# Patient Record
Sex: FEMALE | Race: WHITE | NOT HISPANIC OR LATINO | Employment: PART TIME | URBAN - METROPOLITAN AREA
[De-identification: names, ages, dates, MRNs, and addresses within clinical notes are randomized per-mention and may not be internally consistent; named-entity substitution may affect disease eponyms.]

---

## 2017-01-18 ENCOUNTER — ALLSCRIPTS OFFICE VISIT (OUTPATIENT)
Dept: OTHER | Facility: OTHER | Age: 37
End: 2017-01-18

## 2017-01-18 DIAGNOSIS — E55.9 VITAMIN D DEFICIENCY: ICD-10-CM

## 2017-01-18 DIAGNOSIS — R73.01 IMPAIRED FASTING GLUCOSE: ICD-10-CM

## 2017-01-18 DIAGNOSIS — E03.9 HYPOTHYROIDISM: ICD-10-CM

## 2017-01-18 DIAGNOSIS — M25.50 PAIN IN JOINT: ICD-10-CM

## 2017-01-18 DIAGNOSIS — E78.5 HYPERLIPIDEMIA: ICD-10-CM

## 2017-01-24 ENCOUNTER — LAB CONVERSION - ENCOUNTER (OUTPATIENT)
Dept: OTHER | Facility: OTHER | Age: 37
End: 2017-01-24

## 2017-01-24 LAB
25(OH)D3 SERPL-MCNC: 39 NG/ML (ref 30–100)
A/G RATIO (HISTORICAL): 1.3 (CALC) (ref 1–2.5)
ALBUMIN SERPL BCP-MCNC: 3.9 G/DL (ref 3.6–5.1)
ALP SERPL-CCNC: 94 U/L (ref 33–115)
ALT SERPL W P-5'-P-CCNC: 13 U/L (ref 6–29)
ANTI-NUCLEAR ANTIBODY (ANA) (HISTORICAL): NEGATIVE
AST SERPL W P-5'-P-CCNC: 12 U/L (ref 10–30)
BILIRUB SERPL-MCNC: 0.4 MG/DL (ref 0.2–1.2)
BUN SERPL-MCNC: 10 MG/DL (ref 7–25)
BUN/CREA RATIO (HISTORICAL): ABNORMAL (CALC) (ref 6–22)
C-REACTIVE PROTEIN (HISTORICAL): 2.15 MG/DL
CALCIUM SERPL-MCNC: 8.7 MG/DL (ref 8.6–10.2)
CHLORIDE SERPL-SCNC: 102 MMOL/L (ref 98–110)
CHOLEST SERPL-MCNC: 147 MG/DL (ref 125–200)
CHOLEST/HDLC SERPL: 5.4 (CALC)
CO2 SERPL-SCNC: 27 MMOL/L (ref 20–31)
CREAT SERPL-MCNC: 0.72 MG/DL (ref 0.5–1.1)
EGFR AFRICAN AMERICAN (HISTORICAL): 124 ML/MIN/1.73M2
EGFR-AMERICAN CALC (HISTORICAL): 107 ML/MIN/1.73M2
ERYTHROCYTE SEDIMENTATION RATE (HISTORICAL): 22 MM/H
GAMMA GLOBULIN (HISTORICAL): 2.9 G/DL (CALC) (ref 1.9–3.7)
GLUCOSE (HISTORICAL): 128 MG/DL (ref 65–99)
HBA1C MFR BLD HPLC: 6.6 % OF TOTAL HGB
HDLC SERPL-MCNC: 27 MG/DL
LDL CHOLESTEROL (HISTORICAL): 76 MG/DL (CALC)
NON-HDL-CHOL (CHOL-HDL) (HISTORICAL): 120 MG/DL (CALC)
POTASSIUM SERPL-SCNC: 4.4 MMOL/L (ref 3.5–5.3)
RHEUMATOID FACTOR (HISTORICAL): 111 IU/ML
SODIUM SERPL-SCNC: 139 MMOL/L (ref 135–146)
T4 FREE SERPL-MCNC: 1.2 NG/DL (ref 0.8–1.8)
TOTAL PROTEIN (HISTORICAL): 6.8 G/DL (ref 6.1–8.1)
TRIGL SERPL-MCNC: 221 MG/DL
TSH SERPL DL<=0.05 MIU/L-ACNC: 4.08 MIU/L

## 2017-02-02 ENCOUNTER — GENERIC CONVERSION - ENCOUNTER (OUTPATIENT)
Dept: OTHER | Facility: OTHER | Age: 37
End: 2017-02-02

## 2017-03-17 ENCOUNTER — GENERIC CONVERSION - ENCOUNTER (OUTPATIENT)
Dept: OTHER | Facility: OTHER | Age: 37
End: 2017-03-17

## 2017-07-10 ENCOUNTER — GENERIC CONVERSION - ENCOUNTER (OUTPATIENT)
Dept: OTHER | Facility: OTHER | Age: 37
End: 2017-07-10

## 2017-07-31 ENCOUNTER — GENERIC CONVERSION - ENCOUNTER (OUTPATIENT)
Dept: OTHER | Facility: OTHER | Age: 37
End: 2017-07-31

## 2017-08-02 DIAGNOSIS — E11.9 TYPE 2 DIABETES MELLITUS WITHOUT COMPLICATIONS (HCC): ICD-10-CM

## 2017-08-02 DIAGNOSIS — E78.5 HYPERLIPIDEMIA: ICD-10-CM

## 2017-09-06 ENCOUNTER — GENERIC CONVERSION - ENCOUNTER (OUTPATIENT)
Dept: OTHER | Facility: OTHER | Age: 37
End: 2017-09-06

## 2017-09-15 ENCOUNTER — GENERIC CONVERSION - ENCOUNTER (OUTPATIENT)
Dept: OTHER | Facility: OTHER | Age: 37
End: 2017-09-15

## 2017-09-15 LAB
LEFT EYE DIABETIC RETINOPATHY: NORMAL
RIGHT EYE DIABETIC RETINOPATHY: NORMAL

## 2017-09-19 ENCOUNTER — GENERIC CONVERSION - ENCOUNTER (OUTPATIENT)
Dept: OTHER | Facility: OTHER | Age: 37
End: 2017-09-19

## 2017-10-20 ENCOUNTER — GENERIC CONVERSION - ENCOUNTER (OUTPATIENT)
Dept: OTHER | Facility: OTHER | Age: 37
End: 2017-10-20

## 2018-01-01 DIAGNOSIS — E11.9 TYPE 2 DIABETES MELLITUS WITHOUT COMPLICATIONS (HCC): ICD-10-CM

## 2018-01-01 DIAGNOSIS — E03.9 HYPOTHYROIDISM: ICD-10-CM

## 2018-01-01 DIAGNOSIS — E66.9 OBESITY: ICD-10-CM

## 2018-01-01 DIAGNOSIS — E78.5 HYPERLIPIDEMIA: ICD-10-CM

## 2018-01-04 ENCOUNTER — LAB CONVERSION - ENCOUNTER (OUTPATIENT)
Dept: OTHER | Facility: OTHER | Age: 38
End: 2018-01-04

## 2018-01-04 LAB
A/G RATIO (HISTORICAL): 1.4 (CALC) (ref 1–2.5)
ALBUMIN SERPL BCP-MCNC: 3.9 G/DL (ref 3.6–5.1)
ALP SERPL-CCNC: 112 U/L (ref 33–115)
ALT SERPL W P-5'-P-CCNC: 19 U/L (ref 6–29)
AST SERPL W P-5'-P-CCNC: 15 U/L (ref 10–30)
BILIRUB SERPL-MCNC: 0.5 MG/DL (ref 0.2–1.2)
BUN SERPL-MCNC: 11 MG/DL (ref 7–25)
BUN/CREA RATIO (HISTORICAL): ABNORMAL (CALC) (ref 6–22)
CALCIUM SERPL-MCNC: 9.1 MG/DL (ref 8.6–10.2)
CHLORIDE SERPL-SCNC: 99 MMOL/L (ref 98–110)
CHOLEST SERPL-MCNC: 132 MG/DL
CHOLEST/HDLC SERPL: 6 (CALC)
CO2 SERPL-SCNC: 32 MMOL/L (ref 20–31)
CREAT SERPL-MCNC: 0.74 MG/DL (ref 0.5–1.1)
EGFR AFRICAN AMERICAN (HISTORICAL): 119 ML/MIN/1.73M2
EGFR-AMERICAN CALC (HISTORICAL): 103 ML/MIN/1.73M2
GAMMA GLOBULIN (HISTORICAL): 2.8 G/DL (CALC) (ref 1.9–3.7)
GLUCOSE (HISTORICAL): 158 MG/DL (ref 65–99)
HBA1C MFR BLD HPLC: 6.7 % OF TOTAL HGB
HDLC SERPL-MCNC: 22 MG/DL
LDL CHOLESTEROL (HISTORICAL): 77 MG/DL (CALC)
NON-HDL-CHOL (CHOL-HDL) (HISTORICAL): 110 MG/DL (CALC)
POTASSIUM SERPL-SCNC: 4.1 MMOL/L (ref 3.5–5.3)
SODIUM SERPL-SCNC: 139 MMOL/L (ref 135–146)
TOTAL PROTEIN (HISTORICAL): 6.7 G/DL (ref 6.1–8.1)
TRIGL SERPL-MCNC: 237 MG/DL
TSH SERPL DL<=0.05 MIU/L-ACNC: 3.14 MIU/L

## 2018-01-13 NOTE — MISCELLANEOUS
Message   Recorded as Task   Date: 08/17/2016 08:19 AM, Created By: Chance Lang   Task Name: Follow Up   Assigned To: [facility],Team   Regarding Patient: Neena Dupree, Status: Active   CommentEun Sherman - 17 Aug 2016 8:19 AM     TASK CREATED    Routine post EGD call placed to patient  Patient answered and stated she is doing well  Patient had no questions or concerns  Path pending  Chance Lang - 23 Aug 2016 12:55 PM     TASK EDITED  See results/task note  Active Problems    1  Abdominal pain, left upper quadrant (789 02) (R10 12)   2  Abnormal fasting glucose (790 29) (R73 01)   3  Allergic rhinitis (477 9) (J30 9)   4  Bile salt-induced diarrhea (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)   5  De Quervain's tenosynovitis (727 04) (M65 4)   6  Dizziness (780 4) (R42)   7  Gastritis (535 50) (K29 70)   8  Hepatosplenomegaly (571 8) (R16 2)   9  Herpes zoster (053 9) (B02 9)   10  Hyperlipidemia (272 4) (E78 5)   11  Hypothyroidism (244 9) (E03 9)   12  Mid back pain (724 5) (M54 9)   13  Migraine headache (346 90) (G43 909)   14  Obesity (278 00) (E66 9)   15  Obstructive sleep apnea (327 23) (G47 33)   16  Polycystic ovarian syndrome (256 4) (E28 2)   17  Prediabetes (790 29) (R73 09)   18  Sore throat (462) (J02 9)   19  Splenomegaly (789 2) (R16 1)   20  Upper respiratory infection (465 9) (J06 9)   21  Vitamin D deficiency (268 9) (E55 9)    Current Meds   1  Cholestyramine 4 GM/DOSE Oral Powder; MIX 1 SCOOP IN LIQUID AND DRINK ONCE   DAILY; Therapy: 32AEK8425 to ([facility])  Requested for: 41Iry0789; Last   Rx:77Yum1048 Ordered   2  Levothyroxine Sodium 100 MCG Oral Tablet; Take 1 tablet daily; Therapy: 30BTP5406 to (Last Rx:07Lvi9997)  Requested for: 22Unb8446 Ordered   3  MetFORMIN HCl  MG Oral Tablet Extended Release 24 Hour; Therapy: (Recorded:51Yzj9442) to Recorded   4  Minastrin 24 Fe 1-20 MG-MCG(24) Oral Tablet Chewable; Therapy: (Recorded:56Tng1184) to Recorded   5  Omeprazole 20 MG Oral Capsule Delayed Release; TAKE 1 CAPSULE DAILY; Therapy: 96PFQ7192 to (Evaluate:85Pkd0000)  Requested for: 45MTI9412; Last   Rx:36Wyb7949 Ordered   6  Prenatal Vitamins TABS; Take 1 tablet daily Recorded    Allergies    1  Albuterol Sulfate SYRP    2  Adhesive Tape   3   Seasonal    Signatures   Electronically signed by : Melodie Cottrell, ; Aug 23 2016 12:55PM EST                       (Author)

## 2018-01-14 NOTE — RESULT NOTES
Message   blood sugar is very high, rec HgA1C, and urine for microalbumin  Start low sugar diet and rec  f-up in office to discuss labs after [facility] and urine for microalbumin done  Verified Results  (1) COMPREHENSIVE METABOLIC PANEL 05XGS4241 55:23[facility]     Test Name Result Flag Reference   GLUCOSE 195 mg/dL H 65-99   Fasting reference interval   UREA NITROGEN (BUN) 9 mg/dL  7-25   CREATININE 0 83 mg/dL  0 50-1 10   eGFR NON-AFR   AMERICAN 90 mL/min/1 73m2  > OR = 60   eGFR AFRICAN AMERICAN 104 mL/min/1 73m2  > OR = 60   BUN/CREATININE RATIO   5-08   NOT APPLICABLE (calc)   SODIUM 139 mmol/L  135-146   POTASSIUM 4 1 mmol/L  3 5-5 3   CHLORIDE 99 mmol/L     CARBON DIOXIDE 29 mmol/L  19-30   CALCIUM 8 8 mg/dL  8 6-10 2   PROTEIN, TOTAL 7 0 g/dL  6 1-8 1   ALBUMIN 4 2 g/dL  3 6-5 1   GLOBULIN 2 8 g/dL (calc)  1 9-3 7   ALBUMIN/GLOBULIN RATIO 1 5 (calc)  1 0-2 5   BILIRUBIN, TOTAL 0 5 mg/dL  0 2-1 2   ALKALINE PHOSPHATASE 98 U/L     AST 14 U/L  10-30   ALT 16 U/L  6-29     (1) CBC/PLT/DIFF 12BEJ0553 10:48[facility]     Test Name Result Flag Reference   WHITE BLOOD CELL COUNT 12 4 Thousand/uL H 3 8-10 8   RED BLOOD CELL COUNT 4 61 Million/uL  3 80-5 10   HEMOGLOBIN 12 4 g/dL  11 7-15 5   HEMATOCRIT 39 3 %  35 0-45 0   MCV 85 1 fL  80 0-100 0   MCH 26 9 pg L 27 0-33 0   MCHC 31 6 g/dL L 32 0-36 0   RDW 16 3 % H 11 0-15 0   PLATELET COUNT 846 Thousand/uL  140-400   MPV 9 1 fL  7 5-11 5   ABSOLUTE NEUTROPHILS 9560 cells/uL H 6756-3025   ABSOLUTE LYMPHOCYTES 2294 cells/uL  850-3900   ABSOLUTE MONOCYTES 384 cells/uL  200-950   ABSOLUTE EOSINOPHILS 136 cells/uL     ABSOLUTE BASOPHILS 25 cells/uL  0-200   NEUTROPHILS 77 1 %     LYMPHOCYTES 18 5 %     MONOCYTES 3 1 %     EOSINOPHILS 1 1 %     BASOPHILS 0 2 %       (Q) AMYLASE 20WAO7416 10:48[facility]     Test Name Result Flag Reference   AMYLASE 20 U/L L      (1) LIPASE 23UQI8607 10:48AM Sirisha Knife   REPORT COMMENT:  FASTING:YES     Test Name Result Flag Reference   LIPASE 27 U/L  7-60

## 2018-01-15 NOTE — MISCELLANEOUS
Message   Recorded as Task   Date: 08/23/2016 08:32 AM, Created By: Padma Franks   Task Name: Go to Result   Assigned To: [facility],Team   Regarding Patient: Noreen Angela, Status: Active   CommentEmile Rhoades - 23 Aug 2016 8:32 AM     TASK CREATED  Fairly normal results, some gastritis  take meds prn   Hubert Eva - 23 Aug 2016 1:02 PM     TASK EDITED  Called patient with results and informed her everything was fairly normal it just showed some gastritis which could be managed with medication  Asked if she is taking the omeprazole for it and she said yes  Asked patient if it was helping and patient states not really  Told her she can follow-up with GI to manage her symptoms and offered to give her a GI specialists number for her to call  Patient said no that was okay  Asked patient if she had any other questions or concerns and patient states no  Active Problems    1  Abdominal pain, left upper quadrant (789 02) (R10 12)   2  Abnormal fasting glucose (790 29) (R73 01)   3  Allergic rhinitis (477 9) (J30 9)   4  Bile salt-induced diarrhea (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)   5  De Quervain's tenosynovitis (727 04) (M65 4)   6  Dizziness (780 4) (R42)   7  Gastritis (535 50) (K29 70)   8  Hepatosplenomegaly (571 8) (R16 2)   9  Herpes zoster (053 9) (B02 9)   10  Hyperlipidemia (272 4) (E78 5)   11  Hypothyroidism (244 9) (E03 9)   12  Mid back pain (724 5) (M54 9)   13  Migraine headache (346 90) (G43 909)   14  Obesity (278 00) (E66 9)   15  Obstructive sleep apnea (327 23) (G47 33)   16  Polycystic ovarian syndrome (256 4) (E28 2)   17  Prediabetes (790 29) (R73 09)   18  Sore throat (462) (J02 9)   19  Splenomegaly (789 2) (R16 1)   20  Upper respiratory infection (465 9) (J06 9)   21  Vitamin D deficiency (268 9) (E55 9)    Current Meds   1  Cholestyramine 4 GM/DOSE Oral Powder; MIX 1 SCOOP IN LIQUID AND DRINK ONCE   DAILY;    Therapy: 60YFU5123 to (Jeny Reese)  Requested for: 24UHE8240; Last   Rx:51Spn4841 Ordered   2  Levothyroxine Sodium 100 MCG Oral Tablet; Take 1 tablet daily; Therapy: 80TDJ7195 to (Last Rx:22Ipx2538)  Requested for: 98Jjk1956 Ordered   3  MetFORMIN HCl  MG Oral Tablet Extended Release 24 Hour; Therapy: (Recorded:47Kcp3402) to Recorded   4  Minastrin 24 Fe 1-20 MG-MCG(24) Oral Tablet Chewable; Therapy: (Recorded:38Vhq1283) to Recorded   5  Omeprazole 20 MG Oral Capsule Delayed Release; TAKE 1 CAPSULE DAILY; Therapy: 66CMK0920 to (Evaluate:47Yqe9492)  Requested for: 42FBC9544; Last   Rx:37Dcw9677 Ordered   6  Prenatal Vitamins TABS; Take 1 tablet daily Recorded    Allergies    1  Albuterol Sulfate SYRP    2  Adhesive Tape   3   Seasonal    Signatures   Electronically signed by : Frank Becerril, ; Aug 23 2016  1:02PM EST                       (Author)

## 2018-01-15 NOTE — RESULT NOTES
Message  Our records indicate that you are overdue for a diabetic eye exam  Please contact your eye doctor to schedule an appointment with their office  If you do not have an eye doctor, please contact our office and we will recommend a physician to you  We can be reached at [phone]   Thank you      Signatures   Electronically signed by : Angel Hunt, ; Jul 10 2017  1:31PM EST                       (Author)

## 2018-01-16 NOTE — MISCELLANEOUS
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)   5  De Quervain's tenosynovitis (727 04) (M65 4)   6  Dizziness (780 4) (R42)   7  Gastritis (535 50) (K29 70)   8  Hepatosplenomegaly (571 8) (R16 2)   9  Herpes zoster (053 9) (B02 9)   10  Hyperlipidemia (272 4) (E78 5)   11  Hypothyroidism (244 9) (E03 9)   12  Mid back pain (724 5) (M54 9)   13  Migraine headache (346 90) (G43 909)   14  Obesity (278 00) (E66 9)   15  Obstructive sleep apnea (327 23) (G47 33)   16  Polycystic ovarian syndrome (256 4) (E28 2)   17  Prediabetes (790 29) (R73 09)   18  Sore throat (462) (J02 9)   19  Splenomegaly (789 2) (R16 1)   20  Upper respiratory infection (465 9) (J06 9)   21  Vitamin D deficiency (268 9) (E55 9)    Current Meds   1  Cholestyramine 4 GM/DOSE Oral Powder; MIX 1 SCOOP IN LIQUID AND DRINK ONCE   DAILY; Therapy: 25JWC6437 to (Leonardo Herman)  Requested for: 72Evo3695; Last   Rx:51Kna6844 Ordered   2  Levothyroxine Sodium 100 MCG Oral Tablet; Take 1 tablet daily; Therapy: 03XNJ8271 to (Last Rx:95Ypp3456)  Requested for: 54Gvi0100 Ordered   3  MetFORMIN HCl  MG Oral Tablet Extended Release 24 Hour; Therapy: (Recorded:18Vcm9954) to Recorded   4  Minastrin 24 Fe 1-20 MG-MCG(24) Oral Tablet Chewable; Therapy: (Recorded:28Jsu5461) to Recorded   5  Omeprazole 20 MG Oral Capsule Delayed Release; TAKE 1 CAPSULE DAILY; Therapy: 64JRQ1406 to (Evaluate:21Krq6028)  Requested for: 50EQS7297; Last   Rx:54Loz7240 Ordered   6  Prenatal Vitamins TABS; Take 1 tablet daily Recorded    Allergies    1  Albuterol Sulfate SYRP    2  Adhesive Tape   3   Seasonal    Signatures   Electronically signed by : Beatriz Denton, ; Sep  1 2016  4:29PM EST (Author)

## 2018-01-16 NOTE — PROGRESS NOTES
Assessment    1  Encounter for preventive health examination (V70 0) (Z00 00)   2  Controlled type 2 diabetes mellitus without complication, without long-term current use of   insulin (250 00) (E11 9)   3  Hypothyroidism (244 9) (E03 9)   4  Arthralgia (719 40) (M25 50)    Plan  Abnormal fasting glucose, Arthralgia, Hyperlipidemia    · (1) LIPID PANEL, FASTING; Status:Active - Retrospective By Protocol Authorization; Requested VKX:06GQD3290 02:22PM;   Abnormal fasting glucose, Arthralgia, Hyperlipidemia, Hypothyroidism    · (1) HEMOGLOBIN A1C; Status:Active - Retrospective By Protocol Authorization; Requested YRQ:26AKA4758 02:22PM;   Arthralgia    · (1) RAKESH SCREEN W/REFLEX TO TITER/PATTERN; Status:Active - Retrospective By  Protocol Authorization; Requested YTV:99MBN0595 02:22PM;    · (1) C-REACTIVE PROTEIN; Status:Active - Retrospective By Protocol Authorization; Requested DOQ:21RGQ9863 02:22PM;    · (1) RHEUMATOID FACTOR SCREEN; Status:Active - Retrospective By Protocol  Authorization; Requested NME:62TXD0665 02:22PM;    · (1) SED RATE; Status:Active - Retrospective By Protocol Authorization; Requested  MONISHA:80YHN2109 02:22PM;   Arthralgia, Vitamin D deficiency    · (1) VITAMIN D 25-HYDROXY; Status:Active - Retrospective By Protocol Authorization; Requested SLS:61RPF0842 02:22PM;   Gastritis    · Omeprazole 20 MG Oral Capsule Delayed Release  Hyperlipidemia    · (1) COMPREHENSIVE METABOLIC PANEL; Status:Active - Retrospective By Protocol  Authorization; Requested ZSY:09YQC0380 02:22PM;   Hypothyroidism    · Levothyroxine Sodium 100 MCG Oral Tablet   · (1) T4, FREE; Status:Active - Retrospective By Protocol Authorization; Requested  XBM:50KHP4933 02:22PM;    · (1) TSH; Status:Active - Retrospective By Protocol Authorization; Requested  NZV:40YNT7081 02:22PM;     Chief Complaint  Pt here for yearly physical exam, no complaints  Active Problems    1   Abdominal pain, left upper quadrant (789 02) (R10 12) 2  Abnormal fasting glucose (790 29) (R73 01)   3  Acute sinusitis (461 9) (J01 90)   4  Acute upper respiratory infection (465 9) (J06 9)   5  Allergic rhinitis (477 9) (J30 9)   6  Bile salt-induced diarrhea (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)   7  De Quervain's tenosynovitis (727 04) (M65 4)   8  Dizziness (780 4) (R42)   9  Gastritis (535 50) (K29 70)   10  Hepatosplenomegaly (571 8) (R16 2)   11  Herpes zoster (053 9) (B02 9)   12  Hyperlipidemia (272 4) (E78 5)   13  Hypothyroidism (244 9) (E03 9)   14  Mid back pain (724 5) (M54 9)   15  Migraine headache (346 90) (G43 909)   16  Obesity (278 00) (E66 9)   17  Obstructive sleep apnea (327 23) (G47 33)   18  Polycystic ovarian syndrome (256 4) (E28 2)   19  Prediabetes (790 29) (R73 09)   20  Splenomegaly (789 2) (R16 1)   21   Vitamin D deficiency (268 9) (E55 9)    Past Medical History    · History of Abnormal blood chemistry (790 6) (R79 9)   · History of Antepartum diabetes mellitus (648 03,250 00) (O24 919)   · History of Asthma (493 90) (J45 909)   · History of Blood pressure elevated (401 9) (I10)   · History of Cough (786 2) (R05)   · History of Diabetes During Pregnancy   · History of Ear pain, left (388 70) (H92 02)   · History of Generalized anxiety disorder (300 02) (F41 1)   · History of Gestational Diabetes Mellitus   · History of Gestational diabetes mellitus, antepartum (314 53) (O24 419)   · History of Gestational diabetes mellitus, currently pregnant (648 83) (O24 419)   · History of diabetes mellitus (V12 29) (Z86 39)   · History of dysfunctional uterine bleeding (V13 29) (Z87 42)   · History of edema (V13 89) (U86 662)   · History of hyperlipidemia (V12 29) (Z86 39)   · History of hypothyroidism (V12 29) (Z86 39)   · History of polycystic ovarian syndrome (V13 29) (Z87 42)   · History of pregnancy (V13 29)   · History of upper respiratory infection (V12 09) (Z87 09)   · History of Nonspecific abnormal finding (796 9) (R67 89)   · History of Postsurgical dumping syndrome (564 2) (K91 1)   · History of Sore throat (462) (J02 9)   · History of Suspected fetal anomaly not found (V89 03) (Z03 73)    Surgical History    · History of  Section   · History of Gallbladder Surgery   · History of Oral Surgery Tooth Extraction   · History of Tonsillectomy    Family History  Mother    · Family history of Heart Disease (V17 49)   · Family history of Hypertension (V17 49)   · Family history of Valvular Heart Disease  Maternal Grandmother    · Family history of Breast Cancer (V16 3)   · Family history of Diabetes Mellitus (V18 0)   · Family history of Hypertension (V17 49)  Maternal Grandfather    · Family history of Diabetes Mellitus (V18 0)  Family History    · Family history of Arthritis (V17 7)   · Family history of Varicose Veins Of Lower Extremities    Social History    · Being A Social Drinker   · Daily Coffee Consumption (1  Cups/Day)   · Denied: History of Drug Use   · Exercising Regularly   · Never A Smoker   · Sexually Active    Current Meds   1  Cholestyramine 4 GM/DOSE Oral Powder; MIX 1 SCOOP IN LIQUID AND DRINK ONCE   DAILY; Therapy: 83HAT3813 to (Alma Hollis)  Requested for: 12Pwr3124; Last   Rx:96Cbx5201 Ordered   2  Levothyroxine Sodium 100 MCG Oral Tablet; Take 1 tablet daily; Therapy: 88RKE2482 to (Last Rx:68Tie3185)  Requested for: 33Pge4032 Ordered   3  MetFORMIN HCl  MG Oral Tablet Extended Release 24 Hour; Therapy: (Recorded:67Jzq7257) to Recorded   4  Minastrin 24 Fe 1-20 MG-MCG(24) Oral Tablet Chewable; Therapy: (Recorded:11Gjc2897) to Recorded   5  Omeprazole 20 MG Oral Capsule Delayed Release; TAKE 1 CAPSULE DAILY; Therapy: 15ZQD4952 to (Evaluate:17Qzf3751)  Requested for: 94ROK6030; Last   Rx:99Meq0469 Ordered    Allergies    1  Albuterol Sulfate SYRP    2  Adhesive Tape   3   Seasonal    Vitals   Recorded: 58ECG9384 01:55PM Recorded: 86KAW2076 41:71NS   Systolic 526 112   Diastolic 80 78   Height  5 ft    Weight  247 lb 2 08 oz BMI Calculated  48 26   BSA Calculated  2 03     Signatures   Electronically signed by : Sarah Rangel DO; Jan 19 6806  4:15PM EST                       (Author)

## 2018-01-22 VITALS
SYSTOLIC BLOOD PRESSURE: 132 MMHG | DIASTOLIC BLOOD PRESSURE: 80 MMHG | WEIGHT: 247.13 LBS | HEIGHT: 60 IN | BODY MASS INDEX: 48.52 KG/M2

## 2018-01-25 PROBLEM — M25.50 ARTHRALGIA: Status: ACTIVE | Noted: 2017-01-18

## 2018-01-25 PROBLEM — E11.9 CONTROLLED TYPE 2 DIABETES MELLITUS WITHOUT COMPLICATION, WITHOUT LONG-TERM CURRENT USE OF INSULIN (HCC): Status: ACTIVE | Noted: 2017-01-19

## 2018-01-26 ENCOUNTER — OFFICE VISIT (OUTPATIENT)
Dept: FAMILY MEDICINE CLINIC | Facility: CLINIC | Age: 38
End: 2018-01-26
Payer: COMMERCIAL

## 2018-01-26 VITALS
SYSTOLIC BLOOD PRESSURE: 136 MMHG | BODY MASS INDEX: 46.14 KG/M2 | DIASTOLIC BLOOD PRESSURE: 80 MMHG | WEIGHT: 244.4 LBS | HEIGHT: 61 IN

## 2018-01-26 DIAGNOSIS — E11.9 CONTROLLED TYPE 2 DIABETES MELLITUS WITHOUT COMPLICATION, WITHOUT LONG-TERM CURRENT USE OF INSULIN (HCC): Primary | ICD-10-CM

## 2018-01-26 DIAGNOSIS — E03.9 ACQUIRED HYPOTHYROIDISM: ICD-10-CM

## 2018-01-26 PROCEDURE — 99214 OFFICE O/P EST MOD 30 MIN: CPT | Performed by: FAMILY MEDICINE

## 2018-01-26 RX ORDER — METFORMIN HYDROCHLORIDE 500 MG/1
TABLET, EXTENDED RELEASE ORAL
COMMUNITY
End: 2018-02-20 | Stop reason: SDUPTHER

## 2018-01-26 RX ORDER — NORETHINDRONE ACETATE AND ETHINYL ESTRADIOL AND FERROUS FUMARATE 1MG-20(24)
KIT ORAL
COMMUNITY
End: 2018-06-13 | Stop reason: ALTCHOICE

## 2018-01-26 RX ORDER — CHOLESTYRAMINE 4 G/9G
POWDER, FOR SUSPENSION ORAL DAILY
COMMUNITY
Start: 2016-07-25 | End: 2018-06-13 | Stop reason: ALTCHOICE

## 2018-01-26 RX ORDER — NORETHINDRONE ACETATE AND ETHINYL ESTRADIOL 1MG-20(21)
KIT ORAL
COMMUNITY
Start: 2017-11-12 | End: 2020-08-13

## 2018-01-26 NOTE — PROGRESS NOTES
Assessment/Plan:    Controlled type 2 diabetes mellitus without complication, without long-term current use of insulin (HCC)  A1c is less than 7  Recheck in 6 months  Continue risk factor modification  Hypothyroidism  TSH is at normal levels today  Improved since last visit  Recheck in 6 months  Obesity  Continue risk factor modification    Hyperlipidemia  LD L is at goal under 100  Diagnoses and all orders for this visit:    Controlled type 2 diabetes mellitus without complication, without long-term current use of insulin (HCC)  -     Comprehensive metabolic panel; Future  -     Hemoglobin A1c; Future  -     TSH, 3rd generation; Future  -     Lipid Panel with Direct LDL reflex; Future  -     Comprehensive metabolic panel  -     Hemoglobin A1c  -     TSH, 3rd generation  -     Lipid Panel with Direct LDL reflex    Acquired hypothyroidism  -     T4, free; Future  -     T4, free    Other orders  -     cholestyramine (QUESTRAN) 4 GM/DOSE powder; Take by mouth daily  -     metFORMIN (GLUCOPHAGE-XR) 500 mg 24 hr tablet; Take by mouth  -     Norethin Ace-Eth Estrad-FE (MINASTRIN 24 FE) 1-20 MG-MCG(24) CHEW; Chew  -     BLISOVI FE 1/20 1-20 MG-MCG per tablet;           Subjective:        Patient ID: Robynn Hashimoto is a 44 y o  female  Patient here for 6 month check on thyroid and diabetic issues  Overall doing well  No new patient here for 6 month check regarding diabetes and hypothyroidism  States she is doing well  Had labs prior to today's visit  Diabetes   She presents for her follow-up diabetic visit  She has type 2 diabetes mellitus  Pertinent negatives for hypoglycemia include no dizziness  Associated symptoms include polyuria  Pertinent negatives for diabetes include no chest pain  Risk factors for coronary artery disease include diabetes mellitus  Current diabetic treatment includes oral agent (monotherapy)   She is compliant with treatment most of the time (Has not had it since November )  Home blood sugar record trend: does not check  (Does not check blood sugar) She does not see a podiatrist Eye exam is current (9/15/17)  Review of Systems   Constitutional: Negative for activity change and appetite change  HENT: Negative for congestion  Respiratory: Negative for chest tightness and shortness of breath  Cardiovascular: Negative for chest pain and palpitations  Gastrointestinal: Negative for abdominal pain, diarrhea and nausea  Endocrine: Positive for polyuria  Genitourinary: Negative for difficulty urinating  Musculoskeletal: Positive for arthralgias (occasional right hip pain, resolves on its own)  Negative for gait problem  Skin: Negative for rash  Neurological: Negative for dizziness  Hematological: Negative for adenopathy  Objective:  /80   Ht 5' 0 5" (1 537 m)   Wt 111 kg (244 lb 6 4 oz)   BMI 46 95 kg/m²        Physical Exam   Constitutional: She appears well-nourished  HENT:   Head: Normocephalic and atraumatic  Right Ear: External ear normal    Left Ear: External ear normal    Mouth/Throat: Oropharynx is clear and moist    Eyes: EOM are normal  Pupils are equal, round, and reactive to light  No scleral icterus  Neck: Normal range of motion  No thyromegaly present  Cardiovascular: Normal rate, regular rhythm and intact distal pulses  No murmur heard  Pulmonary/Chest: Effort normal and breath sounds normal    Abdominal: Soft  Bowel sounds are normal    Musculoskeletal: She exhibits no edema  Lymphadenopathy:     She has no cervical adenopathy  Neurological: She displays normal reflexes  No cranial nerve deficit  Coordination normal    Skin: Skin is warm  Psychiatric: She has a normal mood and affect   Her behavior is normal  Thought content normal

## 2018-02-20 DIAGNOSIS — E11.9 TYPE 2 DIABETES MELLITUS WITHOUT COMPLICATION, WITHOUT LONG-TERM CURRENT USE OF INSULIN (HCC): Primary | ICD-10-CM

## 2018-02-20 DIAGNOSIS — E03.9 ACQUIRED HYPOTHYROIDISM: ICD-10-CM

## 2018-02-20 RX ORDER — LEVOTHYROXINE SODIUM 137 UG/1
137 TABLET ORAL DAILY
Qty: 90 TABLET | Refills: 0 | Status: SHIPPED | OUTPATIENT
Start: 2018-02-20 | End: 2018-08-03 | Stop reason: SDUPTHER

## 2018-02-20 RX ORDER — METFORMIN HYDROCHLORIDE 500 MG/1
500 TABLET, EXTENDED RELEASE ORAL 2 TIMES DAILY
Qty: 180 TABLET | Refills: 1 | Status: SHIPPED | OUTPATIENT
Start: 2018-02-20 | End: 2019-03-28 | Stop reason: SDUPTHER

## 2018-02-20 NOTE — TELEPHONE ENCOUNTER
[facility] - metformin has not been ordered by Deshaun Agudelo previously; need to confirm dose/sig/quantity; levothyroxine is listed as 2 different doses and need to confirm correct dose

## 2018-02-20 NOTE — TELEPHONE ENCOUNTER
Pt called in stating that she had requested refills of her Metformin   [facility]othyroxine [facility]  And they said nothing has been ordered  # [phone]

## 2018-06-04 ENCOUNTER — OFFICE VISIT (OUTPATIENT)
Dept: FAMILY MEDICINE CLINIC | Facility: CLINIC | Age: 38
End: 2018-06-04
Payer: COMMERCIAL

## 2018-06-04 VITALS
BODY MASS INDEX: 48.06 KG/M2 | TEMPERATURE: 97.6 F | OXYGEN SATURATION: 99 % | DIASTOLIC BLOOD PRESSURE: 98 MMHG | SYSTOLIC BLOOD PRESSURE: 154 MMHG | HEIGHT: 60 IN | WEIGHT: 244.8 LBS

## 2018-06-04 DIAGNOSIS — R06.2 WHEEZING: ICD-10-CM

## 2018-06-04 DIAGNOSIS — R05.9 COUGH: ICD-10-CM

## 2018-06-04 DIAGNOSIS — J06.9 UPPER RESPIRATORY TRACT INFECTION, UNSPECIFIED TYPE: Primary | ICD-10-CM

## 2018-06-04 DIAGNOSIS — R03.0 ELEVATED BP WITHOUT DIAGNOSIS OF HYPERTENSION: ICD-10-CM

## 2018-06-04 PROBLEM — I48.92 ATRIAL FLUTTER WITH RAPID VENTRICULAR RESPONSE (HCC): Status: ACTIVE | Noted: 2018-04-14

## 2018-06-04 PROBLEM — D72.823 LEUKEMOID REACTION: Status: ACTIVE | Noted: 2018-04-14

## 2018-06-04 PROCEDURE — 94760 N-INVAS EAR/PLS OXIMETRY 1: CPT | Performed by: NURSE PRACTITIONER

## 2018-06-04 PROCEDURE — 99213 OFFICE O/P EST LOW 20 MIN: CPT | Performed by: NURSE PRACTITIONER

## 2018-06-04 RX ORDER — PREDNISONE 10 MG/1
TABLET ORAL
Qty: 21 TABLET | Refills: 0 | Status: SHIPPED | OUTPATIENT
Start: 2018-06-04 | End: 2018-06-13 | Stop reason: ALTCHOICE

## 2018-06-04 RX ORDER — AZITHROMYCIN 250 MG/1
TABLET, FILM COATED ORAL
Qty: 6 TABLET | Refills: 0 | Status: SHIPPED | OUTPATIENT
Start: 2018-06-04 | End: 2018-06-08

## 2018-06-04 RX ORDER — METOPROLOL TARTRATE 50 MG/1
25 TABLET, FILM COATED ORAL
COMMUNITY
Start: 2018-05-16 | End: 2019-01-08

## 2018-06-04 RX ORDER — PROMETHAZINE HYDROCHLORIDE AND CODEINE PHOSPHATE 6.25; 1 MG/5ML; MG/5ML
5 SYRUP ORAL
Qty: 40 ML | Refills: 0 | Status: SHIPPED | OUTPATIENT
Start: 2018-06-04 | End: 2018-06-08

## 2018-06-04 NOTE — PROGRESS NOTES
Assessment/Plan:    URI/Wheezing/Cough:   patient will be started on azithromycin antibiotic and tapered dose of prednisone  She will also be given promethazine with codeine cough syrup for at night since she is not sleeping and cough is keeping her up  Patient is to call if no improvement or any worsening of symptoms  Elevated BP:    patient never had formal diagnosis of hypertension but is on Lopressor for previous a flutter with rapid ventricular response  Patient's pressure is elevated today for her  Patient states she does have blood pressure machine and cough at home and she will monitor blood pressure at home  She is to call us with numbers and if blood pressure is consistently greater than 140/90 she is to let us know right away  Patient understands that medication changes may need to be made    Patient verbalizes understand and agrees with treatment plan  Diagnoses and all orders for this visit:    Upper respiratory tract infection, unspecified type  -     azithromycin (ZITHROMAX) 250 mg tablet; Take 2 tablets today then 1 tablet daily x 4 days    Wheezing  -     predniSONE 10 mg tablet; Day 1: 6 tabs  Day 2: 5 tabs Day 3: 4 tabs  Day 4: 3 tabs  Day 5: 2 tabs  Day 6: 1 tab    Cough  -     predniSONE 10 mg tablet; Day 1: 6 tabs  Day 2: 5 tabs Day 3: 4 tabs  Day 4: 3 tabs  Day 5: 2 tabs  Day 6: 1 tab  -     promethazine-codeine (PHENERGAN WITH CODEINE) 6 25-10 mg/5 mL syrup; Take 5 mL by mouth daily at bedtime as needed for cough (at night) for up to 4 days    Elevated BP without diagnosis of hypertension    Other orders  -     metoprolol tartrate (LOPRESSOR) 50 mg tablet; Take 25 mg by mouth  -     ASPIRIN 81 PO; Take by mouth        Subjective:      Patient ID: Suzanne Prince is a 43 y o  female  Chief Complaint   Patient presents with    Cough    Wheezing    Shortness of Breath       Patient presents to the office today for coughing and wheezing  She has had symptoms for 9 days    Her daughter and son had strep a few weeks ago in she had about sore throat did the care now [facility] and the start her on 7 days of amoxicillin  On the 2nd day of amoxicillin is when her symptoms was started in bed with the cough  Her sore throat has resolved  She finished antibiotics on Saturday  Her symptoms have been worsening  Cough   This is a new problem  The problem has been gradually worsening  The problem occurs constantly  The cough is productive of sputum  Associated symptoms include nasal congestion, rhinorrhea, shortness of breath and wheezing  Pertinent negatives include no chest pain, chills, ear pain, fever, headaches, myalgias, postnasal drip, rash or sore throat  There is no history of asthma  The following portions of the patient's history were reviewed and updated as appropriate: allergies, current medications, past family history, past social history and problem list     Review of Systems   Constitutional: Negative for chills and fever  HENT: Positive for congestion and rhinorrhea  Negative for ear discharge, ear pain, facial swelling, postnasal drip, sinus pain, sinus pressure, sneezing and sore throat  Eyes: Negative for visual disturbance  Respiratory: Positive for cough, shortness of breath and wheezing  Negative for chest tightness  Cardiovascular: Negative for chest pain  Gastrointestinal: Negative for abdominal pain, diarrhea, nausea and vomiting  Genitourinary: Negative for difficulty urinating and dysuria  Musculoskeletal: Negative for myalgias  Skin: Negative for rash  Neurological: Negative for headaches  Psychiatric/Behavioral: Negative for agitation  Objective:  /98 (BP Location: Left arm, Patient Position: Sitting, Cuff Size: Standard)   Temp 97 6 °F (36 4 °C) (Tympanic)   Ht 5' (1 524 m)   Wt 111 kg (244 lb 12 8 oz)   SpO2 99%   BMI 47 81 kg/m²      Physical Exam   Constitutional: She is oriented to person, place, and time   She appears well-developed  No distress  Morbidly obese   HENT:   Head: Normocephalic and atraumatic  Right Ear: External ear normal    Left Ear: External ear normal    Nose: Nose normal    Eyes: Conjunctivae and lids are normal  Right eye exhibits no discharge  Left eye exhibits no discharge  Neck: Normal range of motion  Neck supple  No tracheal deviation present  Cardiovascular: Normal rate and regular rhythm  No murmur heard  Pulmonary/Chest: Effort normal  No respiratory distress  She has wheezes  coarse   Abdominal: Soft  Bowel sounds are normal  She exhibits no distension  There is no tenderness  There is no guarding  Musculoskeletal: Normal range of motion  She exhibits no edema, tenderness or deformity  Lymphadenopathy:     She has no cervical adenopathy  Neurological: She is alert and oriented to person, place, and time  Skin: Skin is warm and dry  No rash noted  She is not diaphoretic  No erythema  Psychiatric: She has a normal mood and affect  Her speech is normal and behavior is normal  Judgment and thought content normal  Cognition and memory are normal    Nursing note and vitals reviewed

## 2018-06-13 ENCOUNTER — OFFICE VISIT (OUTPATIENT)
Dept: FAMILY MEDICINE CLINIC | Facility: CLINIC | Age: 38
End: 2018-06-13
Payer: COMMERCIAL

## 2018-06-13 VITALS
WEIGHT: 243.6 LBS | HEIGHT: 60 IN | DIASTOLIC BLOOD PRESSURE: 82 MMHG | TEMPERATURE: 98.5 F | BODY MASS INDEX: 47.83 KG/M2 | OXYGEN SATURATION: 97 % | SYSTOLIC BLOOD PRESSURE: 118 MMHG

## 2018-06-13 DIAGNOSIS — E11.9 TYPE 2 DIABETES MELLITUS WITHOUT COMPLICATION, UNSPECIFIED WHETHER LONG TERM INSULIN USE (HCC): Primary | ICD-10-CM

## 2018-06-13 DIAGNOSIS — R05.9 COUGH: ICD-10-CM

## 2018-06-13 DIAGNOSIS — R03.0 ELEVATED BP WITHOUT DIAGNOSIS OF HYPERTENSION: Primary | ICD-10-CM

## 2018-06-13 DIAGNOSIS — E11.9 CONTROLLED TYPE 2 DIABETES MELLITUS WITHOUT COMPLICATION, WITHOUT LONG-TERM CURRENT USE OF INSULIN (HCC): ICD-10-CM

## 2018-06-13 DIAGNOSIS — J06.9 UPPER RESPIRATORY TRACT INFECTION, UNSPECIFIED TYPE: ICD-10-CM

## 2018-06-13 PROCEDURE — 99214 OFFICE O/P EST MOD 30 MIN: CPT | Performed by: FAMILY MEDICINE

## 2018-06-13 PROCEDURE — 3008F BODY MASS INDEX DOCD: CPT | Performed by: FAMILY MEDICINE

## 2018-06-13 RX ORDER — LANCETS
EACH MISCELLANEOUS
Qty: 100 EACH | Refills: 0 | Status: SHIPPED | OUTPATIENT
Start: 2018-06-13

## 2018-06-13 RX ORDER — BENZONATATE 100 MG/1
200 CAPSULE ORAL 3 TIMES DAILY PRN
Qty: 30 CAPSULE | Refills: 0 | Status: SHIPPED | OUTPATIENT
Start: 2018-06-13 | End: 2018-07-23 | Stop reason: ALTCHOICE

## 2018-06-13 RX ORDER — FLUTICASONE PROPIONATE 50 MCG
2 SPRAY, SUSPENSION (ML) NASAL DAILY
Qty: 16 G | Refills: 1 | Status: SHIPPED | OUTPATIENT
Start: 2018-06-13 | End: 2021-03-09

## 2018-06-13 NOTE — TELEPHONE ENCOUNTER
Patient called and stated she gave you the wrong name to her test strips  Its accu check ankita  States she rarely tests her sugars but when she is not feeling well then she will test it   States we can put once a day on the script then

## 2018-06-13 NOTE — PROGRESS NOTES
Assessment/Plan:  Chief Complaint   Patient presents with    Cough     terrible cough and wheezing has been ongoing for awhile now about 4 weeks - can't sleep from it - does have a little bit of SOB     Patient Instructions   Start tessalon prn cough as directed and use Flonase prn pnd and cough as the cough may be due to PND  Refilled glucose test strips and lancets and also stay well hydrated and call if worse  Does get BS checked every 6 months  She will monitor it while sick  Her previous elevated Bp is now stable  Monitor BP as directed  No problem-specific Assessment & Plan notes found for this encounter  Diagnoses and all orders for this visit:    Elevated BP without diagnosis of hypertension    Upper respiratory tract infection, unspecified type  -     fluticasone (FLONASE) 50 mcg/act nasal spray; 2 sprays into each nostril daily    Cough  -     benzonatate (TESSALON PERLES) 100 mg capsule; Take 2 capsules (200 mg total) by mouth 3 (three) times a day as needed for cough    Controlled type 2 diabetes mellitus without complication, without long-term current use of insulin ([facility])  -     glucose blood (ONE TOUCH ULTRA TEST) test strip; Use as instructed  -     Lancets (ONETOUCH ULTRASOFT) lancets; Use as instructed          Subjective:      Patient ID: Gail Rashid is a 43 y o  female  Cough (terrible cough and wheezing has been ongoing for awhile now about 4 weeks - can't sleep from it - does have a little bit of SOB)        The following portions of the patient's history were reviewed and updated as appropriate: allergies, current medications, past family history, past medical history, past social history, past surgical history and problem list     Review of Systems   Constitutional: Negative  HENT: Negative  Eyes: Negative  Respiratory: Positive for wheezing  Cardiovascular: Negative  Gastrointestinal: Negative  Endocrine: Negative  Genitourinary: Negative  Musculoskeletal: Negative  Skin: Negative  Allergic/Immunologic: Negative  Neurological: Negative  Hematological: Negative  Psychiatric/Behavioral: Negative  Objective:      /82 (BP Location: Left arm, Patient Position: Sitting, Cuff Size: Large)   Temp 98 5 °F (36 9 °C) (Tympanic)   Ht 5' (1 524 m)   Wt 110 kg (243 lb 9 6 oz)   SpO2 97%   BMI 47 57 kg/m²          Physical Exam   Constitutional: She is oriented to person, place, and time  She appears well-developed and well-nourished  HENT:   Head: Normocephalic and atraumatic  Right Ear: External ear normal    Left Ear: External ear normal    Nose: Nose normal    Mouth/Throat: Oropharynx is clear and moist    Eyes: Conjunctivae and EOM are normal  Pupils are equal, round, and reactive to light  Neck: Normal range of motion  Neck supple  Cardiovascular: Normal rate, regular rhythm, normal heart sounds and intact distal pulses  Pulmonary/Chest: Effort normal and breath sounds normal    Cough     Musculoskeletal: Normal range of motion  Neurological: She is alert and oriented to person, place, and time  She has normal reflexes  Skin: Skin is warm and dry  Psychiatric: She has a normal mood and affect   Her behavior is normal

## 2018-06-20 DIAGNOSIS — E11.8 TYPE 2 DIABETES MELLITUS WITH COMPLICATION, UNSPECIFIED WHETHER LONG TERM INSULIN USE: Primary | ICD-10-CM

## 2018-07-17 DIAGNOSIS — E11.9 CONTROLLED TYPE 2 DIABETES MELLITUS WITHOUT COMPLICATION, WITHOUT LONG-TERM CURRENT USE OF INSULIN (HCC): ICD-10-CM

## 2018-07-23 ENCOUNTER — OFFICE VISIT (OUTPATIENT)
Dept: FAMILY MEDICINE CLINIC | Facility: CLINIC | Age: 38
End: 2018-07-23
Payer: COMMERCIAL

## 2018-07-23 VITALS
WEIGHT: 243.8 LBS | DIASTOLIC BLOOD PRESSURE: 78 MMHG | BODY MASS INDEX: 44.87 KG/M2 | HEIGHT: 62 IN | SYSTOLIC BLOOD PRESSURE: 128 MMHG

## 2018-07-23 DIAGNOSIS — E03.9 ACQUIRED HYPOTHYROIDISM: ICD-10-CM

## 2018-07-23 DIAGNOSIS — E66.01 MORBID OBESITY (HCC): ICD-10-CM

## 2018-07-23 DIAGNOSIS — E06.3 HYPOTHYROIDISM DUE TO HASHIMOTO'S THYROIDITIS: ICD-10-CM

## 2018-07-23 DIAGNOSIS — I51.7 LEFT VENTRICULAR HYPERTROPHY: ICD-10-CM

## 2018-07-23 DIAGNOSIS — E11.9 CONTROLLED TYPE 2 DIABETES MELLITUS WITHOUT COMPLICATION, WITHOUT LONG-TERM CURRENT USE OF INSULIN (HCC): Primary | ICD-10-CM

## 2018-07-23 DIAGNOSIS — G47.33 OBSTRUCTIVE SLEEP APNEA: ICD-10-CM

## 2018-07-23 DIAGNOSIS — E78.2 MIXED HYPERLIPIDEMIA: ICD-10-CM

## 2018-07-23 DIAGNOSIS — E03.8 HYPOTHYROIDISM DUE TO HASHIMOTO'S THYROIDITIS: ICD-10-CM

## 2018-07-23 PROCEDURE — 99214 OFFICE O/P EST MOD 30 MIN: CPT | Performed by: FAMILY MEDICINE

## 2018-07-23 RX ORDER — BIOTIN 10 MG
TABLET ORAL
COMMUNITY

## 2018-07-23 RX ORDER — BLOOD-GLUCOSE METER
KIT MISCELLANEOUS
Refills: 0 | COMMUNITY
Start: 2018-06-20

## 2018-07-23 RX ORDER — APIXABAN 5 MG/1
5 TABLET, FILM COATED ORAL 2 TIMES DAILY
Refills: 2 | COMMUNITY
Start: 2018-07-13 | End: 2019-01-08

## 2018-07-23 NOTE — PROGRESS NOTES
Assessment/Plan:    Patient did not get her blood work done since being in the hospital 2 x 1 in April and 1 in July  She will go this week to obtain her labs  That will reassess her diabetic status and thyroid issue  Patient had sleep evaluation  She is waiting for test results  Patient continues to be morbidly obese and needs to start working aggressively on risk factor modification  Echocardiogram hospitalization reviewed  Otherwise recheck in 6 months with labs are all normal   Patient has gyn evaluation within the next 30 days  Diagnoses and all orders for this visit:    Controlled type 2 diabetes mellitus without complication, without long-term current use of insulin ([address] )    Acquired hypothyroidism    Hypothyroidism due to Hashimoto's thyroiditis    Obstructive sleep apnea    Mixed hyperlipidemia    Morbid obesity ([address] )    Left ventricular hypertrophy    Other orders  -     ELIQUIS 5 MG; Take 5 mg by mouth 2 (two) times a day  -     Blood Glucose Monitoring Suppl (ONE TOUCH ULTRA MINI) w/Device KIT; USE AS DIRECTED DX: E11 8  -     Multiple Vitamins-Minerals (MULTIVITAMIN ADULT) CHEW; Chew        There are no Patient Instructions on file for this visit  Subjective:        Patient ID: Sonia Anderson is a 43 y o  female  Chief Complaint   Patient presents with    Follow-up     DM / atrial flutter       Patient here for 6 month check of labs  Unfortunately she did not get a good she has been on hospital once in April once in July for atrial flutter  She did undergo cardioversion  She just had sleep study done which apparently is positive  She is waiting for treatment regarding that  Currently feels well  The following portions of the patient's history were reviewed and updated as appropriate: past medical history, past surgical history and problem list       Review of Systems   Constitutional: Negative for appetite change, fatigue, fever and unexpected weight change     HENT: Negative for congestion, ear pain, postnasal drip, rhinorrhea, sinus pain, sinus pressure and sore throat  Eyes: Negative for redness and visual disturbance  Respiratory: Negative for chest tightness and shortness of breath  Cardiovascular: Negative for chest pain, palpitations and leg swelling  Gastrointestinal: Negative for abdominal distention, abdominal pain, diarrhea and nausea  Endocrine: Negative for cold intolerance and heat intolerance  Genitourinary: Negative for dysuria and hematuria  Musculoskeletal: Negative for arthralgias, gait problem and myalgias  Skin: Negative for pallor and rash  Neurological: Negative for dizziness and headaches  Psychiatric/Behavioral: Negative for behavioral problems  The patient is not nervous/anxious  Objective:  /78 (BP Location: Left arm, Patient Position: Sitting, Cuff Size: Standard)   Ht 5' 1 5" (1 562 m)   Wt 111 kg (243 lb 12 8 oz)   BMI 45 32 kg/m²        Physical Exam   Constitutional: She is oriented to person, place, and time  She appears well-nourished  No distress  Morbidly obese   HENT:   Head: Normocephalic and atraumatic  Mouth/Throat: Oropharynx is clear and moist    Eyes: Conjunctivae and EOM are normal  Pupils are equal, round, and reactive to light  No scleral icterus  Neck: Normal range of motion  Neck supple  No thyromegaly present  Cardiovascular: Normal rate, regular rhythm and intact distal pulses  No murmur heard  Pulmonary/Chest: Effort normal and breath sounds normal  She has no wheezes  Abdominal: Soft  She exhibits no distension  There is no tenderness  Musculoskeletal: Normal range of motion  She exhibits no edema  Lymphadenopathy:     She has no cervical adenopathy  Neurological: She is alert and oriented to person, place, and time  She displays normal reflexes  Coordination normal    Skin: Skin is warm  No pallor  Psychiatric: She has a normal mood and affect   Her behavior is normal  Thought content normal    Nursing note and vitals reviewed

## 2018-08-03 DIAGNOSIS — E03.9 ACQUIRED HYPOTHYROIDISM: ICD-10-CM

## 2018-08-06 RX ORDER — LEVOTHYROXINE SODIUM 137 UG/1
TABLET ORAL
Qty: 90 TABLET | Refills: 3 | Status: SHIPPED | OUTPATIENT
Start: 2018-08-06 | End: 2019-07-19

## 2018-08-08 LAB
ALBUMIN SERPL-MCNC: 3.8 G/DL (ref 3.6–5.1)
ALBUMIN/GLOB SERPL: 1.4 (CALC) (ref 1–2.5)
ALP SERPL-CCNC: 84 U/L (ref 33–115)
ALT SERPL-CCNC: 12 U/L (ref 6–29)
AST SERPL-CCNC: 14 U/L (ref 10–30)
BILIRUB SERPL-MCNC: 0.4 MG/DL (ref 0.2–1.2)
BUN SERPL-MCNC: 9 MG/DL (ref 7–25)
BUN/CREAT SERPL: ABNORMAL (CALC) (ref 6–22)
CALCIUM SERPL-MCNC: 8.6 MG/DL (ref 8.6–10.2)
CHLORIDE SERPL-SCNC: 102 MMOL/L (ref 98–110)
CHOLEST SERPL-MCNC: 125 MG/DL
CHOLEST/HDLC SERPL: 5.2 (CALC)
CO2 SERPL-SCNC: 26 MMOL/L (ref 20–32)
CREAT SERPL-MCNC: 0.67 MG/DL (ref 0.5–1.1)
GLOBULIN SER CALC-MCNC: 2.8 G/DL (CALC) (ref 1.9–3.7)
GLUCOSE SERPL-MCNC: 161 MG/DL (ref 65–99)
HBA1C MFR BLD: 7 % OF TOTAL HGB
HDLC SERPL-MCNC: 24 MG/DL
LDLC SERPL CALC-MCNC: 69 MG/DL (CALC)
NONHDLC SERPL-MCNC: 101 MG/DL (CALC)
POTASSIUM SERPL-SCNC: 4.3 MMOL/L (ref 3.5–5.3)
PROT SERPL-MCNC: 6.6 G/DL (ref 6.1–8.1)
SL AMB EGFR AFRICAN AMERICAN: 128 ML/MIN/1.73M2
SL AMB EGFR NON AFRICAN AMERICAN: 111 ML/MIN/1.73M2
SODIUM SERPL-SCNC: 139 MMOL/L (ref 135–146)
T4 FREE SERPL-MCNC: 1.4 NG/DL (ref 0.8–1.8)
TRIGL SERPL-MCNC: 231 MG/DL
TSH SERPL-ACNC: 2.05 MIU/L

## 2018-12-14 ENCOUNTER — TRANSCRIBE ORDERS (OUTPATIENT)
Dept: ADMINISTRATIVE | Facility: HOSPITAL | Age: 38
End: 2018-12-14

## 2018-12-14 DIAGNOSIS — N64.4 PAIN OF LEFT BREAST: Primary | ICD-10-CM

## 2018-12-17 ENCOUNTER — TRANSCRIBE ORDERS (OUTPATIENT)
Dept: ADMINISTRATIVE | Facility: HOSPITAL | Age: 38
End: 2018-12-17

## 2018-12-17 DIAGNOSIS — N64.4 BREAST PAIN, LEFT: Primary | ICD-10-CM

## 2018-12-20 ENCOUNTER — HOSPITAL ENCOUNTER (OUTPATIENT)
Dept: ULTRASOUND IMAGING | Facility: HOSPITAL | Age: 38
Discharge: HOME | End: 2018-12-20
Payer: COMMERCIAL

## 2018-12-20 ENCOUNTER — HOSPITAL ENCOUNTER (OUTPATIENT)
Dept: MAMMOGRAPHY | Facility: CLINIC | Age: 38
Discharge: HOME | End: 2018-12-20
Payer: COMMERCIAL

## 2018-12-20 VITALS — BODY MASS INDEX: 48.1 KG/M2 | WEIGHT: 245 LBS | HEIGHT: 60 IN

## 2018-12-20 DIAGNOSIS — N64.4 PAIN OF LEFT BREAST: ICD-10-CM

## 2018-12-20 DIAGNOSIS — N64.4 BREAST PAIN, LEFT: ICD-10-CM

## 2018-12-20 PROCEDURE — 76642 ULTRASOUND BREAST LIMITED: CPT

## 2018-12-20 PROCEDURE — 77066 DX MAMMO INCL CAD BI: CPT

## 2019-01-08 ENCOUNTER — OFFICE VISIT (OUTPATIENT)
Dept: FAMILY MEDICINE CLINIC | Facility: CLINIC | Age: 39
End: 2019-01-08
Payer: COMMERCIAL

## 2019-01-08 VITALS
BODY MASS INDEX: 42.41 KG/M2 | SYSTOLIC BLOOD PRESSURE: 130 MMHG | DIASTOLIC BLOOD PRESSURE: 82 MMHG | WEIGHT: 224.6 LBS | HEIGHT: 61 IN

## 2019-01-08 DIAGNOSIS — E88.81 METABOLIC SYNDROME X: ICD-10-CM

## 2019-01-08 DIAGNOSIS — E11.9 CONTROLLED TYPE 2 DIABETES MELLITUS WITHOUT COMPLICATION, WITHOUT LONG-TERM CURRENT USE OF INSULIN (HCC): ICD-10-CM

## 2019-01-08 DIAGNOSIS — E06.3 HYPOTHYROIDISM DUE TO HASHIMOTO'S THYROIDITIS: ICD-10-CM

## 2019-01-08 DIAGNOSIS — L98.9 SKIN LESION OF FACE: Primary | ICD-10-CM

## 2019-01-08 DIAGNOSIS — E03.8 HYPOTHYROIDISM DUE TO HASHIMOTO'S THYROIDITIS: ICD-10-CM

## 2019-01-08 DIAGNOSIS — M54.2 NECK PAIN: ICD-10-CM

## 2019-01-08 DIAGNOSIS — E11.9 TYPE 2 DIABETES MELLITUS WITHOUT COMPLICATION, WITHOUT LONG-TERM CURRENT USE OF INSULIN (HCC): ICD-10-CM

## 2019-01-08 DIAGNOSIS — Z23 ENCOUNTER FOR IMMUNIZATION: ICD-10-CM

## 2019-01-08 PROBLEM — E88.810 METABOLIC SYNDROME X: Status: ACTIVE | Noted: 2019-01-08

## 2019-01-08 PROBLEM — I48.3 TYPICAL ATRIAL FLUTTER (HCC): Status: ACTIVE | Noted: 2018-04-14

## 2019-01-08 LAB — SL AMB POCT HEMOGLOBIN AIC: 7.7 (ref ?–6.5)

## 2019-01-08 PROCEDURE — 1036F TOBACCO NON-USER: CPT | Performed by: FAMILY MEDICINE

## 2019-01-08 PROCEDURE — 83036 HEMOGLOBIN GLYCOSYLATED A1C: CPT | Performed by: FAMILY MEDICINE

## 2019-01-08 PROCEDURE — 99214 OFFICE O/P EST MOD 30 MIN: CPT | Performed by: FAMILY MEDICINE

## 2019-01-08 PROCEDURE — 3008F BODY MASS INDEX DOCD: CPT | Performed by: FAMILY MEDICINE

## 2019-01-08 PROCEDURE — 3045F PR MOST RECENT HEMOGLOBIN A1C LEVEL 7.0-9.0%: CPT | Performed by: FAMILY MEDICINE

## 2019-01-10 PROBLEM — I48.3 TYPICAL ATRIAL FLUTTER (HCC): Status: RESOLVED | Noted: 2018-04-14 | Resolved: 2019-01-10

## 2019-01-10 NOTE — PROGRESS NOTES
Assessment/Plan:    Refer skin lesion of the face to plastics for best cosmetic outcome  Range-of-motion exercises demonstrated for neck stiffness  Patient overdue for labs  Full labs ordered  A1c done in the office today was 7 7  Patient is extreme risk factor modification regarding her diabetes, metabolic syndrome, morbid obesity  Patient will take her metformin twice daily and be recheck in 3 months  Recommend yearly diabetic foot and eye examination  Patient is to maintain more compliant schedule regarding her health  Next visit offer Pneumovax 23  Diagnoses and all orders for this visit:    Skin lesion of face    Neck pain    Hypothyroidism due to Hashimoto's thyroiditis  -     TSH, 3rd generation; Future  -     T4, free; Future  -     Lipid panel; Future  -     Comprehensive metabolic panel; Future    Controlled type 2 diabetes mellitus without complication, without long-term current use of insulin ([facility])  -     POCT hemoglobin A1c  -     Lipid panel; Future  -     HEMOGLOBIN A1C W/ EAG ESTIMATION; Future  -     Comprehensive metabolic panel; Future  -     Microalbumin / creatinine urine ratio    Type 2 diabetes mellitus without complication, without long-term current use of insulin (HCC)    Metabolic syndrome X    Encounter for immunization  -     PNEUMOCOCCAL CONJUGATE VACCINE 13-VALENT GREATER THAN 6 MONTHS  -     PNEUMOCOCCAL POLYSACCHARIDE VACCINE 23-VALENT =>3YO SQ IM        1  Skin lesion of face     2  Neck pain     3  Hypothyroidism due to Hashimoto's thyroiditis  TSH, 3rd generation    T4, free    Lipid panel    Comprehensive metabolic panel   4  Controlled type 2 diabetes mellitus without complication, without long-term current use of insulin (HCC)  POCT hemoglobin A1c    Lipid panel    HEMOGLOBIN A1C W/ EAG ESTIMATION    Comprehensive metabolic panel    Microalbumin / creatinine urine ratio   5   Type 2 diabetes mellitus without complication, without long-term current use of insulin ([facility] )     6  Metabolic syndrome X     7  Encounter for immunization  PNEUMOCOCCAL CONJUGATE VACCINE 13-VALENT GREATER THAN 6 MONTHS    PNEUMOCOCCAL POLYSACCHARIDE VACCINE 23-VALENT =>1YO SQ IM       Subjective:        Patient ID: Mykel Chung is a 43 y o  female  Chief Complaint   Patient presents with    Procedure     remove bump on face; lump on L side of mouth, has been there for a few years, no pain    Leg Pain     L arm pain, pt states yesterday morning she could not raise her arm without it being in pian  Pt states that she is having a hard time looking to the L        Patient with skin lesion on her left lateral lip area  It has been there for greater than 6 months  It does get irritated from time to time  Will cut this morning with a stiff neck  No injury  Has not had labs for quite some time  The following portions of the patient's history were reviewed and updated as appropriate: past medical history, past surgical history and problem list       Review of Systems   Constitutional: Negative for appetite change, fatigue, fever and unexpected weight change  HENT: Negative for congestion, ear pain, postnasal drip, rhinorrhea, sinus pain, sinus pressure and sore throat  Eyes: Negative for redness and visual disturbance  Respiratory: Negative for chest tightness and shortness of breath  Cardiovascular: Negative for chest pain, palpitations and leg swelling  Gastrointestinal: Negative for abdominal distention, abdominal pain, diarrhea and nausea  Endocrine: Negative for cold intolerance and heat intolerance  Genitourinary: Negative for dysuria and hematuria  Musculoskeletal: Positive for neck pain and neck stiffness  Negative for arthralgias, gait problem and myalgias  Skin: Negative for pallor and rash  Skin lesion left lateral lip area  Neurological: Negative for dizziness and headaches  Psychiatric/Behavioral: Negative for behavioral problems   The patient is not nervous/anxious  Objective:  /82 (BP Location: Left arm, Patient Position: Sitting, Cuff Size: Large)   Ht 5' 0 5" (1 537 m)   Wt 102 kg (224 lb 9 6 oz)   LMP 12/18/2018   BMI 43 14 kg/m²        Physical Exam   Constitutional: She is oriented to person, place, and time  She appears well-nourished  No distress  Morbidly obese   HENT:   Head: Normocephalic and atraumatic  Right Ear: Tympanic membrane normal    Left Ear: Tympanic membrane normal    Nose: Nose normal    Mouth/Throat: Oropharynx is clear and moist    Eyes: Pupils are equal, round, and reactive to light  Conjunctivae and EOM are normal  No scleral icterus  Neck: No thyromegaly present  Cardiovascular: Normal rate, regular rhythm and intact distal pulses  No murmur heard  Pulses:       Carotid pulses are 0 on the right side, and 0 on the left side  Pulmonary/Chest: Effort normal and breath sounds normal  She has no wheezes  Abdominal: Soft  Bowel sounds are normal  She exhibits no distension and no mass  There is no tenderness  Musculoskeletal: Normal range of motion  She exhibits no edema  Decreased cervical range of motion especially to the left  Slight reduction in left upper extremity abduction  Lymphadenopathy:     She has no cervical adenopathy  Neurological: She is alert and oriented to person, place, and time  She has normal reflexes  No cranial nerve deficit  Coordination normal    Skin: Skin is warm  No pallor  Small cyst-like lesion left of lip border  Psychiatric: She has a normal mood and affect  Her behavior is normal  Thought content normal    Nursing note and vitals reviewed

## 2019-02-02 NOTE — RESULT NOTES
Verified Results  (1) LIPID PANEL, FASTING [id number] 07:97JJ [employer]     Test Name Result Flag Reference   CHOLESTEROL, TOTAL 147 mg/dL  125-200   HDL CHOLESTEROL 27 mg/dL L > OR = 46   TRIGLICERIDES 571 mg/dL H <150   LDL-CHOLESTEROL 76 mg/dL (calc)  <130   Desirable range <100 mg/dL for patients with CHD or  diabetes and <70 mg/dL for diabetic patients with  known heart disease  CHOL/HDLC RATIO 5 4 (calc) H < OR = 5 0   NON HDL CHOLESTEROL 120 mg/dL (calc)     Target for non-HDL cholesterol is 30 mg/dL higher than   LDL cholesterol target  (1) COMPREHENSIVE METABOLIC PANEL [id number] 50:44SL Lafrances Peace     Test Name Result Flag Reference   GLUCOSE 128 mg/dL H 65-99   Fasting reference interval   UREA NITROGEN (BUN) 10 mg/dL  7-25   CREATININE 0 72 mg/dL  0 50-1 10   eGFR NON-AFR  AMERICAN 107 mL/min/1 73m2  > OR = 60   eGFR AFRICAN AMERICAN 124 mL/min/1 73m2  > OR = 60   BUN/CREATININE RATIO   8-37   NOT APPLICABLE (calc)   SODIUM 139 mmol/L  135-146   POTASSIUM 4 4 mmol/L  3 5-5 3   CHLORIDE 102 mmol/L     CARBON DIOXIDE 27 mmol/L  20-31   CALCIUM 8 7 mg/dL  8 6-10 2   PROTEIN, TOTAL 6 8 g/dL  6 1-8 1   ALBUMIN 3 9 g/dL  3 6-5 1   GLOBULIN 2 9 g/dL (calc)  1 9-3 7   ALBUMIN/GLOBULIN RATIO 1 3 (calc)  1 0-2 5   BILIRUBIN, TOTAL 0 4 mg/dL  0 2-1 2   ALKALINE PHOSPHATASE 94 U/L     AST 12 U/L  10-30   ALT 13 U/L  6-29   GLUCOSE 128 mg/dL H 65-99   Fasting reference interval   UREA NITROGEN (BUN) 10 mg/dL  7-25   CREATININE 0 72 mg/dL  0 50-1 10   eGFR NON-AFR   AMERICAN 107 mL/min/1 73m2  > OR = 60   eGFR AFRICAN AMERICAN 124 mL/min/1 73m2  > OR = 60   BUN/CREATININE RATIO   3-32   NOT APPLICABLE (calc)   SODIUM 139 mmol/L  135-146   POTASSIUM 4 4 mmol/L  3 5-5 3   CHLORIDE 102 mmol/L     CARBON DIOXIDE 27 mmol/L  20-31   CALCIUM 8 7 mg/dL  8 6-10 2   PROTEIN, TOTAL 6 8 g/dL  6 1-8 1   ALBUMIN 3 9 g/dL  3 6-5 1   GLOBULIN 2 9 g/dL (calc)  1 9-3 7   ALBUMIN/GLOBULIN RATIO 1 3 (calc) 1 0-2 5   BILIRUBIN, TOTAL 0 4 mg/dL  0 2-1 2   ALKALINE PHOSPHATASE 94 U/L     AST 12 U/L  10-30   ALT 13 U/L  6-29           (1) C-REACTIVE PROTEIN [id number] 63:79JZ Deneise Negrete     Test Name Result Flag Reference   C-REACTIVE PROTEIN 2 15 mg/dL H <0 80   Please be advised that patients taking Carboxypenicillins  may exhibit falsely decreased C-Reactive Protein levels  due to an analytical interference in this assay  (1) T4, FREE [id number] 03:24BV Deneise Negrete     Test Name Result Flag Reference   T4, FREE 1 2 ng/dL  0 8-1 8                 (Q) SED RATE BY MODIFIED Barby Oconto [id number] 01:50GT Deneise Negrete     Test Name Result Flag Reference   SED RATE BY MODIFIED$WESTERGREN 22 mm/h H < OR = 20     (Q) RAKESH SCREEN, IFA, WITH REFLEX TO TITER AND PATTERN [id number] 97:36UW Deneise Negrete     Test Name Result Flag Reference   RAKESH SCREEN, IFA NEGATIVE  NEGATIVE   Although the specimen was negative for anti-  nuclear antibodies (RAKESH), the presence of  cytoplasmic fluorescence was noted on the  HEp-2 slide  Other reactivities (e g , anti-  mitochondrial antibodies or anti-smooth muscle  antibodies) may be responsible for this  fluorescence  RAKESH IFA is a first line screen for detecting the  presence of up to approximately 150 autoantibodies in  various autoimmune diseases  A negative RAKESH IFA result  suggests RAKESH-associated autoimmune diseases are not  present at this time  Visit Physician FAQs for interpretation of all  antibodies in the [place], prevalence, and association  with diseases at http://education  [employer]/  [id number]     (1) RF QUANTITATION [id number] 91:46YW Deneise Negrete     Test Name Result Flag Reference   RHEUMATOID FACTOR 111 IU/mL H <14     (Q) TSH, 3RD GENERATION [id number] 72:63JP Deneise Negrete     Test Name Result Flag Reference   TSH 4 08 mIU/L     Reference Range                         > or = 20 Years  0 40-4 50 Pregnancy Ranges            First trimester    0 26-2 66            Second trimester   0 55-2 73            Third trimester    0 43-2 91     *(Q) VITAMIN D, 25-HYDROXY, LC/MS/MS [id number] 73:13BS [employer] [facility]     Test Name Result Flag Reference   VITAMIN D, 25-OH, TOTAL 39 ng/mL     Vitamin D Status         25-OH Vitamin D:     Deficiency:                    <20 ng/mL  Insufficiency:             20 - 29 ng/mL  Optimal:                 > or = 30 ng/mL     For 25-OH Vitamin D testing on patients on   D2-supplementation and patients for whom quantitation   of D2 and D3 fractions is required, the QuestAssureD(TM)  25-OH VIT D, (D2,D3), LC/MS/MS is recommended: order   code 94086 (patients >2yrs)  For more information on this test, go to:  http://Furious/faq/REC109  (This link is being provided for   informational/educational purposes only )     (Q) HEMOGLOBIN A1c [id number] 60:43MK Quyen Carcamo   REPORT COMMENT:  FASTING:YES     Test Name Result Flag Reference   HEMOGLOBIN A1c 6 6 % of total Hgb H <5 7   According to ADA guidelines, hemoglobin A1c <7 0%  represents optimal control in non-pregnant diabetic  patients  Different metrics may apply to specific  patient populations  Standards of Medical Care in  [phone]  Diabetes Care  2013;36:s11-s66     For the purpose of screening for the presence of  diabetes  <5 7%       Consistent with the absence of diabetes  5 7-6 4%    Consistent with increased risk for diabetes              (prediabetes)  >or=6 5%    Consistent with diabetes     This assay result is consistent with diabetes  mellitus  Currently, no consensus exists for use of hemoglobin  A1c for diagnosis of diabetes for children  40

## 2019-03-15 LAB
LEFT EYE DIABETIC RETINOPATHY: NORMAL
RIGHT EYE DIABETIC RETINOPATHY: NORMAL

## 2019-03-28 DIAGNOSIS — E11.9 TYPE 2 DIABETES MELLITUS WITHOUT COMPLICATION, WITHOUT LONG-TERM CURRENT USE OF INSULIN (HCC): ICD-10-CM

## 2019-03-28 RX ORDER — METFORMIN HYDROCHLORIDE 500 MG/1
TABLET, EXTENDED RELEASE ORAL
Qty: 180 TABLET | Refills: 1 | Status: SHIPPED | OUTPATIENT
Start: 2019-03-28 | End: 2019-11-30 | Stop reason: SDUPTHER

## 2019-05-07 ENCOUNTER — OFFICE VISIT (OUTPATIENT)
Dept: FAMILY MEDICINE CLINIC | Facility: CLINIC | Age: 39
End: 2019-05-07
Payer: COMMERCIAL

## 2019-05-07 VITALS
BODY MASS INDEX: 43.69 KG/M2 | SYSTOLIC BLOOD PRESSURE: 140 MMHG | HEIGHT: 61 IN | DIASTOLIC BLOOD PRESSURE: 98 MMHG | WEIGHT: 231.4 LBS

## 2019-05-07 DIAGNOSIS — E66.01 MORBID OBESITY (HCC): ICD-10-CM

## 2019-05-07 DIAGNOSIS — E03.8 HYPOTHYROIDISM DUE TO HASHIMOTO'S THYROIDITIS: ICD-10-CM

## 2019-05-07 DIAGNOSIS — I10 ESSENTIAL HYPERTENSION: ICD-10-CM

## 2019-05-07 DIAGNOSIS — E06.3 HYPOTHYROIDISM DUE TO HASHIMOTO'S THYROIDITIS: ICD-10-CM

## 2019-05-07 DIAGNOSIS — E78.2 MIXED HYPERLIPIDEMIA: ICD-10-CM

## 2019-05-07 DIAGNOSIS — E11.9 TYPE 2 DIABETES MELLITUS WITHOUT COMPLICATION, WITHOUT LONG-TERM CURRENT USE OF INSULIN (HCC): Primary | ICD-10-CM

## 2019-05-07 LAB — SL AMB POCT HEMOGLOBIN AIC: 6.5 (ref ?–6.5)

## 2019-05-07 PROCEDURE — 4010F ACE/ARB THERAPY RXD/TAKEN: CPT | Performed by: FAMILY MEDICINE

## 2019-05-07 PROCEDURE — 83036 HEMOGLOBIN GLYCOSYLATED A1C: CPT | Performed by: FAMILY MEDICINE

## 2019-05-07 PROCEDURE — 99213 OFFICE O/P EST LOW 20 MIN: CPT | Performed by: FAMILY MEDICINE

## 2019-05-07 PROCEDURE — 3044F HG A1C LEVEL LT 7.0%: CPT | Performed by: FAMILY MEDICINE

## 2019-05-07 PROCEDURE — 3008F BODY MASS INDEX DOCD: CPT | Performed by: FAMILY MEDICINE

## 2019-05-07 RX ORDER — TELMISARTAN 40 MG/1
40 TABLET ORAL DAILY
Qty: 30 TABLET | Refills: 5 | Status: SHIPPED | OUTPATIENT
Start: 2019-05-07 | End: 2019-08-01 | Stop reason: SDUPTHER

## 2019-07-16 LAB
ALBUMIN SERPL-MCNC: 3.9 G/DL (ref 3.6–5.1)
ALBUMIN/GLOB SERPL: 1.5 (CALC) (ref 1–2.5)
ALP SERPL-CCNC: 83 U/L (ref 33–115)
ALT SERPL-CCNC: 16 U/L (ref 6–29)
AST SERPL-CCNC: 11 U/L (ref 10–30)
BILIRUB SERPL-MCNC: 0.4 MG/DL (ref 0.2–1.2)
BUN SERPL-MCNC: 11 MG/DL (ref 7–25)
BUN/CREAT SERPL: ABNORMAL (CALC) (ref 6–22)
CALCIUM SERPL-MCNC: 8.9 MG/DL (ref 8.6–10.2)
CHLORIDE SERPL-SCNC: 103 MMOL/L (ref 98–110)
CHOLEST SERPL-MCNC: 143 MG/DL
CHOLEST/HDLC SERPL: 6.2 (CALC)
CO2 SERPL-SCNC: 27 MMOL/L (ref 20–32)
CREAT SERPL-MCNC: 0.71 MG/DL (ref 0.5–1.1)
GLOBULIN SER CALC-MCNC: 2.6 G/DL (CALC) (ref 1.9–3.7)
GLUCOSE SERPL-MCNC: 160 MG/DL (ref 65–99)
HDLC SERPL-MCNC: 23 MG/DL
LDLC SERPL CALC-MCNC: 88 MG/DL (CALC)
NONHDLC SERPL-MCNC: 120 MG/DL (CALC)
POTASSIUM SERPL-SCNC: 4.2 MMOL/L (ref 3.5–5.3)
PROT SERPL-MCNC: 6.5 G/DL (ref 6.1–8.1)
SL AMB EGFR AFRICAN AMERICAN: 123 ML/MIN/1.73M2
SL AMB EGFR NON AFRICAN AMERICAN: 107 ML/MIN/1.73M2
SODIUM SERPL-SCNC: 137 MMOL/L (ref 135–146)
T4 FREE SERPL-MCNC: 1.1 NG/DL (ref 0.8–1.8)
TRIGL SERPL-MCNC: 233 MG/DL
TSH SERPL-ACNC: 6.01 MIU/L

## 2019-07-19 ENCOUNTER — OFFICE VISIT (OUTPATIENT)
Dept: FAMILY MEDICINE CLINIC | Facility: CLINIC | Age: 39
End: 2019-07-19
Payer: COMMERCIAL

## 2019-07-19 VITALS
SYSTOLIC BLOOD PRESSURE: 132 MMHG | WEIGHT: 229.38 LBS | HEIGHT: 61 IN | BODY MASS INDEX: 43.3 KG/M2 | TEMPERATURE: 97.4 F | RESPIRATION RATE: 16 BRPM | OXYGEN SATURATION: 98 % | HEART RATE: 87 BPM | DIASTOLIC BLOOD PRESSURE: 86 MMHG

## 2019-07-19 DIAGNOSIS — E03.8 HYPOTHYROIDISM DUE TO HASHIMOTO'S THYROIDITIS: ICD-10-CM

## 2019-07-19 DIAGNOSIS — E66.01 MORBID OBESITY (HCC): ICD-10-CM

## 2019-07-19 DIAGNOSIS — E06.3 HYPOTHYROIDISM DUE TO HASHIMOTO'S THYROIDITIS: ICD-10-CM

## 2019-07-19 DIAGNOSIS — E78.2 MIXED HYPERLIPIDEMIA: ICD-10-CM

## 2019-07-19 DIAGNOSIS — E11.9 TYPE 2 DIABETES MELLITUS WITHOUT COMPLICATION, WITHOUT LONG-TERM CURRENT USE OF INSULIN (HCC): Primary | ICD-10-CM

## 2019-07-19 PROCEDURE — 3008F BODY MASS INDEX DOCD: CPT | Performed by: FAMILY MEDICINE

## 2019-07-19 PROCEDURE — 99214 OFFICE O/P EST MOD 30 MIN: CPT | Performed by: FAMILY MEDICINE

## 2019-07-19 RX ORDER — LEVOTHYROXINE SODIUM 0.15 MG/1
150 TABLET ORAL
Qty: 30 TABLET | Refills: 5 | Status: SHIPPED | OUTPATIENT
Start: 2019-07-19 | End: 2020-03-24

## 2019-08-01 DIAGNOSIS — I10 ESSENTIAL HYPERTENSION: ICD-10-CM

## 2019-08-01 RX ORDER — TELMISARTAN 40 MG/1
40 TABLET ORAL DAILY
Qty: 30 TABLET | Refills: 5 | Status: SHIPPED | OUTPATIENT
Start: 2019-08-01 | End: 2019-08-02 | Stop reason: SDUPTHER

## 2019-08-02 DIAGNOSIS — I10 ESSENTIAL HYPERTENSION: ICD-10-CM

## 2019-08-02 PROCEDURE — 4010F ACE/ARB THERAPY RXD/TAKEN: CPT | Performed by: FAMILY MEDICINE

## 2019-08-02 RX ORDER — TELMISARTAN 40 MG/1
40 TABLET ORAL DAILY
Qty: 90 TABLET | Refills: 3 | Status: SHIPPED | OUTPATIENT
Start: 2019-08-02 | End: 2019-12-04 | Stop reason: SDUPTHER

## 2019-08-02 NOTE — TELEPHONE ENCOUNTER
[facility] sent fax stating that the pt could get a 90 day supply with 3 refills   Please advise and authorize, thanks

## 2019-08-03 NOTE — PROGRESS NOTES
Assessment/Plan:    A1c is 6 5  LDL cholesterol is less than 100  When patient is close to the postmenopausal add statin even though LDL is less than 100  Declines to add today  Blood pressure her stable  TSH is out of range go for raise levothyroxine to 150 daily and repeat a TSH in 4-6 weeks  Otherwise recheck in 6 months with repeat labs  Refer to local dietitian with regards to obesity and diabetes  Recommend yearly mammography, gyn eval   Recommend yearly diabetic eye and foot examination  Diagnoses and all orders for this visit:    Type 2 diabetes mellitus without complication, without long-term current use of insulin (HCC)  -     Hemoglobin A1C; Future    Hypothyroidism due to Hashimoto's thyroiditis  -     levothyroxine 150 mcg tablet; Take 1 tablet (150 mcg total) by mouth daily in the early morning  -     TSH, 3rd generation; Future  -     TSH, 3rd generation; Future    Morbid obesity ([address] )    Mixed hyperlipidemia        1  Type 2 diabetes mellitus without complication, without long-term current use of insulin (HCC)  Hemoglobin A1C   2  Hypothyroidism due to Hashimoto's thyroiditis  levothyroxine 150 mcg tablet    TSH, 3rd generation    TSH, 3rd generation   3  Morbid obesity ([address] )     4  Mixed hyperlipidemia         Subjective:        Patient ID: Kaitlin Ramos is a 35 y o  female  Chief Complaint   Patient presents with    Diabetes    Hypothyroidism    Hypertension       Patient here for recheck of diabetes and labs  Overall      The following portions of the patient's history were reviewed and updated as appropriate: past medical history, past surgical history and problem list       Review of Systems   Constitutional: Negative for appetite change, fatigue, fever and unexpected weight change  HENT: Negative for congestion, ear pain, postnasal drip, rhinorrhea, sinus pressure, sinus pain and sore throat  Eyes: Negative for redness and visual disturbance     Respiratory: Negative for chest tightness and shortness of breath  Cardiovascular: Negative for chest pain, palpitations and leg swelling  Gastrointestinal: Negative for abdominal distention, abdominal pain, diarrhea and nausea  Endocrine: Negative for cold intolerance and heat intolerance  Genitourinary: Negative for dysuria and hematuria  Musculoskeletal: Negative for arthralgias, gait problem and myalgias  Skin: Negative for pallor and rash  Neurological: Negative for dizziness and headaches  Psychiatric/Behavioral: Negative for behavioral problems  The patient is not nervous/anxious  Objective:  /86 (BP Location: Left arm, Patient Position: Sitting, Cuff Size: Large)   Pulse 87   Temp (!) 97 4 °F (36 3 °C) (Temporal)   Resp 16   Ht 5' 1" (1 549 m)   Wt 104 kg (229 lb 6 oz)   SpO2 98%   BMI 43 34 kg/m²        Physical Exam   Constitutional: She is oriented to person, place, and time  She appears well-nourished  No distress  Morbidly   HENT:   Head: Normocephalic and atraumatic  Right Ear: Tympanic membrane normal    Left Ear: Tympanic membrane normal    Nose: Nose normal    Mouth/Throat: Oropharynx is clear and moist    Eyes: Pupils are equal, round, and reactive to light  Conjunctivae and EOM are normal  No scleral icterus  Neck: Normal range of motion  Neck supple  No thyromegaly present  Cardiovascular: Normal rate, regular rhythm and intact distal pulses  No murmur heard  Pulses:       Carotid pulses are 0 on the right side, and 0 on the left side  Pulmonary/Chest: Effort normal and breath sounds normal  She has no wheezes  Abdominal: Soft  She exhibits no distension  Musculoskeletal: She exhibits no edema  Lymphadenopathy:     She has no cervical adenopathy  Neurological: She is alert and oriented to person, place, and time  She has normal reflexes  No cranial nerve deficit  Coordination normal    Skin: Skin is warm  No pallor  Psychiatric: She has a normal mood and affect  Her behavior is normal  Thought content normal    Nursing note and vitals reviewed

## 2019-08-08 ENCOUNTER — TRANSCRIBE ORDERS (OUTPATIENT)
Dept: ADMINISTRATIVE | Facility: HOSPITAL | Age: 39
End: 2019-08-08

## 2019-08-08 DIAGNOSIS — R92.8 ABNORMAL MAMMOGRAM: Primary | ICD-10-CM

## 2019-08-13 ENCOUNTER — HOSPITAL ENCOUNTER (OUTPATIENT)
Dept: MAMMOGRAPHY | Facility: CLINIC | Age: 39
Discharge: HOME | End: 2019-08-13
Payer: COMMERCIAL

## 2019-08-13 VITALS — WEIGHT: 229 LBS | BODY MASS INDEX: 43.23 KG/M2 | HEIGHT: 61 IN

## 2019-08-13 DIAGNOSIS — R92.8 ABNORMAL MAMMOGRAM: ICD-10-CM

## 2019-08-13 PROCEDURE — 77066 DX MAMMO INCL CAD BI: CPT

## 2019-09-06 LAB — TSH SERPL-ACNC: 0.76 MIU/L

## 2019-11-30 DIAGNOSIS — E11.9 TYPE 2 DIABETES MELLITUS WITHOUT COMPLICATION, WITHOUT LONG-TERM CURRENT USE OF INSULIN (HCC): ICD-10-CM

## 2019-12-02 RX ORDER — METFORMIN HYDROCHLORIDE 500 MG/1
TABLET, EXTENDED RELEASE ORAL
Qty: 180 TABLET | Refills: 4 | Status: SHIPPED | OUTPATIENT
Start: 2019-12-02 | End: 2020-10-12 | Stop reason: SDUPTHER

## 2019-12-04 ENCOUNTER — OFFICE VISIT (OUTPATIENT)
Dept: FAMILY MEDICINE CLINIC | Facility: CLINIC | Age: 39
End: 2019-12-04
Payer: COMMERCIAL

## 2019-12-04 VITALS
HEART RATE: 76 BPM | SYSTOLIC BLOOD PRESSURE: 118 MMHG | DIASTOLIC BLOOD PRESSURE: 70 MMHG | TEMPERATURE: 97 F | HEIGHT: 61 IN | WEIGHT: 228 LBS | BODY MASS INDEX: 43.05 KG/M2 | RESPIRATION RATE: 16 BRPM | OXYGEN SATURATION: 98 %

## 2019-12-04 DIAGNOSIS — I10 ESSENTIAL HYPERTENSION: ICD-10-CM

## 2019-12-04 DIAGNOSIS — E11.9 TYPE 2 DIABETES MELLITUS WITHOUT COMPLICATION, WITHOUT LONG-TERM CURRENT USE OF INSULIN (HCC): Primary | ICD-10-CM

## 2019-12-04 DIAGNOSIS — E66.01 MORBID OBESITY (HCC): ICD-10-CM

## 2019-12-04 DIAGNOSIS — E03.9 ACQUIRED HYPOTHYROIDISM: ICD-10-CM

## 2019-12-04 LAB
CREAT UR-MCNC: 105 MG/DL
MICROALBUMIN UR-MCNC: 9.7 MG/L (ref 0–20)
MICROALBUMIN/CREAT 24H UR: 9 MG/G CREATININE (ref 0–30)

## 2019-12-04 PROCEDURE — 3008F BODY MASS INDEX DOCD: CPT | Performed by: FAMILY MEDICINE

## 2019-12-04 PROCEDURE — 3061F NEG MICROALBUMINURIA REV: CPT | Performed by: FAMILY MEDICINE

## 2019-12-04 PROCEDURE — 99214 OFFICE O/P EST MOD 30 MIN: CPT | Performed by: FAMILY MEDICINE

## 2019-12-04 PROCEDURE — 3078F DIAST BP <80 MM HG: CPT | Performed by: FAMILY MEDICINE

## 2019-12-04 PROCEDURE — 3074F SYST BP LT 130 MM HG: CPT | Performed by: FAMILY MEDICINE

## 2019-12-04 PROCEDURE — 82043 UR ALBUMIN QUANTITATIVE: CPT | Performed by: FAMILY MEDICINE

## 2019-12-04 PROCEDURE — 82570 ASSAY OF URINE CREATININE: CPT | Performed by: FAMILY MEDICINE

## 2019-12-04 PROCEDURE — 4010F ACE/ARB THERAPY RXD/TAKEN: CPT | Performed by: FAMILY MEDICINE

## 2019-12-04 RX ORDER — TELMISARTAN 40 MG/1
40 TABLET ORAL DAILY
Qty: 90 TABLET | Refills: 3 | Status: SHIPPED | OUTPATIENT
Start: 2019-12-04 | End: 2020-10-26 | Stop reason: SDUPTHER

## 2019-12-05 LAB
HBA1C MFR BLD: 6.9 % OF TOTAL HGB
TSH SERPL-ACNC: 1.2 MIU/L

## 2019-12-11 NOTE — PROGRESS NOTES
Assessment/Plan:    A1c is 6 9  Blood pressure stable  TSH in range  Continue ARB and metformin  Continue same dose of levothyroxine  Discussed starting statin within the next visit a 2 based on ADA guidelines  Recommend yearly diabetic eye and foot examination  Recommend routine mammography and gyn follow-up  Diagnoses and all orders for this visit:    Type 2 diabetes mellitus without complication, without long-term current use of insulin (HCC)  -     Comprehensive metabolic panel; Future  -     Hemoglobin A1C; Future  -     Lipid panel; Future  -     Microalbumin / creatinine urine ratio    Essential hypertension  -     telmisartan (MICARDIS) 40 mg tablet; Take 1 tablet (40 mg total) by mouth daily    Acquired hypothyroidism  -     TSH, 3rd generation; Future    Morbid obesity ([address] )        1  Type 2 diabetes mellitus without complication, without long-term current use of insulin (HCC)  Comprehensive metabolic panel    Hemoglobin A1C    Lipid panel    Microalbumin / creatinine urine ratio   2  Essential hypertension  telmisartan (MICARDIS) 40 mg tablet   3  Acquired hypothyroidism  TSH, 3rd generation   4  Morbid obesity (HCC)         Subjective:        Patient ID: Lelo Keen is a 35 y o  female  Chief Complaint   Patient presents with    Follow-up     4 month follow up        Patient here for diabetic check and blood pressure check  Overall feels well      The following portions of the patient's history were reviewed and updated as appropriate: past medical history, past surgical history and problem list       Review of Systems   Constitutional: Negative for appetite change, fatigue, fever and unexpected weight change  HENT: Negative for congestion, ear pain, postnasal drip, rhinorrhea, sinus pressure, sinus pain and sore throat  Eyes: Negative for redness and visual disturbance  Respiratory: Negative for chest tightness and shortness of breath      Cardiovascular: Negative for chest pain, palpitations and leg swelling  Gastrointestinal: Negative for abdominal distention, abdominal pain, diarrhea and nausea  Endocrine: Negative for cold intolerance and heat intolerance  Genitourinary: Negative for dysuria and hematuria  Musculoskeletal: Negative for arthralgias, gait problem and myalgias  Skin: Negative for pallor and rash  Neurological: Negative for dizziness and headaches  Psychiatric/Behavioral: Negative for behavioral problems  The patient is not nervous/anxious  Objective:  /70   Pulse 76   Temp (!) 97 °F (36 1 °C) (Temporal)   Resp 16   Ht 5' 1" (1 549 m)   Wt 103 kg (228 lb)   SpO2 98%   BMI 43 08 kg/m²        Physical Exam   Constitutional: She is oriented to person, place, and time  She appears well-nourished  No distress  Morbidly obese   HENT:   Head: Normocephalic and atraumatic  Right Ear: Tympanic membrane normal    Left Ear: Tympanic membrane normal    Mouth/Throat: Oropharynx is clear and moist    Eyes: Pupils are equal, round, and reactive to light  Conjunctivae and EOM are normal  No scleral icterus  Neck: Normal range of motion  Neck supple  No thyromegaly present  Cardiovascular: Normal rate, regular rhythm, normal heart sounds and intact distal pulses  No murmur heard  Pulses:       Carotid pulses are 0 on the right side, and 0 on the left side  Pulmonary/Chest: Effort normal and breath sounds normal  No respiratory distress  She has no wheezes  Abdominal: Soft  She exhibits no distension  Musculoskeletal: She exhibits no edema  Lymphadenopathy:     She has no cervical adenopathy  Neurological: She is alert and oriented to person, place, and time  She has normal reflexes  No cranial nerve deficit  Skin: Skin is warm  No pallor  Psychiatric: She has a normal mood and affect  Her behavior is normal  Judgment and thought content normal    Nursing note and vitals reviewed

## 2020-02-27 ENCOUNTER — OFFICE VISIT (OUTPATIENT)
Dept: FAMILY MEDICINE CLINIC | Facility: CLINIC | Age: 40
End: 2020-02-27
Payer: COMMERCIAL

## 2020-02-27 VITALS
WEIGHT: 240 LBS | SYSTOLIC BLOOD PRESSURE: 136 MMHG | BODY MASS INDEX: 45.31 KG/M2 | TEMPERATURE: 97.5 F | HEIGHT: 61 IN | HEART RATE: 66 BPM | RESPIRATION RATE: 16 BRPM | OXYGEN SATURATION: 97 % | DIASTOLIC BLOOD PRESSURE: 84 MMHG

## 2020-02-27 DIAGNOSIS — H69.83 DYSFUNCTION OF BOTH EUSTACHIAN TUBES: Primary | ICD-10-CM

## 2020-02-27 PROCEDURE — 1036F TOBACCO NON-USER: CPT | Performed by: NURSE PRACTITIONER

## 2020-02-27 PROCEDURE — 3075F SYST BP GE 130 - 139MM HG: CPT | Performed by: NURSE PRACTITIONER

## 2020-02-27 PROCEDURE — 3008F BODY MASS INDEX DOCD: CPT | Performed by: NURSE PRACTITIONER

## 2020-02-27 PROCEDURE — 2022F DILAT RTA XM EVC RTNOPTHY: CPT | Performed by: NURSE PRACTITIONER

## 2020-02-27 PROCEDURE — 99213 OFFICE O/P EST LOW 20 MIN: CPT | Performed by: NURSE PRACTITIONER

## 2020-02-27 PROCEDURE — 3079F DIAST BP 80-89 MM HG: CPT | Performed by: NURSE PRACTITIONER

## 2020-02-27 NOTE — PATIENT INSTRUCTIONS
Start Flonase, this is an intranasal corticosteroid  This is 2 sprays each nostril once daily  Please be aware that this can cause nasal mucosa irritation  Stop using if you experience any nose bleeds  This can be used for allergy symptoms as well as ear discomfort/pressure  Start over the counter antihistamine like Claritin/zyrtec/allegra  Please call the office if you are experiencing any worsening of symptoms or no symptom improvement  Eustachian Tube Dysfunction   WHAT YOU NEED TO KNOW:   What is eustachian tube dysfunction? Eustachian tube dysfunction (ETD) is a condition that prevents your eustachian tubes from opening properly  It can also cause them to become blocked  Eustachian tubes connect your middle ear to the back of your nose and throat  These tubes open and allow air to flow in and out when you sneeze, swallow, or yawn  What causes or increases my risk of ETD? ETD may be caused by swelling or buildup of mucus in your eustachian tubes  Allergies, a cold, or sinus infection can increase your risk for ETD  Smoking also increases your risk for ETD  What are the signs and symptoms of ETD? · Fullness or pressure in your ears    · Muffled hearing    · Pain in one or both ears    · Ringing in your ears    · Popping or clicking feeling in your ears    · Trouble keeping your balance  How is ETD diagnosed? Your healthcare provider will ask about your symptoms  He will examine your ears, your nose, and the back of your throat  He may also do a hearing test    How is ETD treated? Your ETD may get better on its own without any treatment  You may need any of the following:  · Exercises  such as swallowing, yawning, or chewing gum may help to open your eustachian tubes  Your healthcare provider may also recommend that you take a deep breath and then blow with your mouth shut and your nostrils pinched closed       · Air pressure devices  push air into your nose and eustachian tubes to help relieve air pressure in your ear  · Treatment for allergies  such as decongestants, antihistamines, and nasal steroids may improve ETD  They may help decrease swelling of the eustachian tubes  · Ear tubes  may help to keep your middle ear open  During this procedure, your healthcare provider will cut a small hole in your eardrum  · A myringotomy  is procedure to make a small cut in your eardrum and suction out fluid from your middle ear  · Tuboplasty  is a procedure to widen your eustachian tubes  When should I contact my healthcare provider? · Your symptoms do not improve or get worse  · You have a fever  · You have any hearing loss  · You have questions or concerns about your condition or care  CARE AGREEMENT:   You have the right to help plan your care  Learn about your health condition and how it may be treated  Discuss treatment options with your caregivers to decide what care you want to receive  You always have the right to refuse treatment  The above information is an  only  It is not intended as medical advice for individual conditions or treatments  Talk to your doctor, nurse or pharmacist before following any medical regimen to see if it is safe and effective for you  © 2017 [employer]  Information is for End User's use only and may not be sold, redistributed or otherwise used for commercial purposes  All illustrations and images included in CareNotes® are the copyrighted property of [employer]  or [employer]

## 2020-02-27 NOTE — PROGRESS NOTES
Assessment/Plan:    Dysfunction of both eustachian tubes  Start Flonase, this is an intranasal corticosteroid  This is 2 sprays each nostril once daily  Please be aware that this can cause nasal mucosa irritation  Stop using if you experience any nose bleeds  This can be used for allergy symptoms as well as ear discomfort/pressure  Start over the counter antihistamine like Claritin/zyrtec/allegra  Please call the office if you are experiencing any worsening of symptoms or no symptom improvement  Patient verbalizes understand and agrees with treatment plan  Diagnoses and all orders for this visit:    Dysfunction of both eustachian tubes                Subjective:        Patient ID: Carie Roberson is a 35 y o  female  Chief Complaint   Patient presents with    Ear Fullness     Pt has c/o ear fullness and swooshing sound  times a few weeks and states she has felt dizzy at times        Here for evaluation of ear pain and fullness for about 3-4 weeks  Has had a lot of stress at home lately  Flu went through her household recently  Has fullness and swooshing in both of her ears  Denies chest pain/ pressure/ vision changes/ shortness of breath  Denies any cold symptoms  The following portions of the patient's history were reviewed and updated as appropriate: allergies, current medications, past family history, past social history and problem list     Review of Systems   Constitutional: Negative for chills and fever  HENT: Positive for ear pain (fullness and if she bends over she gets dizzy and hears a swooshing noise)  Eyes: Negative for discharge and visual disturbance  Respiratory: Negative for shortness of breath  Cardiovascular: Negative for chest pain  Gastrointestinal: Negative for constipation and diarrhea  Genitourinary: Negative for difficulty urinating  Musculoskeletal: Negative for joint swelling  Skin: Negative for rash  Neurological: Negative for headaches  Hematological: Negative for adenopathy  Psychiatric/Behavioral: The patient is not nervous/anxious  Objective:  /84   Pulse 66   Temp 97 5 °F (36 4 °C) (Temporal)   Resp 16   Ht 5' 1" (1 549 m)   Wt 109 kg (240 lb)   SpO2 97%   BMI 45 35 kg/m²      Physical Exam   Constitutional: She is oriented to person, place, and time  She appears well-developed  No distress  Morbidly obese   HENT:   Head: Normocephalic and atraumatic  Right Ear: External ear normal  Tympanic membrane is retracted  Tympanic membrane is not perforated and not erythematous  A middle ear effusion is present  Left Ear: External ear normal  Tympanic membrane is erythematous and retracted  Tympanic membrane is not perforated  A middle ear effusion is present  Eyes: Conjunctivae and lids are normal  Right eye exhibits no discharge  Left eye exhibits no discharge  Neck: Normal range of motion  Neck supple  Cardiovascular: Normal rate and regular rhythm  No murmur heard  Pulmonary/Chest: Effort normal and breath sounds normal  No respiratory distress  She has no wheezes  Musculoskeletal: Normal range of motion  She exhibits no deformity  Neurological: She is alert and oriented to person, place, and time  Coordination normal    Skin: Skin is warm and dry  She is not diaphoretic  Psychiatric: She has a normal mood and affect  Her speech is normal and behavior is normal  Judgment and thought content normal  Cognition and memory are normal    Nursing note and vitals reviewed  BMI Counseling: Body mass index is 45 35 kg/m²  The BMI is above normal  Nutrition recommendations include reducing intake of cholesterol  Exercise recommendations include exercising 3-5 times per week and strength training exercises  No pharmacotherapy was ordered             Current Outpatient Medications:     BLISOVI FE 1/20 1-20 MG-MCG per tablet, , Disp: , Rfl:     Blood Glucose Monitoring Suppl (ONE TOUCH ULTRA MINI) w/Device KIT, USE AS DIRECTED DX: E11 8, Disp: , Rfl: 0    fluticasone (FLONASE) 50 mcg/act nasal spray, 2 sprays into each nostril daily, Disp: 16 g, Rfl: 1    glucose blood test strip, Use as instructed, Disp: 100 each, Rfl: 0    Lancets (ONETOUCH ULTRASOFT) lancets, Use as instructed, Disp: 100 each, Rfl: 0    levothyroxine 150 mcg tablet, Take 1 tablet (150 mcg total) by mouth daily in the early morning, Disp: 30 tablet, Rfl: 5    metFORMIN (GLUCOPHAGE-XR) 500 mg 24 hr tablet, TAKE 1 TABLET TWICE A DAY, Disp: 180 tablet, Rfl: 4    Multiple Vitamins-Minerals (MULTIVITAMIN ADULT) CHEW, Chew, Disp: , Rfl:     telmisartan (MICARDIS) 40 mg tablet, Take 1 tablet (40 mg total) by mouth daily, Disp: 90 tablet, Rfl: 3    Vitamin D, Cholecalciferol, 1000 UNITS CAPS, Take by mouth , Disp: , Rfl:   Allergies   Allergen Reactions    Albuterol      SYNCOPE    Cat Hair Extract     Dog Epithelium     Other      ADHESIVE/WELTS    Pollen Extract

## 2020-03-24 DIAGNOSIS — E06.3 HYPOTHYROIDISM DUE TO HASHIMOTO'S THYROIDITIS: ICD-10-CM

## 2020-03-24 DIAGNOSIS — E03.8 HYPOTHYROIDISM DUE TO HASHIMOTO'S THYROIDITIS: ICD-10-CM

## 2020-03-24 RX ORDER — LEVOTHYROXINE SODIUM 0.15 MG/1
150 TABLET ORAL
Qty: 90 TABLET | Refills: 2 | Status: SHIPPED | OUTPATIENT
Start: 2020-03-24 | End: 2020-12-16

## 2020-06-04 ENCOUNTER — TELEPHONE (OUTPATIENT)
Dept: FAMILY MEDICINE CLINIC | Facility: CLINIC | Age: 40
End: 2020-06-04

## 2020-08-06 LAB
ALBUMIN SERPL-MCNC: 4 G/DL (ref 3.6–5.1)
ALBUMIN/GLOB SERPL: 1.4 (CALC) (ref 1–2.5)
ALP SERPL-CCNC: 81 U/L (ref 31–125)
ALT SERPL-CCNC: 13 U/L (ref 6–29)
AST SERPL-CCNC: 11 U/L (ref 10–30)
BILIRUB SERPL-MCNC: 0.4 MG/DL (ref 0.2–1.2)
BUN SERPL-MCNC: 12 MG/DL (ref 7–25)
BUN/CREAT SERPL: ABNORMAL (CALC) (ref 6–22)
CALCIUM SERPL-MCNC: 9.5 MG/DL (ref 8.6–10.2)
CHLORIDE SERPL-SCNC: 101 MMOL/L (ref 98–110)
CHOLEST SERPL-MCNC: 164 MG/DL
CHOLEST/HDLC SERPL: 4.7 (CALC)
CO2 SERPL-SCNC: 28 MMOL/L (ref 20–32)
CREAT SERPL-MCNC: 0.75 MG/DL (ref 0.5–1.1)
GLOBULIN SER CALC-MCNC: 2.8 G/DL (CALC) (ref 1.9–3.7)
GLUCOSE SERPL-MCNC: 202 MG/DL (ref 65–99)
HBA1C MFR BLD: 7.9 % OF TOTAL HGB
HDLC SERPL-MCNC: 35 MG/DL
LDLC SERPL CALC-MCNC: 99 MG/DL (CALC)
NONHDLC SERPL-MCNC: 129 MG/DL (CALC)
POTASSIUM SERPL-SCNC: 4.4 MMOL/L (ref 3.5–5.3)
PROT SERPL-MCNC: 6.8 G/DL (ref 6.1–8.1)
SL AMB EGFR AFRICAN AMERICAN: 115 ML/MIN/1.73M2
SL AMB EGFR NON AFRICAN AMERICAN: 99 ML/MIN/1.73M2
SODIUM SERPL-SCNC: 136 MMOL/L (ref 135–146)
TRIGL SERPL-MCNC: 210 MG/DL
TSH SERPL-ACNC: 1.41 MIU/L

## 2020-08-06 PROCEDURE — 3051F HG A1C>EQUAL 7.0%<8.0%: CPT | Performed by: NURSE PRACTITIONER

## 2020-08-11 ENCOUNTER — TELEPHONE (OUTPATIENT)
Dept: FAMILY MEDICINE CLINIC | Facility: CLINIC | Age: 40
End: 2020-08-11

## 2020-08-11 NOTE — TELEPHONE ENCOUNTER
lmom consent ok requesting pt please return the offices' call in regards to [facility] would like pt to be seen in 4-6 weeks with himself or Caleb Dailey

## 2020-08-11 NOTE — TELEPHONE ENCOUNTER
----- Message from Satish Capellan DO sent at 3/7/3987  2:28 PM EDT -----  Needs appointment with me or Marcos Clark in the next 4-6 weeks to go over type 2 diabetes

## 2020-08-13 ENCOUNTER — OFFICE VISIT (OUTPATIENT)
Dept: FAMILY MEDICINE CLINIC | Facility: CLINIC | Age: 40
End: 2020-08-13
Payer: COMMERCIAL

## 2020-08-13 VITALS
DIASTOLIC BLOOD PRESSURE: 88 MMHG | TEMPERATURE: 98 F | BODY MASS INDEX: 45.46 KG/M2 | OXYGEN SATURATION: 96 % | RESPIRATION RATE: 17 BRPM | SYSTOLIC BLOOD PRESSURE: 140 MMHG | WEIGHT: 240.8 LBS | HEART RATE: 81 BPM | HEIGHT: 61 IN

## 2020-08-13 DIAGNOSIS — F41.9 ANXIETY: ICD-10-CM

## 2020-08-13 DIAGNOSIS — E66.01 MORBID OBESITY (HCC): ICD-10-CM

## 2020-08-13 DIAGNOSIS — E11.9 TYPE 2 DIABETES MELLITUS WITHOUT COMPLICATION, WITHOUT LONG-TERM CURRENT USE OF INSULIN (HCC): Primary | ICD-10-CM

## 2020-08-13 DIAGNOSIS — H65.23 BILATERAL CHRONIC SEROUS OTITIS MEDIA: ICD-10-CM

## 2020-08-13 PROCEDURE — 2022F DILAT RTA XM EVC RTNOPTHY: CPT | Performed by: NURSE PRACTITIONER

## 2020-08-13 PROCEDURE — 3725F SCREEN DEPRESSION PERFORMED: CPT | Performed by: NURSE PRACTITIONER

## 2020-08-13 PROCEDURE — 99214 OFFICE O/P EST MOD 30 MIN: CPT | Performed by: NURSE PRACTITIONER

## 2020-08-13 PROCEDURE — 3079F DIAST BP 80-89 MM HG: CPT | Performed by: NURSE PRACTITIONER

## 2020-08-13 PROCEDURE — 3077F SYST BP >= 140 MM HG: CPT | Performed by: NURSE PRACTITIONER

## 2020-08-13 PROCEDURE — 1036F TOBACCO NON-USER: CPT | Performed by: NURSE PRACTITIONER

## 2020-08-13 PROCEDURE — 3008F BODY MASS INDEX DOCD: CPT | Performed by: NURSE PRACTITIONER

## 2020-08-13 RX ORDER — EMPAGLIFLOZIN 10 MG/1
10 TABLET, FILM COATED ORAL EVERY MORNING
Qty: 90 TABLET | Refills: 0 | Status: SHIPPED | OUTPATIENT
Start: 2020-08-13 | End: 2020-10-12

## 2020-08-13 RX ORDER — NORETHINDRONE ACETATE AND ETHINYL ESTRADIOL 1MG-20(21)
KIT ORAL
COMMUNITY

## 2020-08-13 RX ORDER — METFORMIN HYDROCHLORIDE 500 MG/1
500 TABLET, EXTENDED RELEASE ORAL 2 TIMES DAILY
Qty: 180 TABLET | Refills: 4 | Status: CANCELLED | OUTPATIENT
Start: 2020-08-13

## 2020-08-13 RX ORDER — HYDROXYZINE HYDROCHLORIDE 25 MG/1
25 TABLET, FILM COATED ORAL 2 TIMES DAILY PRN
Qty: 30 TABLET | Refills: 0 | Status: SHIPPED | OUTPATIENT
Start: 2020-08-13 | End: 2020-08-20

## 2020-08-13 NOTE — ASSESSMENT & PLAN NOTE
Start atarax as needed for anxiety, this is one pill twice a day as needed  This medication may cause drowsiness  Do not drive on this medication  Do not drink alcohol while taking this medication  Do not mix with other medications that may cause drowsiness  Discussed function of PRN meds versus daily medications  She will start with PRN  She will call if anything changes or if she wishes to start something daily  Please call the office if you are experiencing any worsening of symptoms or no symptom improvement  Handout provided

## 2020-08-13 NOTE — ASSESSMENT & PLAN NOTE
Lab Results   Component Value Date    HGBA1C 7 9 (H) 08/05/2020   Foot Exam done today  Patient has not toelrated metformin for years and due to side effects has had very poor compliance  Will change to other mono therapy option per AACE guidelines- will start SGLT Jardiance 10 mg daily  Monitor sugars QID, will call with sugars in 1 month  Will titrate dose appropriately  F/u 3 months for in office visit and A1C  Due for Eye exam  Please call the office if you are experiencing any worsening of symptoms or no symptom improvement  Handout on medication given to patient

## 2020-08-13 NOTE — PROGRESS NOTES
Assessment/Plan:    Type 2 diabetes mellitus without complication, without long-term current use of insulin (HCC)    Lab Results   Component Value Date    HGBA1C 7 9 (H) 08/05/2020   Foot Exam done today  Patient has not toelrated metformin for years and due to side effects has had very poor compliance  Will change to other mono therapy option per AACE guidelines- will start SGLT Jardiance 10 mg daily  Monitor sugars QID, will call with sugars in 1 month  Will titrate dose appropriately  F/u 3 months for in office visit and A1C  Due for Eye exam  Please call the office if you are experiencing any worsening of symptoms or no symptom improvement  Handout on medication given to patient  Morbid obesity ([facility] [address] )  Discussed weight loss/ exercise    Anxiety  Start atarax as needed for anxiety, this is one pill twice a day as needed  This medication may cause drowsiness  Do not drive on this medication  Do not drink alcohol while taking this medication  Do not mix with other medications that may cause drowsiness  Discussed function of PRN meds versus daily medications  She will start with PRN  She will call if anything changes or if she wishes to start something daily  Please call the office if you are experiencing any worsening of symptoms or no symptom improvement  Handout provided  Bilateral chronic serous otitis media  Advised to F/U with ENT- provider info given      Patient verbalizes understand and agrees with treatment plan  Diagnoses and all orders for this visit:    Type 2 diabetes mellitus without complication, without long-term current use of insulin (HCC)  -     Empagliflozin (Jardiance) 10 MG TABS; Take 1 tablet (10 mg total) by mouth every morning  -     Glucometer test strips  -     Glucometer  -     Lancets    Anxiety  -     hydrOXYzine HCL (ATARAX) 25 mg tablet;  Take 1 tablet (25 mg total) by mouth 2 (two) times a day as needed for anxiety    Morbid obesity (HCC)    Bilateral chronic serous otitis media    Other orders  -     Cancel: metFORMIN (GLUCOPHAGE-XR) 500 mg 24 hr tablet; Take 1 tablet (500 mg total) by mouth 2 (two) times a day  -     norethindrone-ethinyl estradiol (Junel FE 1/20) 1-20 MG-MCG per tablet; Junel FE 1/20 (28) 1 mg-20 mcg (21)/75 mg (7) tablet   TAKE 1 TABLET BY MOUTH EVERY DAY        Subjective:      Patient ID: Ji Patel is a 38 y o  female  Chief Complaint   Patient presents with    Follow-up     Pt is here to review labs       Here for blood work follow ups  Has been off of the metformin for 2 months  Over past 6 years has been on metformin but with stomach pains and diarrhea  And this is even on the ER tablets  She states she has a very hard time tolerating them  She initially had gestational diabetes which then transformed to type 2 diabetes  She was on insulin in the past with pregnancies  She reports terrible diet and exercise  She has been increasing her vegetable intake  She is having a lot of life stressors and her health has came last amongst this  She is currently dealing with behavioral issues with her son  She has high anxiety at home  She would like to have something to take as needed at this point for anxiety  The following portions of the patient's history were reviewed and updated as appropriate: allergies, current medications, past family history, past social history and problem list     Review of Systems   Constitutional: Negative for chills and fever  Eyes: Negative for discharge  Respiratory: Negative for shortness of breath  Cardiovascular: Negative for chest pain  Gastrointestinal: Negative for constipation and diarrhea  Genitourinary: Negative for difficulty urinating  Musculoskeletal: Negative for joint swelling  Skin: Negative for rash  Neurological: Negative for headaches  Hematological: Negative for adenopathy  Psychiatric/Behavioral: The patient is nervous/anxious          Objective:  /88 (BP Location: Left arm, Patient Position: Sitting, Cuff Size: Large)   Pulse 81   Temp 98 °F (36 7 °C) (Temporal)   Resp 17   Ht 5' 1" (1 549 m)   Wt 109 kg (240 lb 12 8 oz)   SpO2 96%   BMI 45 50 kg/m²      Physical Exam  Vitals signs and nursing note reviewed  Constitutional:       General: She is not in acute distress  Appearance: She is well-developed  She is obese  She is not ill-appearing, toxic-appearing or diaphoretic  HENT:      Head: Normocephalic and atraumatic  Right Ear: External ear normal  A middle ear effusion is present  Tympanic membrane is not scarred, perforated, erythematous or retracted  Left Ear: External ear normal  A middle ear effusion is present  Tympanic membrane is not scarred, perforated, erythematous or retracted  Eyes:      General: Lids are normal          Right eye: No discharge  Left eye: No discharge  Conjunctiva/sclera: Conjunctivae normal    Neck:      Musculoskeletal: Neck supple  Cardiovascular:      Rate and Rhythm: Normal rate and regular rhythm  Pulses: no weak pulses          Dorsalis pedis pulses are 2+ on the right side and 2+ on the left side  Heart sounds: No murmur  Pulmonary:      Effort: Pulmonary effort is normal  No respiratory distress  Breath sounds: Normal breath sounds  No wheezing  Musculoskeletal:         General: No deformity  Feet:      Right foot:      Skin integrity: No ulcer, skin breakdown, erythema, warmth, callus or dry skin  Left foot:      Skin integrity: No ulcer, skin breakdown, erythema, warmth, callus or dry skin  Skin:     General: Skin is warm and dry  Neurological:      Mental Status: She is alert and oriented to person, place, and time  Psychiatric:         Attention and Perception: Attention and perception normal          Mood and Affect: Mood is anxious  Affect is tearful  Speech: Speech normal          Behavior: Behavior normal          Thought Content:  Thought content normal  Thought content is not delusional          Cognition and Memory: Cognition normal          Judgment: Judgment normal                 Current Outpatient Medications:     Blood Glucose Monitoring Suppl (ONE TOUCH ULTRA MINI) w/Device KIT, USE AS DIRECTED DX: E11 8, Disp: , Rfl: 0    glucose blood test strip, Use as instructed, Disp: 100 each, Rfl: 0    Lancets (ONETOUCH ULTRASOFT) lancets, Use as instructed, Disp: 100 each, Rfl: 0    levothyroxine 150 mcg tablet, TAKE 1 TABLET (150 MCG TOTAL) BY MOUTH DAILY IN THE EARLY MORNING, Disp: 90 tablet, Rfl: 2    metFORMIN (GLUCOPHAGE-XR) 500 mg 24 hr tablet, TAKE 1 TABLET TWICE A DAY, Disp: 180 tablet, Rfl: 4    Multiple Vitamins-Minerals (MULTIVITAMIN ADULT) CHEW, Chew, Disp: , Rfl:     telmisartan (MICARDIS) 40 mg tablet, Take 1 tablet (40 mg total) by mouth daily, Disp: 90 tablet, Rfl: 3    Vitamin D, Cholecalciferol, 1000 UNITS CAPS, Take by mouth , Disp: , Rfl:     Empagliflozin (Jardiance) 10 MG TABS, Take 1 tablet (10 mg total) by mouth every morning, Disp: 90 tablet, Rfl: 0    fluticasone (FLONASE) 50 mcg/act nasal spray, 2 sprays into each nostril daily (Patient not taking: Reported on 8/13/2020), Disp: 16 g, Rfl: 1    hydrOXYzine HCL (ATARAX) 25 mg tablet, Take 1 tablet (25 mg total) by mouth 2 (two) times a day as needed for anxiety, Disp: 30 tablet, Rfl: 0    norethindrone-ethinyl estradiol (Junel FE 1/20) 1-20 MG-MCG per tablet, Junel FE 1/20 (28) 1 mg-20 mcg (21)/75 mg (7) tablet  TAKE 1 TABLET BY MOUTH EVERY DAY, Disp: , Rfl:   Allergies   Allergen Reactions    Albuterol      SYNCOPE    Cat Hair Extract     Dog Epithelium     Other      ADHESIVE/WELTS    Pollen Extract        Patient's shoes and socks removed  Right Foot/Ankle   Right Foot Inspection  Skin Exam: skin normal and skin intact no dry skin, no warmth, no callus, no erythema, no maceration, no abnormal color, no pre-ulcer, no ulcer and no callus                          Toe Exam: ROM and strength within normal limitsno tenderness, erythema and  no right toe deformity  Sensory     Proprioception: intact   Monofilament testing: intact  Vascular  Capillary refills: < 3 seconds  The right DP pulse is 2+  Right Toe  - Comprehensive Exam  Ecchymosis: none  Arch: normal  Hammertoes: absent  Claw Toes: absent  Swelling: none         Left Foot/Ankle  Left Foot Inspection  Skin Exam: skin normal and skin intactno dry skin, no warmth, no erythema, no maceration, normal color, no pre-ulcer, no ulcer and no callus                         Toe Exam: ROM and strength within normal limitsno swelling, no tenderness, no erythema and no left toe deformity                   Sensory     Proprioception: intact  Monofilament: intact  Vascular  Capillary refills: < 3 seconds  The left DP pulse is 2+  Left Toe  - Comprehensive Exam  Ecchymosis: none  Arch: normal  Hammertoes: absent  Claw toes: absent  Swelling: none   Tenderness: none       Assign Risk Category:  No deformity present; No loss of protective sensation;  No weak pulses       Risk: 0

## 2020-08-13 NOTE — PATIENT INSTRUCTIONS
Start Jardiance: This is one pill daily  Monitor sugars four times a day- fasting in morning, before lunch, before dinner, before bed  Call/message with numbers in 1 month  We will adjust medication as needed at that time  Start atarax as needed for anxiety, this is one pill twice a day as needed  This medication may cause drowsiness  Do not drive on this medication  Do not drink alcohol while taking this medication  Do not mix with other medications that may cause drowsiness  See ENT for ear evaluation  Please call the office if you are experiencing any worsening of symptoms or no symptom improvement  Empagliflozin (By mouth)   Empagliflozin ([id number]-nw-scy-FLOE-zin)  Treats type 2 diabetes  Also lowers risk of death in patients with type 2 diabetes and heart or blood vessel problems  Brand Name(s): Jardiance   There may be other brand names for this medicine  When This Medicine Should Not Be Used: This medicine is not right for everyone  Do not use it if you had an allergic reaction to empagliflozin  How to Use This Medicine:   Tablet  · Take your medicine as directed  Your dose may need to be changed several times to find what works best for you  This medicine is usually taken in the morning  · Read and follow the patient instructions that come with this medicine  Talk to your doctor or pharmacist if you have any questions  · Missed dose: Take a dose as soon as you remember  If it is almost time for your next dose, wait until then and take a regular dose  Do not take extra medicine to make up for a missed dose  · Store the medicine in a closed container at room temperature, away from heat, moisture, and direct light  Drugs and Foods to Avoid:   Ask your doctor or pharmacist before using any other medicine, including over-the-counter medicines, vitamins, and herbal products  · Some medicines can affect how empagliflozin works   Tell your doctor if you are using any of the following:  ¨ Diuretic (water pill)  ¨ Insulin or another diabetes medicine  Warnings While Using This Medicine:   · Tell your doctor if you are pregnant or breastfeeding, or if you have kidney disease, liver problems, congestive heart failure, high cholesterol, or a history of pancreas problems or genital yeast or urinary tract infections  Tell your doctor if you are on a low-salt diet or if you drink alcohol  · This medicine may cause the following problems:  ¨ Low blood pressure  ¨ Ketoacidosis (high ketones and acid in the blood)  ¨ Low blood sugar  ¨ Kidney problems  ¨ Increased risk of genital yeast or urinary tract infections  · Tell any doctor or dentist who treats you that you are using this medicine  This medicine may affect certain medical test results  This medicine may affect the results of urine glucose tests  · Your doctor will do lab tests at regular visits to check on the effects of this medicine  Keep all appointments  · Keep all medicine out of the reach of children  Never share your medicine with anyone  Possible Side Effects While Using This Medicine:   Call your doctor right away if you notice any of these side effects:  · Allergic reaction: Itching or hives, swelling in your face or hands, swelling or tingling in your mouth or throat, chest tightness, trouble breathing  · Change in how much or how often you urinate, bloody or cloudy urine, painful or difficult urination, lower back or side pain  · Increased hunger, confusion, shaking, trembling, sweating  · Lightheadedness, dizziness, fainting  · Trouble breathing, tiredness, stomach pain, nausea, vomiting  If you notice these less serious side effects, talk with your doctor:   · Redness, itching, pain, or swelling of the penis, bad-smelling discharge from the penis  · White or yellow vaginal discharge, vaginal itching or odor  If you notice other side effects that you think are caused by this medicine, tell your doctor     Call your doctor for medical advice about side effects  You may report side effects to FDA at 2-121-FDA-1827  © 2017 [address] [employer]  Information is for End User's use only and may not be sold, redistributed or otherwise used for commercial purposes  The above information is an  only  It is not intended as medical advice for individual conditions or treatments  Talk to your doctor, nurse or pharmacist before following any medical regimen to see if it is safe and effective for you  Hydroxyzine (By mouth)   Hydroxyzine Pamoate (dgp-LEWW-y-zeen AKILA-oh-ate)  Treats anxiety, nausea, vomiting, allergies, skin rash, hives, and itching  May also be used with anesthesia for medical procedures  Brand Name(s): Vistaril   There may be other brand names for this medicine  When This Medicine Should Not Be Used: This medicine is not right for everyone  Do not use it if you had an allergic reaction to hydroxyzine, cetirizine, or levocetirizine  Do not use it during the early part of pregnancy or if you have prolonged QT interval   How to Use This Medicine:   Capsule, Liquid, Tablet  · Your doctor will tell you how much medicine to use  Do not use more than directed  · Oral liquid: Measure the oral liquid medicine with a marked measuring spoon, oral syringe, or medicine cup  Shake well just before use  · Tablet: Swallow whole  Do not break, crush, or chew it  · Missed dose: Take a dose as soon as you remember  If it is almost time for your next dose, wait until then and take a regular dose  Do not take extra medicine to make up for a missed dose  · Store the medicine in a closed container at room temperature, away from heat, moisture, and direct light  Drugs and Foods to Avoid:   Ask your doctor or pharmacist before using any other medicine, including over-the-counter medicines, vitamins, and herbal products  · Some medicines can affect how hydroxyzine works   Tell your doctor if you are using any of the following:  ¨ Droperidol, methadone, ondansetron, pentamidine  ¨ Antibiotic (including azithromycin, clarithromycin, erythromycin, gatifloxacin, moxifloxacin)  ¨ Medicine to treat depression (including citalopram, fluoxetine)  ¨ Medicine to treat heart rhythm problems (including amiodarone, procainamide, quinidine, sotalol)  ¨ Medicine to treat mental health problems (including chlorpromazine, clozapine, iloperidone, quetiapine, ziprasidone)  · Tell your doctor if you use anything else that makes you sleepy  Some examples are allergy medicine, narcotic pain medicine, and alcohol  Warnings While Using This Medicine:   · Tell your doctor if you are pregnant or breastfeeding, or if you have heart disease, heart failure, a slow heartbeat, skin problems, or had a recent heart attack  · This medicine may cause the following problems:  ¨ Changes in your heart rhythm  ¨ Serious skin reaction called acute generalized exanthematous pustulosis (AGEP)  · This medicine may make you drowsy  Do not drive or do anything that could be dangerous until you know how this medicine affects you  · Call your doctor if your symptoms do not improve or if they get worse  · Keep all medicine out of the reach of children  Never share your medicine with anyone  Possible Side Effects While Using This Medicine:   Call your doctor right away if you notice any of these side effects:  · Allergic reaction: Itching or hives, swelling in your face or hands, swelling or tingling in your mouth or throat, chest tightness, trouble breathing  · Fainting, dizziness, lightheadedness  · Fast, pounding, or uneven heartbeat  · Fever, skin rash  · Severe tiredness  If you notice these less serious side effects, talk with your doctor:   · Drowsiness, sleepiness  · Dry mouth  If you notice other side effects that you think are caused by this medicine, tell your doctor  Call your doctor for medical advice about side effects   You may report side effects to FDA at 9-526-FDA-1088  © 2017 [address] [employer] Information is for End User's use only and may not be sold, redistributed or otherwise used for commercial purposes  The above information is an  only  It is not intended as medical advice for individual conditions or treatments  Talk to your doctor, nurse or pharmacist before following any medical regimen to see if it is safe and effective for you  Eustachian Tube Dysfunction   WHAT YOU NEED TO KNOW:   What is eustachian tube dysfunction? Eustachian tube dysfunction (ETD) is a condition that prevents your eustachian tubes from opening properly  It can also cause them to become blocked  Eustachian tubes connect your middle ear to the back of your nose and throat  These tubes open and allow air to flow in and out when you sneeze, swallow, or yawn  What causes or increases my risk of ETD? ETD may be caused by swelling or buildup of mucus in your eustachian tubes  Allergies, a cold, or sinus infection can increase your risk for ETD  Smoking also increases your risk for ETD  What are the signs and symptoms of ETD? · Fullness or pressure in your ears    · Muffled hearing    · Pain in one or both ears    · Ringing in your ears    · Popping or clicking feeling in your ears    · Trouble keeping your balance  How is ETD diagnosed? Your healthcare provider will ask about your symptoms  He will examine your ears, your nose, and the back of your throat  He may also do a hearing test    How is ETD treated? Your ETD may get better on its own without any treatment  You may need any of the following:  · Exercises  such as swallowing, yawning, or chewing gum may help to open your eustachian tubes  Your healthcare provider may also recommend that you take a deep breath and then blow with your mouth shut and your nostrils pinched closed  · Air pressure devices  push air into your nose and eustachian tubes to help relieve air pressure in your ear  · Treatment for allergies  such as decongestants, antihistamines, and nasal steroids may improve ETD  They may help decrease swelling of the eustachian tubes  · Ear tubes  may help to keep your middle ear open  During this procedure, your healthcare provider will cut a small hole in your eardrum  · A myringotomy  is procedure to make a small cut in your eardrum and suction out fluid from your middle ear  · Tuboplasty  is a procedure to widen your eustachian tubes  When should I contact my healthcare provider? · Your symptoms do not improve or get worse  · You have a fever  · You have any hearing loss  · You have questions or concerns about your condition or care  CARE AGREEMENT:   You have the right to help plan your care  Learn about your health condition and how it may be treated  Discuss treatment options with your caregivers to decide what care you want to receive  You always have the right to refuse treatment  The above information is an  only  It is not intended as medical advice for individual conditions or treatments  Talk to your doctor, nurse or pharmacist before following any medical regimen to see if it is safe and effective for you  © 2017 [address] [facility] Information is for End User's use only and may not be sold, redistributed or otherwise used for commercial purposes  All illustrations and images included in CareNotes® are the copyrighted property of [employer]  or [employer]

## 2020-08-14 ENCOUNTER — TELEPHONE (OUTPATIENT)
Dept: FAMILY MEDICINE CLINIC | Facility: CLINIC | Age: 40
End: 2020-08-14

## 2020-08-14 NOTE — TELEPHONE ENCOUNTER
----- Message from Toni Hoff sent at 8/13/2020  4:49 PM EDT -----  Regarding: FW: Visit Follow-Up Question  Contact: [phone]    ----- Message -----  From: Bard Rosas  Sent: 8/13/2020   4:48 PM EDT  To: [facility]  Subject: Visit Follow-Up Question                         Vahid De La Vega, I got my jardiance, and atarax, but the pharmacy didn't have scripts or info for new diabetes test equipment  Can you resend to [facility] please?      Kimmie

## 2020-08-20 DIAGNOSIS — F41.9 ANXIETY: ICD-10-CM

## 2020-08-20 RX ORDER — HYDROXYZINE HYDROCHLORIDE 25 MG/1
25 TABLET, FILM COATED ORAL 2 TIMES DAILY PRN
Qty: 30 TABLET | Refills: 0 | Status: SHIPPED | OUTPATIENT
Start: 2020-08-20 | End: 2021-03-09 | Stop reason: ALTCHOICE

## 2020-08-30 DIAGNOSIS — E11.9 CONTROLLED TYPE 2 DIABETES MELLITUS WITHOUT COMPLICATION, WITHOUT LONG-TERM CURRENT USE OF INSULIN (HCC): ICD-10-CM

## 2020-08-31 DIAGNOSIS — E11.9 CONTROLLED TYPE 2 DIABETES MELLITUS WITHOUT COMPLICATION, WITHOUT LONG-TERM CURRENT USE OF INSULIN (HCC): ICD-10-CM

## 2020-08-31 RX ORDER — BLOOD SUGAR DIAGNOSTIC
STRIP MISCELLANEOUS
Qty: 100 EACH | Refills: 1 | Status: SHIPPED | OUTPATIENT
Start: 2020-08-31

## 2020-08-31 RX ORDER — BLOOD SUGAR DIAGNOSTIC
STRIP MISCELLANEOUS
Qty: 100 EACH | Refills: 0 | Status: SHIPPED | OUTPATIENT
Start: 2020-08-31 | End: 2020-08-31

## 2020-09-03 ENCOUNTER — TELEPHONE (OUTPATIENT)
Dept: FAMILY MEDICINE CLINIC | Facility: CLINIC | Age: 40
End: 2020-09-03

## 2020-09-03 DIAGNOSIS — F41.9 ANXIETY: Primary | ICD-10-CM

## 2020-09-03 RX ORDER — VENLAFAXINE HYDROCHLORIDE 37.5 MG/1
37.5 CAPSULE, EXTENDED RELEASE ORAL
Qty: 30 CAPSULE | Refills: 1 | Status: SHIPPED | OUTPATIENT
Start: 2020-09-03 | End: 2020-09-28

## 2020-09-03 NOTE — TELEPHONE ENCOUNTER
Taylor Simeon,      Sometimes people see fast results with Jardiance in 1-2 weeks but others it takes longer  So I think it's okay to continue with the current dose and see how it progresses over the next 2-3 weeks  I'm glad you have the coupon and it's affordable now  It will be something we can play by ear  If we have to change next year we can  Sometimes we have samples as well       In regards to the anxiety  We can absolutely start something daily  Would you prefer something just for anxiety or something that covered both anxiety and depression?  Also please tell me what medications you've been on before if any      Thanks,    Candelaria Romano

## 2020-09-03 NOTE — TELEPHONE ENCOUNTER
----- Message from Jesi Colvin sent at 9/3/2020 10:57 AM EDT -----  Regarding: RE: Visit Follow-Up Question  Contact: [phone]  Yony Beckwith! Years ago, I took paxil for SAD  and otherwise I've only had the atarax  Sometimes it seems almost impossible to get started, if that makes sense, and from what I read thats depression? If we can do something low dose for both, I think it might help       Thank you

## 2020-09-26 DIAGNOSIS — F41.9 ANXIETY: ICD-10-CM

## 2020-09-28 RX ORDER — VENLAFAXINE HYDROCHLORIDE 37.5 MG/1
37.5 CAPSULE, EXTENDED RELEASE ORAL
Qty: 30 CAPSULE | Refills: 1 | Status: SHIPPED | OUTPATIENT
Start: 2020-09-28 | End: 2020-10-23

## 2020-10-02 DIAGNOSIS — B37.3 VULVOVAGINAL CANDIDIASIS: Primary | ICD-10-CM

## 2020-10-02 RX ORDER — FLUCONAZOLE 150 MG/1
150 TABLET ORAL ONCE
Qty: 1 TABLET | Refills: 1 | Status: SHIPPED | OUTPATIENT
Start: 2020-10-02 | End: 2020-10-02

## 2020-10-12 DIAGNOSIS — E11.9 TYPE 2 DIABETES MELLITUS WITHOUT COMPLICATION, WITHOUT LONG-TERM CURRENT USE OF INSULIN (HCC): ICD-10-CM

## 2020-10-12 RX ORDER — METFORMIN HYDROCHLORIDE 500 MG/1
500 TABLET, EXTENDED RELEASE ORAL 2 TIMES DAILY
Qty: 180 TABLET | Refills: 0 | Status: SHIPPED | OUTPATIENT
Start: 2020-10-12 | End: 2021-01-19

## 2020-10-23 DIAGNOSIS — F41.9 ANXIETY: ICD-10-CM

## 2020-10-23 RX ORDER — VENLAFAXINE HYDROCHLORIDE 37.5 MG/1
37.5 CAPSULE, EXTENDED RELEASE ORAL
Qty: 90 CAPSULE | Refills: 1 | Status: SHIPPED | OUTPATIENT
Start: 2020-10-23 | End: 2021-02-08 | Stop reason: SDUPTHER

## 2020-10-26 DIAGNOSIS — I10 ESSENTIAL HYPERTENSION: ICD-10-CM

## 2020-10-26 PROCEDURE — 4010F ACE/ARB THERAPY RXD/TAKEN: CPT | Performed by: FAMILY MEDICINE

## 2020-10-26 RX ORDER — TELMISARTAN 40 MG/1
40 TABLET ORAL DAILY
Qty: 90 TABLET | Refills: 0 | Status: SHIPPED | OUTPATIENT
Start: 2020-10-26 | End: 2021-01-24

## 2020-11-13 ENCOUNTER — OFFICE VISIT (OUTPATIENT)
Dept: FAMILY MEDICINE CLINIC | Facility: CLINIC | Age: 40
End: 2020-11-13
Payer: COMMERCIAL

## 2020-11-13 VITALS
HEIGHT: 61 IN | OXYGEN SATURATION: 98 % | TEMPERATURE: 96.9 F | WEIGHT: 236.2 LBS | SYSTOLIC BLOOD PRESSURE: 122 MMHG | BODY MASS INDEX: 44.6 KG/M2 | HEART RATE: 64 BPM | DIASTOLIC BLOOD PRESSURE: 78 MMHG

## 2020-11-13 DIAGNOSIS — I10 ESSENTIAL HYPERTENSION: ICD-10-CM

## 2020-11-13 DIAGNOSIS — E78.2 MIXED HYPERLIPIDEMIA: ICD-10-CM

## 2020-11-13 DIAGNOSIS — E11.9 TYPE 2 DIABETES MELLITUS WITHOUT COMPLICATION, WITHOUT LONG-TERM CURRENT USE OF INSULIN (HCC): Primary | ICD-10-CM

## 2020-11-13 DIAGNOSIS — E03.8 HYPOTHYROIDISM DUE TO HASHIMOTO'S THYROIDITIS: ICD-10-CM

## 2020-11-13 DIAGNOSIS — E66.01 MORBID OBESITY WITH BMI OF 40.0-44.9, ADULT (HCC): ICD-10-CM

## 2020-11-13 DIAGNOSIS — E06.3 HYPOTHYROIDISM DUE TO HASHIMOTO'S THYROIDITIS: ICD-10-CM

## 2020-11-13 PROCEDURE — 3008F BODY MASS INDEX DOCD: CPT | Performed by: FAMILY MEDICINE

## 2020-11-13 PROCEDURE — 3078F DIAST BP <80 MM HG: CPT | Performed by: FAMILY MEDICINE

## 2020-11-13 PROCEDURE — 3074F SYST BP LT 130 MM HG: CPT | Performed by: FAMILY MEDICINE

## 2020-11-13 PROCEDURE — 1036F TOBACCO NON-USER: CPT | Performed by: FAMILY MEDICINE

## 2020-11-13 PROCEDURE — 99214 OFFICE O/P EST MOD 30 MIN: CPT | Performed by: FAMILY MEDICINE

## 2020-12-15 DIAGNOSIS — E03.8 HYPOTHYROIDISM DUE TO HASHIMOTO'S THYROIDITIS: ICD-10-CM

## 2020-12-15 DIAGNOSIS — E06.3 HYPOTHYROIDISM DUE TO HASHIMOTO'S THYROIDITIS: ICD-10-CM

## 2020-12-16 RX ORDER — LEVOTHYROXINE SODIUM 0.15 MG/1
150 TABLET ORAL
Qty: 90 TABLET | Refills: 2 | Status: SHIPPED | OUTPATIENT
Start: 2020-12-16 | End: 2021-10-14

## 2021-01-06 ENCOUNTER — VBI (OUTPATIENT)
Dept: ADMINISTRATIVE | Facility: OTHER | Age: 41
End: 2021-01-06

## 2021-01-17 DIAGNOSIS — E11.9 TYPE 2 DIABETES MELLITUS WITHOUT COMPLICATION, WITHOUT LONG-TERM CURRENT USE OF INSULIN (HCC): ICD-10-CM

## 2021-01-19 RX ORDER — METFORMIN HYDROCHLORIDE 500 MG/1
TABLET, EXTENDED RELEASE ORAL
Qty: 180 TABLET | Refills: 0 | Status: SHIPPED | OUTPATIENT
Start: 2021-01-19 | End: 2021-04-29 | Stop reason: SDUPTHER

## 2021-01-24 DIAGNOSIS — I10 ESSENTIAL HYPERTENSION: ICD-10-CM

## 2021-01-24 RX ORDER — TELMISARTAN 40 MG/1
TABLET ORAL
Qty: 90 TABLET | Refills: 0 | Status: SHIPPED | OUTPATIENT
Start: 2021-01-24 | End: 2021-02-08 | Stop reason: SDUPTHER

## 2021-02-08 DIAGNOSIS — F41.9 ANXIETY: ICD-10-CM

## 2021-02-08 DIAGNOSIS — I10 ESSENTIAL HYPERTENSION: ICD-10-CM

## 2021-02-09 RX ORDER — VENLAFAXINE HYDROCHLORIDE 37.5 MG/1
37.5 CAPSULE, EXTENDED RELEASE ORAL
Qty: 90 CAPSULE | Refills: 0 | Status: SHIPPED | OUTPATIENT
Start: 2021-02-09 | End: 2021-06-17 | Stop reason: SDUPTHER

## 2021-02-09 RX ORDER — TELMISARTAN 40 MG/1
40 TABLET ORAL DAILY
Qty: 90 TABLET | Refills: 0 | Status: SHIPPED | OUTPATIENT
Start: 2021-02-09 | End: 2021-03-09 | Stop reason: ALTCHOICE

## 2021-02-09 RX ORDER — VENLAFAXINE HYDROCHLORIDE 37.5 MG/1
CAPSULE, EXTENDED RELEASE ORAL
Qty: 90 CAPSULE | Refills: 1 | OUTPATIENT
Start: 2021-02-09

## 2021-02-24 ENCOUNTER — TRANSCRIBE ORDERS (OUTPATIENT)
Dept: ADMINISTRATIVE | Facility: HOSPITAL | Age: 41
End: 2021-02-24

## 2021-02-24 DIAGNOSIS — R92.8 OTHER ABNORMAL AND INCONCLUSIVE FINDINGS ON DIAGNOSTIC IMAGING OF BREAST: Primary | ICD-10-CM

## 2021-03-18 ENCOUNTER — HOSPITAL ENCOUNTER (OUTPATIENT)
Dept: MAMMOGRAPHY | Facility: CLINIC | Age: 41
Discharge: HOME | End: 2021-03-18
Payer: COMMERCIAL

## 2021-03-18 VITALS — BODY MASS INDEX: 44.56 KG/M2 | WEIGHT: 236 LBS | HEIGHT: 61 IN

## 2021-03-18 DIAGNOSIS — R92.8 OTHER ABNORMAL AND INCONCLUSIVE FINDINGS ON DIAGNOSTIC IMAGING OF BREAST: ICD-10-CM

## 2021-03-18 PROCEDURE — 76642 ULTRASOUND BREAST LIMITED: CPT

## 2021-03-18 PROCEDURE — 77066 DX MAMMO INCL CAD BI: CPT

## 2021-03-18 PROCEDURE — G0279 TOMOSYNTHESIS, MAMMO: HCPCS

## 2021-03-23 ENCOUNTER — EVALUATION (OUTPATIENT)
Dept: PHYSICAL THERAPY | Facility: REHABILITATION | Age: 41
End: 2021-03-23
Payer: COMMERCIAL

## 2021-03-23 DIAGNOSIS — H81.10 BENIGN PAROXYSMAL POSITIONAL VERTIGO, UNSPECIFIED LATERALITY: Primary | ICD-10-CM

## 2021-03-23 PROCEDURE — 97112 NEUROMUSCULAR REEDUCATION: CPT | Performed by: PHYSICAL THERAPIST

## 2021-03-23 PROCEDURE — 97162 PT EVAL MOD COMPLEX 30 MIN: CPT | Performed by: PHYSICAL THERAPIST

## 2021-03-23 NOTE — PROGRESS NOTES
PT Evaluation     Addendum 21: Amy Guzman, spoke by phone, she gets dizzy with turning head to left or tilting to left, occurs either in sitting/standing/supine, and disappears as soon as coming back to midline  It will also subside if holding the position to the side  Becomes imbalanced when this occurs, but returns to normally otherwise  Affects things that involve quick movement  Also occurs when defecating  No symptoms with valsalva maneuver  To distinguish head movement from "head on body" movement, patient sat in a swivel chair at home, and while turning her body to the right, kept her head still  This reproduced dizziness  She also reported gray spots  Both abated with moving her head back to midline  [place] reports villegas spots has occurred with just sitting still sometimes  Denies diplopia, paresthesias, nausea/vomiting, slurred speech, trouble swallowing, or generalized weakness  Differential diagnoses would include BPPV, vestibular hypofunction, cervicogenic dizziness, and vertebrobasilar insufficiency  As we were not able to provoke nystagmus with positional testing in room light or video goggles, and symptoms are provoked with head-on-body movement irrespective of gravity, this suggests against positional vertigo  Further, symptoms were provoked in the absence of head movement, suggesting against vestibular etiology  Usually cervicogenic dizziness and vertebrobasilar insufficiency continue as long as the patient maintains a provocative neck position  Besides dizziness, we do not see other symptoms of VBI except for transient gray spots in vision  She follows up with her primary care physician 21  Will notify primary care physician of these findings         Name: Jamaal Erickson  Date: 21  : 1980  Referring Provider: Gerardo Azevedo MD  AUTHORIZATION:   Insurance: Payor: Rima Abarca / Plan: Rima Abarca PPO / Product Type: PPO /     SUBJECTIVE:  HPI: Aubrie Caceres Leroy Marinelli is a 38 y o  female referred to outpatient physical therapy for the following diagnosis   Encounter Diagnosis   Name Primary?  Benign paroxysmal positional vertigo, unspecified laterality           Patient reports dizziness for about 6 months, thought it may just be congestion, then had allergy medications  Seen by ENT (Dr Amy Levin), felt she would benefit from vestibular therapy  Feels worn out by dizziness, feels life is unachievable at times due to dizziness  Only occurs on the left side  Sometimes has tinnitus, sometimes a pulsing or squealing in the ear  Suggested by ENT to increase allergy medication, saw congestion on the left side  About 10 years ago, had severe dizziness ([id number]), was mostly work stress that gave her severe panic attacks  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[employer])  Has two young children  Symptoms provoked by yawning, turning quickly, laying down  Sometimes with bending over  Describes spinning and head feel like a balloon  Sometimes is helped by ear popping  Lasts not too long, sometimes a couple minutes  Sometimes has to rest from this  Had hearing test, good hearing  Denies any hearing loss  No change in vision  Normal migraines, much less since having son  From migraines, gets light sensitivity, nausea, stabbing pain  Takes effexor for anxiety  Patient goals: help make dizzy spells go away       Past Medical History:   Diagnosis Date    Abnormal blood chemistry     Antepartum diabetes mellitus     Cough     Disease of thyroid gland     HYPOTHYROID    Elevated blood pressure reading     Gastritis     Generalized anxiety disorder     History of asthma     History of diabetes mellitus     History of dysfunctional uterine bleeding     History of ear pain     left    History of edema     History of gestational diabetes     History of hyperlipidemia     History of hypothyroidism     History of polycystic ovarian syndrome     History of sore throat     History of upper respiratory infection     Migraines     Morbidly obese (HCC)     Nonspecific abnormal finding     Polycystic ovary     Postsurgical dumping syndrome     Seasonal allergies     Sleep apnea     Suspected fetal anomaly not found        Current Outpatient Medications:     Blood Glucose Monitoring Suppl (ONE TOUCH ULTRA MINI) w/Device KIT, USE AS DIRECTED DX: E11 8, Disp: , Rfl: 0    glucose blood (Accu-Chek Samaria Plus) test strip, Test 4 times per day, Disp: 100 each, Rfl: 1    Lancets (ONETOUCH ULTRASOFT) lancets, Use as instructed, Disp: 100 each, Rfl: 0    levothyroxine 150 mcg tablet, TAKE 1 TABLET (150 MCG TOTAL) BY MOUTH DAILY IN THE EARLY MORNING, Disp: 90 tablet, Rfl: 2    metFORMIN (GLUCOPHAGE-XR) 500 mg 24 hr tablet, TAKE 1 TABLET BY MOUTH TWICE A DAY, Disp: 180 tablet, Rfl: 0    Multiple Vitamins-Minerals (MULTIVITAMIN ADULT) CHEW, Chew, Disp: , Rfl:     norethindrone-ethinyl estradiol (Junel FE 1/20) 1-20 MG-MCG per tablet, Junel FE 1/20 (28) 1 mg-20 mcg (21)/75 mg (7) tablet  TAKE 1 TABLET BY MOUTH EVERY DAY, Disp: , Rfl:     venlafaxine (EFFEXOR-XR) 37 5 mg 24 hr capsule, Take 1 capsule (37 5 mg total) by mouth daily with breakfast, Disp: 90 capsule, Rfl: 0    Vitamin D, Cholecalciferol, 1000 UNITS CAPS, Take by mouth , Disp: , Rfl:       OBJECTIVE:  Oculomotor Function    Resting Nystagmus  Normal   Gaze Holding Nystagmus  Normal   Smooth pursuit  Normal   Cross-Cover Testing  Normal       Coordination Testing Normal / Abnormal Notes   Fingertip to Nose Normal    Rapidly Alternating Hand Movements Normal    Pronator Drift Normal       Hasn't noticed balance has been affected  Static Balance    Romberg Normal   Sharpened Romberg Normal     Balance & Mobility Measures 3/23/21   Assistive device used?  None   Walking speed Mildly slowed   4 Item Dynamic Gait Index 11/12 (gait speed)   Patient-Reported Outcome Measure: Dizziness Handicap Inventory [facility]) Not tested     Positional Vertigo Tests: Tested in room light and then with infrared video goggles  Right Left   Neck range of motion normal    Lloyd-Hallpike negative negative   Roll Test negative negative   Nystagmus: No     /87, heart rate 74 bpm    Dynamic Visual Acuity (uses ETDRS chart, head movements 2Hz):    Static Head 20/20   Dynamic Head 20/40   Difference (abnormal if 3 or greater) 3      Head thrust and Head shaking tests    Head thrust normal        ASSESSMENT:  Fariba Almazan is a 38year old female with episodic vertigo  Based on her history, given short duration of vertigo and provocation by changing head positions, positional vertigo seemed probable, but we could not provoke nystagmus nor consistent symptoms with positional vertigo testing  We went through repositioning maneuvers for the left posterior canal (left Epley maneuver, twice)  No difficulty with balance or gaze stabilization, suggesting against peripheral hypofunction  She does have history of dizziness from panic attacks, but that was very situational and presentation is much different from current presentation  Lack of hearing loss suggests against Meneire's  In any case, discussed importance of continuing with current activity level and not getting into pattern of avoidance of movement  Patient agreed to note any instance of vertigo and discuss with therapist in a week  Further referral needed:     SHORT-TERM GOALS: 1 month(s)  1  Patient returns to all prior activities  2  Patient continues to demonstrate normal positional tests  LONG-TERM GOALS: 2 month(s)  1  Patient denies dizzy spells       Precautions   Exercise Diary  3/23/2021     Home exercise program      Endurance      Static and dynamic balance      Repositioning maneuvers                    PLAN OF CARE:  Patient will benefit from physical therapy as needed, 1-2 times per week for up to 2 months  Neuromuscular re-education, therapeutic exercises, and therapeutic activities as outlined in naomi Salvador, PT  3/23/2021

## 2021-04-09 ENCOUNTER — HOSPITAL ENCOUNTER (OUTPATIENT)
Dept: ULTRASOUND IMAGING | Facility: HOSPITAL | Age: 41
Discharge: HOME | End: 2021-04-09
Attending: SPECIALIST
Payer: COMMERCIAL

## 2021-04-09 DIAGNOSIS — K11.8 MASS OF LEFT PAROTID GLAND: ICD-10-CM

## 2021-04-09 PROCEDURE — 76536 US EXAM OF HEAD AND NECK: CPT

## 2021-04-16 ENCOUNTER — OFFICE VISIT (OUTPATIENT)
Dept: FAMILY MEDICINE CLINIC | Facility: CLINIC | Age: 41
End: 2021-04-16
Payer: COMMERCIAL

## 2021-04-16 ENCOUNTER — HOSPITAL ENCOUNTER (EMERGENCY)
Facility: HOSPITAL | Age: 41
Discharge: HOME | End: 2021-04-16
Attending: EMERGENCY MEDICINE
Payer: COMMERCIAL

## 2021-04-16 ENCOUNTER — APPOINTMENT (EMERGENCY)
Dept: RADIOLOGY | Facility: HOSPITAL | Age: 41
End: 2021-04-16
Payer: COMMERCIAL

## 2021-04-16 ENCOUNTER — APPOINTMENT (EMERGENCY)
Dept: CT IMAGING | Facility: HOSPITAL | Age: 41
End: 2021-04-16
Payer: COMMERCIAL

## 2021-04-16 VITALS
BODY MASS INDEX: 45.73 KG/M2 | HEART RATE: 78 BPM | SYSTOLIC BLOOD PRESSURE: 138 MMHG | HEIGHT: 61 IN | DIASTOLIC BLOOD PRESSURE: 86 MMHG | OXYGEN SATURATION: 97 % | RESPIRATION RATE: 17 BRPM | TEMPERATURE: 96.3 F | WEIGHT: 242.2 LBS

## 2021-04-16 VITALS
DIASTOLIC BLOOD PRESSURE: 74 MMHG | TEMPERATURE: 98.8 F | BODY MASS INDEX: 45.82 KG/M2 | RESPIRATION RATE: 18 BRPM | SYSTOLIC BLOOD PRESSURE: 138 MMHG | HEART RATE: 77 BPM | OXYGEN SATURATION: 98 % | WEIGHT: 242.51 LBS

## 2021-04-16 DIAGNOSIS — K76.0 HEPATIC STEATOSIS: ICD-10-CM

## 2021-04-16 DIAGNOSIS — R10.31 ABDOMINAL PAIN, RLQ: Primary | ICD-10-CM

## 2021-04-16 DIAGNOSIS — D72.829 LEUKOCYTOSIS: ICD-10-CM

## 2021-04-16 DIAGNOSIS — R55 NEAR SYNCOPE: ICD-10-CM

## 2021-04-16 DIAGNOSIS — R68.83 CHILLS: ICD-10-CM

## 2021-04-16 DIAGNOSIS — R42 DIZZINESS: ICD-10-CM

## 2021-04-16 DIAGNOSIS — R11.2 NON-INTRACTABLE VOMITING WITH NAUSEA, UNSPECIFIED VOMITING TYPE: ICD-10-CM

## 2021-04-16 DIAGNOSIS — R16.2 HEPATOSPLENOMEGALY: ICD-10-CM

## 2021-04-16 DIAGNOSIS — R10.84 GENERALIZED ABDOMINAL PAIN: Primary | ICD-10-CM

## 2021-04-16 DIAGNOSIS — E11.9 TYPE 2 DIABETES MELLITUS WITHOUT COMPLICATION, WITHOUT LONG-TERM CURRENT USE OF INSULIN (HCC): ICD-10-CM

## 2021-04-16 LAB
ALBUMIN SERPL BCP-MCNC: 3.6 G/DL (ref 3.5–5)
ALP SERPL-CCNC: 97 U/L (ref 46–116)
ALT SERPL W P-5'-P-CCNC: 26 U/L (ref 12–78)
ANION GAP SERPL CALCULATED.3IONS-SCNC: 7 MMOL/L (ref 4–13)
AST SERPL W P-5'-P-CCNC: 19 U/L (ref 5–45)
ATRIAL RATE: 68 BPM
BACTERIA UR QL AUTO: ABNORMAL /HPF
BASOPHILS # BLD AUTO: 0.03 THOUSANDS/ΜL (ref 0–0.1)
BASOPHILS NFR BLD AUTO: 0 % (ref 0–1)
BILIRUB DIRECT SERPL-MCNC: 0.13 MG/DL (ref 0–0.2)
BILIRUB SERPL-MCNC: 0.33 MG/DL (ref 0.2–1)
BILIRUB UR QL STRIP: NEGATIVE
BUN SERPL-MCNC: 13 MG/DL (ref 5–25)
CALCIUM SERPL-MCNC: 9 MG/DL (ref 8.3–10.1)
CHLORIDE SERPL-SCNC: 101 MMOL/L (ref 100–108)
CLARITY UR: CLEAR
CLARITY, POC: CLEAR
CO2 SERPL-SCNC: 29 MMOL/L (ref 21–32)
COLOR UR: YELLOW
COLOR, POC: YELLOW
CREAT SERPL-MCNC: 0.81 MG/DL (ref 0.6–1.3)
EOSINOPHIL # BLD AUTO: 0.14 THOUSAND/ΜL (ref 0–0.61)
EOSINOPHIL NFR BLD AUTO: 1 % (ref 0–6)
ERYTHROCYTE [DISTWIDTH] IN BLOOD BY AUTOMATED COUNT: 13.9 % (ref 11.6–15.1)
EXT PREG TEST URINE: NEGATIVE
EXT. CONTROL ED NAV: NORMAL
GFR SERPL CREATININE-BSD FRML MDRD: 90 ML/MIN/1.73SQ M
GLUCOSE SERPL-MCNC: 144 MG/DL (ref 65–140)
GLUCOSE UR STRIP-MCNC: NEGATIVE MG/DL
HCT VFR BLD AUTO: 37.7 % (ref 34.8–46.1)
HGB BLD-MCNC: 12.5 G/DL (ref 11.5–15.4)
HGB UR QL STRIP.AUTO: ABNORMAL
IMM GRANULOCYTES # BLD AUTO: 0.18 THOUSAND/UL (ref 0–0.2)
IMM GRANULOCYTES NFR BLD AUTO: 1 % (ref 0–2)
KETONES UR STRIP-MCNC: NEGATIVE MG/DL
LEUKOCYTE ESTERASE UR QL STRIP: NEGATIVE
LIPASE SERPL-CCNC: 127 U/L (ref 73–393)
LYMPHOCYTES # BLD AUTO: 2.55 THOUSANDS/ΜL (ref 0.6–4.47)
LYMPHOCYTES NFR BLD AUTO: 19 % (ref 14–44)
MCH RBC QN AUTO: 29.3 PG (ref 26.8–34.3)
MCHC RBC AUTO-ENTMCNC: 33.2 G/DL (ref 31.4–37.4)
MCV RBC AUTO: 88 FL (ref 82–98)
MONOCYTES # BLD AUTO: 0.61 THOUSAND/ΜL (ref 0.17–1.22)
MONOCYTES NFR BLD AUTO: 5 % (ref 4–12)
MUCOUS THREADS UR QL AUTO: ABNORMAL
NEUTROPHILS # BLD AUTO: 10.16 THOUSANDS/ΜL (ref 1.85–7.62)
NEUTS SEG NFR BLD AUTO: 74 % (ref 43–75)
NITRITE UR QL STRIP: NEGATIVE
NON-SQ EPI CELLS URNS QL MICRO: ABNORMAL /HPF
NRBC BLD AUTO-RTO: 0 /100 WBCS
P AXIS: 35 DEGREES
PH UR STRIP.AUTO: 5.5 [PH] (ref 4.5–8)
PLATELET # BLD AUTO: 257 THOUSANDS/UL (ref 149–390)
PMV BLD AUTO: 10 FL (ref 8.9–12.7)
POTASSIUM SERPL-SCNC: 3.9 MMOL/L (ref 3.5–5.3)
PR INTERVAL: 170 MS
PROT SERPL-MCNC: 7.4 G/DL (ref 6.4–8.2)
PROT UR STRIP-MCNC: NEGATIVE MG/DL
QRS AXIS: -9 DEGREES
QRSD INTERVAL: 108 MS
QT INTERVAL: 386 MS
QTC INTERVAL: 410 MS
RBC # BLD AUTO: 4.27 MILLION/UL (ref 3.81–5.12)
RBC #/AREA URNS AUTO: ABNORMAL /HPF
SODIUM SERPL-SCNC: 137 MMOL/L (ref 136–145)
SP GR UR STRIP.AUTO: >=1.03 (ref 1–1.03)
T WAVE AXIS: 54 DEGREES
TROPONIN I SERPL-MCNC: <0.02 NG/ML
UROBILINOGEN UR QL STRIP.AUTO: 0.2 E.U./DL
VENTRICULAR RATE: 68 BPM
WBC # BLD AUTO: 13.67 THOUSAND/UL (ref 4.31–10.16)
WBC #/AREA URNS AUTO: ABNORMAL /HPF

## 2021-04-16 PROCEDURE — 99285 EMERGENCY DEPT VISIT HI MDM: CPT

## 2021-04-16 PROCEDURE — 71045 X-RAY EXAM CHEST 1 VIEW: CPT

## 2021-04-16 PROCEDURE — 99214 OFFICE O/P EST MOD 30 MIN: CPT | Performed by: FAMILY MEDICINE

## 2021-04-16 PROCEDURE — 1036F TOBACCO NON-USER: CPT | Performed by: FAMILY MEDICINE

## 2021-04-16 PROCEDURE — 81001 URINALYSIS AUTO W/SCOPE: CPT

## 2021-04-16 PROCEDURE — 96374 THER/PROPH/DIAG INJ IV PUSH: CPT

## 2021-04-16 PROCEDURE — 80076 HEPATIC FUNCTION PANEL: CPT | Performed by: PHYSICIAN ASSISTANT

## 2021-04-16 PROCEDURE — 93005 ELECTROCARDIOGRAM TRACING: CPT

## 2021-04-16 PROCEDURE — 85025 COMPLETE CBC W/AUTO DIFF WBC: CPT | Performed by: PHYSICIAN ASSISTANT

## 2021-04-16 PROCEDURE — 84484 ASSAY OF TROPONIN QUANT: CPT | Performed by: PHYSICIAN ASSISTANT

## 2021-04-16 PROCEDURE — 36415 COLL VENOUS BLD VENIPUNCTURE: CPT | Performed by: PHYSICIAN ASSISTANT

## 2021-04-16 PROCEDURE — 3008F BODY MASS INDEX DOCD: CPT | Performed by: FAMILY MEDICINE

## 2021-04-16 PROCEDURE — 99285 EMERGENCY DEPT VISIT HI MDM: CPT | Performed by: PHYSICIAN ASSISTANT

## 2021-04-16 PROCEDURE — 81025 URINE PREGNANCY TEST: CPT | Performed by: PHYSICIAN ASSISTANT

## 2021-04-16 PROCEDURE — 93010 ELECTROCARDIOGRAM REPORT: CPT | Performed by: INTERNAL MEDICINE

## 2021-04-16 PROCEDURE — 83690 ASSAY OF LIPASE: CPT | Performed by: PHYSICIAN ASSISTANT

## 2021-04-16 PROCEDURE — 80048 BASIC METABOLIC PNL TOTAL CA: CPT | Performed by: PHYSICIAN ASSISTANT

## 2021-04-16 PROCEDURE — 74177 CT ABD & PELVIS W/CONTRAST: CPT

## 2021-04-16 RX ORDER — TELMISARTAN 40 MG/1
TABLET ORAL DAILY
COMMUNITY
End: 2021-04-20 | Stop reason: ALTCHOICE

## 2021-04-16 RX ORDER — MORPHINE SULFATE 4 MG/ML
4 INJECTION, SOLUTION INTRAMUSCULAR; INTRAVENOUS ONCE
Status: COMPLETED | OUTPATIENT
Start: 2021-04-16 | End: 2021-04-16

## 2021-04-16 RX ADMIN — IOHEXOL 100 ML: 350 INJECTION, SOLUTION INTRAVENOUS at 19:56

## 2021-04-16 RX ADMIN — MORPHINE SULFATE 4 MG: 4 INJECTION INTRAVENOUS at 18:48

## 2021-04-16 NOTE — ED PROVIDER NOTES
History  Chief Complaint   Patient presents with    Abdominal Pain     Began this past Tuesday, upper abd  +vomiting with position changes     38year old female with a history of diabetes, gastritis, hypothyroid, PCOS presents to the emergency department for evaluation of abdominal pain  Patient reports that the pain began 4 days ago and was initially located in the upper abdomen  She reports it now radiates to her RLQ  She endorses one episode NBNB emesis yesterday, nausea, and decreased PO  She denies any fever, chest pain, shortness of breath, dysuria, hematuria  She reports she began spotting 3 days ago, which is atypical because she is on birth control  She was seen at her PCP today, who counseled her to be evaluated in the ED  She has not taken anything for the pain  She has a history of  section, cholecystectomy  She also endorses some dizziness, which she describes as room spinning, denies any visual changes  History provided by:  Patient and medical records   used: No    Abdominal Pain  Pain location:  Generalized  Pain quality: sharp    Pain radiates to:  RLQ  Pain severity:  Moderate  Onset quality:  Gradual  Duration:  4 days  Progression:  Worsening  Context: previous surgery    Context: not alcohol use, not sick contacts, not suspicious food intake and not trauma    Relieved by:  None tried  Worsened by: Movement and position changes  Ineffective treatments:  Position changes  Associated symptoms: anorexia, nausea and vomiting    Associated symptoms: no belching, no chest pain, no chills, no constipation, no diarrhea, no dysuria, no hematuria, no shortness of breath, no sore throat and no vaginal discharge    Risk factors: multiple surgeries and obesity    Risk factors: not pregnant        Prior to Admission Medications   Prescriptions Last Dose Informant Patient Reported? Taking?    Blood Glucose Monitoring Suppl (ONE TOUCH ULTRA MINI) w/Device KIT   Yes No   Sig: USE AS DIRECTED DX: E11 8   Lancets (ONETOUCH ULTRASOFT) lancets   No No   Sig: Use as instructed   Multiple Vitamins-Minerals (MULTIVITAMIN ADULT) CHEW   Yes Yes   Sig: Chew   Vitamin D, Cholecalciferol, 1000 UNITS CAPS   Yes Yes   Sig: Take by mouth     glucose blood (Accu-Chek Samaria Plus) test strip   No No   Sig: Test 4 times per day   levothyroxine 150 mcg tablet   No Yes   Sig: TAKE 1 TABLET (150 MCG TOTAL) BY MOUTH DAILY IN THE EARLY MORNING   metFORMIN (GLUCOPHAGE-XR) 500 mg 24 hr tablet   No Yes   Sig: TAKE 1 TABLET BY MOUTH TWICE A DAY   norethindrone-ethinyl estradiol (Junel FE 1/20) 1-20 MG-MCG per tablet   Yes Yes   Sig: Junel FE 1/20 (28) 1 mg-20 mcg (21)/75 mg (7) tablet   TAKE 1 TABLET BY MOUTH EVERY DAY   telmisartan (MICARDIS) 40 mg tablet   Yes Yes   Sig: Daily   venlafaxine (EFFEXOR-XR) 37 5 mg 24 hr capsule   No Yes   Sig: Take 1 capsule (37 5 mg total) by mouth daily with breakfast      Facility-Administered Medications: None       Past Medical History:   Diagnosis Date    Abnormal blood chemistry     Antepartum diabetes mellitus     Cough     Disease of thyroid gland     HYPOTHYROID    Elevated blood pressure reading     Gastritis     Generalized anxiety disorder     History of asthma     History of diabetes mellitus     History of dysfunctional uterine bleeding     History of ear pain     left    History of edema     History of gestational diabetes     History of hyperlipidemia     History of hypothyroidism     History of polycystic ovarian syndrome     History of sore throat     History of upper respiratory infection     Migraines     Morbidly obese (HCC)     Nonspecific abnormal finding     Polycystic ovary     Postsurgical dumping syndrome     Seasonal allergies     Sleep apnea     Suspected fetal anomaly not found        Past Surgical History:   Procedure Laterality Date    CARDIAC ELECTROPHYSIOLOGY STUDY AND ABLATION       SECTION      CHOLECYSTECTOMY      FOOT SURGERY      GALLBLADDER SURGERY      MOUTH SURGERY      AR ESOPHAGOGASTRODUODENOSCOPY TRANSORAL DIAGNOSTIC N/A 8/16/2016    Procedure: ESOPHAGOGASTRODUODENOSCOPY (EGD); Surgeon: Silvino Appiah MD;  Location: [facility]; Service: General    TONSILLECTOMY      TOOTH EXTRACTION         Family History   Problem Relation Age of Onset    Heart disease Mother     Hypertension Mother     Valvular heart disease Mother     Breast cancer Maternal Grandmother 76        triple neg    Diabetes Maternal Grandmother     Hypertension Maternal Grandmother     Diabetes Maternal Grandfather     Arthritis Family     Varicose Veins Family         of lower extremities    No Known Problems Daughter     No Known Problems Paternal Grandmother     No Known Problems Paternal Grandfather     No Known Problems Son     No Known Problems Maternal Aunt     No Known Problems Maternal Aunt     No Known Problems Paternal Aunt     No Known Problems Paternal Aunt      I have reviewed and agree with the history as documented  E-Cigarette/Vaping    E-Cigarette Use Never User      E-Cigarette/Vaping Substances     Social History     Tobacco Use    Smoking status: Never Smoker    Smokeless tobacco: Never Used   Substance Use Topics    Alcohol use: Yes     Comment: social    Drug use: No       Review of Systems   Constitutional: Negative for chills  HENT: Negative for congestion and sore throat  Eyes: Negative for photophobia and visual disturbance  Respiratory: Negative for shortness of breath  Cardiovascular: Negative for chest pain  Gastrointestinal: Positive for abdominal pain, anorexia, nausea and vomiting  Negative for constipation and diarrhea  Genitourinary: Negative for dysuria, hematuria and vaginal discharge  Skin: Negative for rash and wound  Neurological: Positive for dizziness  Negative for syncope and headaches     All other systems reviewed and are negative  Physical Exam  Physical Exam  Vitals signs and nursing note reviewed  Constitutional:       General: She is not in acute distress  Appearance: She is well-developed  She is morbidly obese  She is not ill-appearing or toxic-appearing  Comments: Appears uncomfortable  HENT:      Head: Normocephalic and atraumatic  Right Ear: Hearing and external ear normal       Left Ear: Hearing and external ear normal       Nose: Nose normal    Eyes:      Extraocular Movements: Extraocular movements intact  Conjunctiva/sclera: Conjunctivae normal       Pupils: Pupils are equal, round, and reactive to light  Neck:      Musculoskeletal: Full passive range of motion without pain and neck supple  Cardiovascular:      Rate and Rhythm: Normal rate and regular rhythm  Heart sounds: Normal heart sounds, S1 normal and S2 normal  No murmur  Pulmonary:      Effort: Pulmonary effort is normal  No tachypnea, accessory muscle usage, prolonged expiration or respiratory distress  Breath sounds: Normal breath sounds  No decreased breath sounds, wheezing, rhonchi or rales  Abdominal:      General: A surgical scar is present  Bowel sounds are normal       Palpations: Abdomen is soft  Tenderness: There is generalized abdominal tenderness and tenderness in the right lower quadrant  There is guarding  There is no right CVA tenderness, left CVA tenderness or rebound  Positive signs include McBurney's sign  Hernia: No hernia is present  There is no hernia in the ventral area  Musculoskeletal:      Comments: Normal range of motion; no edema, tenderness, or deformities  Skin:     General: Skin is warm and dry  Findings: No rash  Neurological:      General: No focal deficit present  Mental Status: She is alert and oriented to person, place, and time  GCS: GCS eye subscore is 4  GCS verbal subscore is 5  GCS motor subscore is 6        Cranial Nerves: Cranial nerves are intact  Sensory: Sensation is intact  Motor: Motor function is intact  Gait: Gait is intact     Psychiatric:         Mood and Affect: Mood normal          Speech: Speech normal          Vital Signs  ED Triage Vitals   Temperature Pulse Respirations Blood Pressure SpO2   04/16/21 1545 04/16/21 1545 04/16/21 1545 04/16/21 1545 04/16/21 1545   98 8 °F (37 1 °C) 74 16 158/99 97 %      Temp Source Heart Rate Source Patient Position - Orthostatic VS BP Location FiO2 (%)   04/16/21 1545 04/16/21 1842 04/16/21 1545 04/16/21 1545 --   Oral Monitor Sitting Right arm       Pain Score       04/16/21 1545       4           Vitals:    04/16/21 1545 04/16/21 1842 04/16/21 2100   BP: 158/99 160/86 138/74   Pulse: 74 74 77   Patient Position - Orthostatic VS: Sitting Lying Sitting         Visual Acuity      ED Medications  Medications   morphine (PF) 4 mg/mL injection 4 mg (4 mg Intravenous Given 4/16/21 1848)   iohexol (OMNIPAQUE) 350 MG/ML injection (MULTI-DOSE) 100 mL (100 mL Intravenous Given 4/16/21 1956)       Diagnostic Studies  Results Reviewed     Procedure Component Value Units Date/Time    Troponin I [[id number]]  (Normal) Collected: 04/16/21 1851    Lab Status: Final result Specimen: Blood from Arm, Right Updated: 04/16/21 1921     Troponin I <0 02 ng/mL     Urine Microscopic [[id number]]  (Abnormal) Collected: 04/16/21 1901    Lab Status: Final result Specimen: Urine, Clean Catch Updated: 04/16/21 1920     RBC, UA 0-1 /hpf      WBC, UA 0-1 /hpf      Epithelial Cells Occasional /hpf      Bacteria, UA Occasional /hpf      MUCUS THREADS Occasional    Basic metabolic panel [[id number]]  (Abnormal) Collected: 04/16/21 1851    Lab Status: Final result Specimen: Blood from Arm, Right Updated: 04/16/21 1919     Sodium 137 mmol/L      Potassium 3 9 mmol/L      Chloride 101 mmol/L      CO2 29 mmol/L      ANION GAP 7 mmol/L      BUN 13 mg/dL      Creatinine 0 81 mg/dL      Glucose 144 mg/dL      Calcium 9 0 mg/dL eGFR 90 ml/min/1 73sq m     Narrative:      [place] guidelines for Chronic Kidney Disease (CKD):     Stage 1 with normal or high GFR (GFR > 90 mL/min/1 73 square meters)    Stage 2 Mild CKD (GFR = 60-89 mL/min/1 73 square meters)    Stage 3A Moderate CKD (GFR = 45-59 mL/min/1 73 square meters)    Stage 3B Moderate CKD (GFR = 30-44 mL/min/1 73 square meters)    Stage 4 Severe CKD (GFR = 15-29 mL/min/1 73 square meters)    Stage 5 End Stage CKD (GFR <15 mL/min/1 73 square meters)  Note: GFR calculation is accurate only with a steady state creatinine    Hepatic function panel [[id number]]  (Normal) Collected: 04/16/21 1851    Lab Status: Final result Specimen: Blood from Arm, Right Updated: 04/16/21 1919     Total Bilirubin 0 33 mg/dL      Bilirubin, Direct 0 13 mg/dL      Alkaline Phosphatase 97 U/L      AST 19 U/L      ALT 26 U/L      Total Protein 7 4 g/dL      Albumin 3 6 g/dL     Lipase [[id number]]  (Normal) Collected: 04/16/21 1851    Lab Status: Final result Specimen: Blood from Arm, Right Updated: 04/16/21 1919     Lipase 127 u/L     CBC and differential [[id number]]  (Abnormal) Collected: 04/16/21 1851    Lab Status: Final result Specimen: Blood from Arm, Right Updated: 04/16/21 1904     WBC 13 67 Thousand/uL      RBC 4 27 Million/uL      Hemoglobin 12 5 g/dL      Hematocrit 37 7 %      MCV 88 fL      MCH 29 3 pg      MCHC 33 2 g/dL      RDW 13 9 %      MPV 10 0 fL      Platelets 976 Thousands/uL      nRBC 0 /100 WBCs      Neutrophils Relative 74 %      Immat GRANS % 1 %      Lymphocytes Relative 19 %      Monocytes Relative 5 %      Eosinophils Relative 1 %      Basophils Relative 0 %      Neutrophils Absolute 10 16 Thousands/µL      Immature Grans Absolute 0 18 Thousand/uL      Lymphocytes Absolute 2 55 Thousands/µL      Monocytes Absolute 0 61 Thousand/µL      Eosinophils Absolute 0 14 Thousand/µL      Basophils Absolute 0 03 Thousands/µL     POCT urinalysis dipstick [[id number]]  (Abnormal) Resulted: 04/16/21 1902    Lab Status: Final result Specimen: Urine Updated: 04/16/21 1904     Color, UA Yellow     Clarity, UA Clear    Urine Macroscopic, POC [[id number]]  (Abnormal) Collected: 04/16/21 1901    Lab Status: Final result Specimen: Urine Updated: 04/16/21 1902     Color, UA Yellow     Clarity, UA Clear     pH, UA 5 5     Leukocytes, UA Negative     Nitrite, UA Negative     Protein, UA Negative mg/dl      Glucose, UA Negative mg/dl      Ketones, UA Negative mg/dl      Urobilinogen, UA 0 2 E U /dl      Bilirubin, UA Negative     Blood, UA Moderate     Specific Gravity, UA >=1 030    Narrative:      CLINITEK RESULT    POCT pregnancy, urine [[id number]]  (Normal) Resulted: 04/16/21 1840    Lab Status: Final result Updated: 04/16/21 1850     EXT PREG TEST UR (Ref: Negative) negative     Control valid                 CT abdomen pelvis with contrast   Final Result by Maria Isabel Schroeder MD (04/16 2013)      No acute inflammatory changes in the abdomen or pelvis  The appendix is noninflamed in this patient with right lower quadrant pain  Reidentified unchanged hepatosplenomegaly and hepatic steatosis              Workstation performed: [id number]         XR chest 1 view portable   ED Interpretation by Yuridia Galindo PA-C (51/86 [id number])   Preliminary read: no acute cardiopulmonary disease                 Procedures  ECG 12 Lead Documentation Only    Date/Time: 4/16/2021 8:48 PM  Performed by: Yuridia Galindo PA-C  Authorized by: Yuridia Galindo PA-C     Indications / Diagnosis:  Epigastric pain  ECG reviewed by me, the ED Provider: yes (also Dr Castillo Hdez)    Patient location:  ED  Previous ECG:     Previous ECG:  Unavailable  Rate:     ECG rate:  68    ECG rate assessment: normal    Rhythm:     Rhythm: sinus rhythm    Ectopy:     Ectopy: none    QRS:     QRS axis:  Normal  Conduction:     Conduction: abnormal      Abnormal conduction: incomplete RBBB    ST segments: ST segments:  Normal  T waves:     T waves: normal               ED Course  ED Course as of Apr 16 2136 Fri Apr 16, 2021 1821 Patient seen and examined; will order EKG, CXR, labs, CT A/P      1845 Blood Pressure: 160/86   1845 Pulse: 74   1845 Respirations: 18   1845 SpO2: 97 %   1850 PREGNANCY TEST URINE: negative   1903 Blood, UA(!): Moderate   1903 Leukocytes, UA: Negative   1907 WBC(!): 13 67   1907 Hemoglobin: 12 5   1907 HCT: 37 7   1907 Platelet Count: 579   1921 Lipase: [address]: 0 33   1921 AST: 19   1921 ALT: 26   1921 Sodium: 137   1921 Chloride: 101   1921 Creatinine: 0 81   1921 Glucose, Random(!): 144   1923 NAD   XR chest 1 view portable   1923 Troponin I: <0 02   2029 IMPRESSION:     No acute inflammatory changes in the abdomen or pelvis  The appendix is noninflamed in this patient with right lower quadrant pain      Reidentified unchanged hepatosplenomegaly and hepatic steatosis  CT abdomen pelvis with contrast   2049 Updated patient on labs, CT  No acute findings to explain abdominal pain  Patient reports pain returning  SBIRT 20yo+      Most Recent Value   SBIRT (22 yo +)   In order to provide better care to our patients, we are screening all of our patients for alcohol and drug use  Would it be okay to ask you these screening questions? Yes Filed at: 04/16/2021 1853   Initial Alcohol Screen: US AUDIT-C    1  How often do you have a drink containing alcohol? 3 Filed at: 04/16/2021 1853   2  How many drinks containing alcohol do you have on a typical day you are drinking? 1 Filed at: 04/16/2021 1853   3a  Male UNDER 65: How often do you have five or more drinks on one occasion? 0 Filed at: 04/16/2021 1853   3b  FEMALE Any Age, or MALE 65+: How often do you have 4 or more drinks on one occassion? 0 Filed at: 04/16/2021 1853   Audit-C Score  4 Filed at: 04/16/2021 1853   JUNE: How many times in the past year have you       Used an illegal drug or used a prescription medication for non-medical reasons? Never Filed at: 04/16/2021 [facility]                    [facility]  Number of Diagnoses or Management Options  Dizziness:   Generalized abdominal pain:   Hepatic steatosis:   Hepatosplenomegaly:   Leukocytosis:   Diagnosis management comments: 38year old female with a history of diabetes, gastritis, hypothyroid, PCOS presents to the emergency department for evaluation of abdominal pain  Nonspecific leukocytosis  Neuro exam WNL, no focal deficits  CT A/P does not show acute process, however redemonstrated known hepatosplenomegaly and hepatic steatosis  Patient is aware of these and likely not the cause of her pain  Differential diagnoses includes: acute appendicitis, SBO, pyelonephritis, ovarian pathology, doubt ACS  Abdominal exam without peritoneal signs  No evidence of acute abdomen at this time  Well appearing  Low suspicion for acute hepatobiliary disease, acute pancreatitis, PUD (including perforation), acute infectious process (pneumonia, hepatitis, pyelonephritis), acute appendicitis, vascular catastrophe, bowel obstruction or viscus perforation  Presentation not consistent with other acute, emergent causes of abdominal pain at this time  While the cause of the patient's complaints is most likely benign, it is possible that this is the early presentation of a more serious condition  This diagnostic uncertainty was discussed with the patient, the importance of follow up care, as well as the need to return to immediately return to the closest emergency department for the concerning signs and symptoms listed in the discharge instructions  The patient stated they were aware of this diagnostic uncertainty, understood the importance of follow up and were comfortable being discharged   I believe that discharge home with outpatient follow up for further evaluation is medically appropriate  I discussed supportive care, importance of follow-up and return precautions  Patient expressed understanding  Follow up with GI  Amount and/or Complexity of Data Reviewed  Clinical lab tests: ordered and reviewed  Tests in the radiology section of CPT®: ordered        Disposition  Final diagnoses:   Generalized abdominal pain   Hepatosplenomegaly   Hepatic steatosis   Leukocytosis   Dizziness     Time reflects when diagnosis was documented in both MDM as applicable and the Disposition within this note     Time User Action Codes Description Comment    4/16/2021  8:32 PM [address] [R10 84] Generalized abdominal pain     4/16/2021  8:32 PM Buelah Abt Add [R16 2] Hepatosplenomegaly     4/16/2021  8:32 PM Buelah Abt Add [K76 0] Hepatic steatosis     4/16/2021  8:35 PM [address] [L14 966] Leukocytosis     4/16/2021  9:35 PM Buelah Abt Add [R42] Dizziness       ED Disposition     ED Disposition Condition Date/Time Comment    Discharge Stable Fri Apr 16, 2021  8:50 PM [address]  discharge to home/self care  Follow-up Information     Follow up With Specialties Details Why Contact Info Additional Information    Levi Sanchez DO Family Medicine Schedule an appointment as soon as possible for a visit in 3 days  [address]    [address]   [address] [facility]  [facility] Schedule an appointment as soon as possible for a visit in 1 week follow up abdominal pain [address]  [place] [address] [facility] [address]  Jerardo Mann [address] [facility] [place], [address], [address], [place], [place], [address]   [facility] Emergency Department Emergency Medicine Go to  If symptoms worsen [facility] [id number]  [address] [facility] Emergency Department, [address] [facility] [place]  , [place], [place], [address]          Discharge Medication List as of 4/16/2021  8:52 PM      CONTINUE these medications which have NOT CHANGED    Details   levothyroxine 150 mcg tablet TAKE 1 TABLET (150 MCG TOTAL) BY MOUTH DAILY IN THE EARLY MORNING, Starting Wed 12/16/2020, Normal      metFORMIN (GLUCOPHAGE-XR) 500 mg 24 hr tablet TAKE 1 TABLET BY MOUTH TWICE A DAY, Normal      Multiple Vitamins-Minerals (MULTIVITAMIN ADULT) CHEW Chew, Historical Med      norethindrone-ethinyl estradiol (Junel FE 1/20) 1-20 MG-MCG per tablet Junel FE 1/20 (28) 1 mg-20 mcg (21)/75 mg (7) tablet   TAKE 1 TABLET BY MOUTH EVERY DAY, Historical Med      telmisartan (MICARDIS) 40 mg tablet Daily, Historical Med      venlafaxine (EFFEXOR-XR) 37 5 mg 24 hr capsule Take 1 capsule (37 5 mg total) by mouth daily with breakfast, Starting Tue 2/9/2021, Normal      Vitamin D, Cholecalciferol, 1000 UNITS CAPS Take by mouth , Until Discontinued, Historical Med      Blood Glucose Monitoring Suppl (ONE TOUCH ULTRA MINI) w/Device KIT USE AS DIRECTED DX: E11 8, Historical Med      glucose blood (Accu-Chek Samaria Plus) test strip Test 4 times per day, Normal      Lancets (ONETOUCH ULTRASOFT) lancets Use as instructed, Normal           No discharge procedures on file      PDMP Review     None          ED Provider  Electronically Signed by           German Mortensen PA-C  04/16/21 0709

## 2021-04-16 NOTE — PROGRESS NOTES
BMI Counseling: Body mass index is 45 76 kg/m²  The BMI is above normal  Nutrition recommendations include reducing portion sizes, decreasing overall calorie intake, 3-5 servings of fruits/vegetables daily, reducing fast food intake, consuming healthier snacks, decreasing soda and/or juice intake, moderation in carbohydrate intake, increasing intake of lean protein, reducing intake of saturated fat and trans fat and reducing intake of cholesterol  Exercise recommendations include exercising 3-5 times per week

## 2021-04-16 NOTE — PROGRESS NOTES
Assessment/Plan:    Recommend patient evaluation into the emergency room to rule out possible appendicitis versus other  Patient will have friend pick her up to take her to the emergency room as I told her she should not drive  Last A1c 7 9  Overdue for follow-up due to noncompliance history  Blood pressure stable  Await findings from the emergency room  Diagnoses and all orders for this visit:    Abdominal pain, RLQ  -     Transfer to other facility    Non-intractable vomiting with nausea, unspecified vomiting type  -     Transfer to other facility    Chills    Near syncope    Type 2 diabetes mellitus without complication, without long-term current use of insulin ([facility] )    Other orders  -     Cancel: Liquid-based pap, screening ([facility]); Future  -     Cancel: Microalbumin / creatinine urine ratio ([facility]); Future  -     Cancel: Hemoglobin A1C ([facility]); Future  -     Cancel: Ambulatory Referral to Ophthalmology; Future  -     telmisartan (MICARDIS) 40 mg tablet; Daily        1  Abdominal pain, RLQ  Transfer to other facility   2  Non-intractable vomiting with nausea, unspecified vomiting type  Transfer to other facility   3  Chills     4  Near syncope     5  Type 2 diabetes mellitus without complication, without long-term current use of insulin ([facility])         Subjective:        Patient ID: Antoine Collins is a 38 y o  female  Chief Complaint   Patient presents with    Abdominal Pain     ULQ abd pain       48-year-old blood Rema obese white female known to us for diabetes here today with 3+ days worth of abdominal pain  Abdominal pain she states started in the left upper quadrant but has since migrated to the right and now down to the right lower quadrant  Has had nausea and vomiting episode  Has had chills but no demonstrated fever  Has history of cholecystectomy        The following portions of the patient's history were reviewed and updated as appropriate: past medical history, past surgical history and problem list       Review of Systems   Constitutional: Positive for chills  Negative for appetite change, fatigue, fever and unexpected weight change  HENT: Negative for congestion, ear pain, postnasal drip, rhinorrhea, sinus pressure, sinus pain and sore throat  Eyes: Negative for redness and visual disturbance  Respiratory: Negative for chest tightness and shortness of breath  Cardiovascular: Negative for chest pain, palpitations and leg swelling  Gastrointestinal: Positive for abdominal pain, nausea and vomiting  Negative for abdominal distention and diarrhea  Endocrine: Negative for cold intolerance and heat intolerance  Genitourinary: Negative for dysuria and hematuria  Musculoskeletal: Negative for arthralgias, gait problem and myalgias  Skin: Negative for pallor and rash  Neurological: Negative for dizziness and headaches  Feels faint especially when lying down   Psychiatric/Behavioral: Negative for behavioral problems  The patient is not nervous/anxious  Objective:  /86 (BP Location: Left arm, Patient Position: Sitting, Cuff Size: Large)   Pulse 78   Temp (!) 96 3 °F (35 7 °C) (Temporal)   Resp 17   Ht 5' 1" (1 549 m)   Wt 110 kg (242 lb 3 2 oz)   SpO2 97%   BMI 45 76 kg/m²        Physical Exam  Vitals signs and nursing note reviewed  Constitutional:       General: She is in acute distress  Appearance: She is obese  She is ill-appearing  She is not diaphoretic  HENT:      Head: Normocephalic and atraumatic  Eyes:      General: No scleral icterus  Conjunctiva/sclera: Conjunctivae normal       Pupils: Pupils are equal, round, and reactive to light  Neck:      Musculoskeletal: Normal range of motion and neck supple  Thyroid: No thyromegaly  Cardiovascular:      Rate and Rhythm: Normal rate and regular rhythm  Pulses:           Carotid pulses are 0 on the right side and 0 on the left side       Heart sounds: Normal heart sounds  No murmur  Pulmonary:      Effort: Pulmonary effort is normal  No respiratory distress  Breath sounds: Normal breath sounds  No wheezing  Abdominal:      General: There is no distension  Tenderness: There is abdominal tenderness  Musculoskeletal:      Right lower leg: No edema  Left lower leg: No edema  Lymphadenopathy:      Cervical: No cervical adenopathy  Skin:     General: Skin is warm  Coloration: Skin is not pale  Neurological:      General: No focal deficit present  Mental Status: She is alert and oriented to person, place, and time  Cranial Nerves: No cranial nerve deficit  Deep Tendon Reflexes: Reflexes are normal and symmetric  Psychiatric:         Mood and Affect: Mood normal          Behavior: Behavior normal          Thought Content:  Thought content normal          Judgment: Judgment normal

## 2021-04-29 DIAGNOSIS — E11.9 TYPE 2 DIABETES MELLITUS WITHOUT COMPLICATION, WITHOUT LONG-TERM CURRENT USE OF INSULIN (HCC): ICD-10-CM

## 2021-04-30 RX ORDER — METFORMIN HYDROCHLORIDE 500 MG/1
500 TABLET, EXTENDED RELEASE ORAL 2 TIMES DAILY
Qty: 180 TABLET | Refills: 0 | Status: SHIPPED | OUTPATIENT
Start: 2021-04-30 | End: 2021-08-24

## 2021-05-10 ENCOUNTER — RA CDI HCC (OUTPATIENT)
Dept: OTHER | Facility: HOSPITAL | Age: 41
End: 2021-05-10

## 2021-05-10 NOTE — PROGRESS NOTES
[place] [address]  coding opportunities        DX not used     Chart reviewed, (number of) suggestions sent to provider: 1           Patients insurance company: [address]  (Medicare Advantage and Commercial)     Visit status: Patient arrived for their scheduled appointment     Provider never responded to [place] [address]  coding request     [place] [address]  coding opportunities        DX: E66 01 Morbid Obesity--BMI 45        Chart reviewed, (number of) suggestions sent to provider: 1           Patients insurance company: [address]  (Medicare Advantage and Commercial)

## 2021-05-14 ENCOUNTER — OFFICE VISIT (OUTPATIENT)
Dept: FAMILY MEDICINE CLINIC | Facility: CLINIC | Age: 41
End: 2021-05-14
Payer: COMMERCIAL

## 2021-05-14 ENCOUNTER — TELEPHONE (OUTPATIENT)
Dept: ADMINISTRATIVE | Facility: OTHER | Age: 41
End: 2021-05-14

## 2021-05-14 VITALS
DIASTOLIC BLOOD PRESSURE: 78 MMHG | WEIGHT: 250.6 LBS | SYSTOLIC BLOOD PRESSURE: 122 MMHG | HEART RATE: 84 BPM | BODY MASS INDEX: 47.31 KG/M2 | HEIGHT: 61 IN | RESPIRATION RATE: 16 BRPM | TEMPERATURE: 97.6 F | OXYGEN SATURATION: 97 %

## 2021-05-14 DIAGNOSIS — E78.2 MIXED HYPERLIPIDEMIA: ICD-10-CM

## 2021-05-14 DIAGNOSIS — I10 ESSENTIAL HYPERTENSION: ICD-10-CM

## 2021-05-14 DIAGNOSIS — E11.9 TYPE 2 DIABETES MELLITUS WITHOUT COMPLICATION, WITHOUT LONG-TERM CURRENT USE OF INSULIN (HCC): Primary | ICD-10-CM

## 2021-05-14 DIAGNOSIS — E06.3 HYPOTHYROIDISM DUE TO HASHIMOTO'S THYROIDITIS: ICD-10-CM

## 2021-05-14 DIAGNOSIS — E66.01 MORBID OBESITY (HCC): ICD-10-CM

## 2021-05-14 DIAGNOSIS — E03.8 HYPOTHYROIDISM DUE TO HASHIMOTO'S THYROIDITIS: ICD-10-CM

## 2021-05-14 LAB — SL AMB POCT HEMOGLOBIN AIC: 8 (ref ?–6.5)

## 2021-05-14 PROCEDURE — 1036F TOBACCO NON-USER: CPT | Performed by: FAMILY MEDICINE

## 2021-05-14 PROCEDURE — 3052F HG A1C>EQUAL 8.0%<EQUAL 9.0%: CPT | Performed by: FAMILY MEDICINE

## 2021-05-14 PROCEDURE — 4010F ACE/ARB THERAPY RXD/TAKEN: CPT | Performed by: FAMILY MEDICINE

## 2021-05-14 PROCEDURE — 3078F DIAST BP <80 MM HG: CPT | Performed by: FAMILY MEDICINE

## 2021-05-14 PROCEDURE — 99214 OFFICE O/P EST MOD 30 MIN: CPT | Performed by: FAMILY MEDICINE

## 2021-05-14 PROCEDURE — 83036 HEMOGLOBIN GLYCOSYLATED A1C: CPT | Performed by: FAMILY MEDICINE

## 2021-05-14 PROCEDURE — 3074F SYST BP LT 130 MM HG: CPT | Performed by: FAMILY MEDICINE

## 2021-05-14 RX ORDER — TELMISARTAN 40 MG/1
40 TABLET ORAL DAILY
Qty: 90 TABLET | Refills: 1 | Status: SHIPPED | OUTPATIENT
Start: 2021-05-14 | End: 2021-12-03

## 2021-05-14 RX ORDER — DULAGLUTIDE 0.75 MG/.5ML
0.75 INJECTION, SOLUTION SUBCUTANEOUS WEEKLY
Qty: 2 ML | Refills: 4 | Status: SHIPPED | OUTPATIENT
Start: 2021-05-14 | End: 2021-10-14

## 2021-05-14 NOTE — TELEPHONE ENCOUNTER
Upon review of the In Basket request and the patient's chart, initial outreach has been made via fax, please see Contacts section for details       Thank you  Margarita Shelley

## 2021-05-14 NOTE — LETTER
Procedure Request Form: Cervical Cancer Screening      Date Requested: 21  Patient: Nieves [place]  Patient : 1980   Referring Provider: Ulises Colon, DO        Date of Procedure ______________________________       The above patient has informed us that they have completed their   most recent Cervical Cancer Screening at your facility  Please complete   this form and attach all corresponding procedure reports/results  Comments __________________________________________________________  ____________________________________________________________________  ____________________________________________________________________  ____________________________________________________________________    Facility Completing Procedure _________________________________________    Form Completed By (print name) _______________________________________      Signature __________________________________________________________      These reports are needed for  compliance    Please fax this completed form and a copy of the procedure report to our office located at [place] [address] as soon as possible to [phone] miguel Shipley: Phone [phone]    We thank you for your assistance in treating our mutual patient

## 2021-05-14 NOTE — TELEPHONE ENCOUNTER
----- Message from Mary Barber sent at 5/14/2021 11:05 AM EDT -----  Regarding: Request Quality  05/14/21 11:05 AM    Cintia, our patient Nieves Wiley has had Pap Smear (HPV) aka Cervical Cancer Screening completed/performed  Please assist in updating the patient chart by pulling the document from the Media Tab  The date of service is 03/01/2021, [facility]       Thank you,  [facility]

## 2021-05-17 ENCOUNTER — TELEPHONE (OUTPATIENT)
Dept: ADMINISTRATIVE | Facility: OTHER | Age: 41
End: 2021-05-17

## 2021-05-17 NOTE — LETTER
Diabetic Eye Exam Form    Date Requested: 21  Patient: Waqar Child  Patient : 1980   Referring Provider: Renae Morgan DO    Dilated Retinal Exam, Optomap-Iris Exam, or Fundus Photography Done         Yes ([place] one above)         No     Date of Diabetic Eye Exam ______________________________  Left Eye      Exam did show retinopathy    Exam did not show retinopathy         Mild       Moderate       None       Proliferative       Severe     Right Eye     Exam did show retinopathy    Exam did not show retinopathy         Mild       Moderate       None       Proliferative       Severe     Comments __________________________________________________________    Practice Providing Exam ______________________________________________    Exam Performed By (print name) _______________________________________      Provider Signature ___________________________________________________      These reports are needed for  compliance    Please fax this completed form and a copy of the Diabetic Eye Exam report to our office located at [place] [address] as soon as possible to [phone] miguel Walters [phone]    We thank you for your assistance in treating our mutual patient

## 2021-05-17 NOTE — TELEPHONE ENCOUNTER
----- Message from Toni Henning sent at 5/13/2021  1:42 PM EDT -----  Regarding: Request Quality  05/13/21 1:42 PM    Hello, our patient Cristiano Paul has had Diabetic Eye Exam completed/performed  Please assist in updating the patient chart by making an External outreach to [facility] located in [address] [place], [place] [id number]   The date of service is 7/31/2020    Thank you,  Flory Lerma MA  [facility]

## 2021-05-17 NOTE — LETTER
Diabetic Eye Exam Form    Date Requested: 21  Patient: Tyesha García  Patient : 1980   Referring Provider: Paras Mackenzie DO    Dilated Retinal Exam, Optomap-Iris Exam, or Fundus Photography Done         Yes ([place] one above)         No     Date of Diabetic Eye Exam ______________________________  Left Eye      Exam did show retinopathy    Exam did not show retinopathy         Mild       Moderate       None       Proliferative       Severe     Right Eye     Exam did show retinopathy    Exam did not show retinopathy         Mild       Moderate       None       Proliferative       Severe     Comments __________________________________________________________    Practice Providing Exam ______________________________________________    Exam Performed By (print name) _______________________________________      Provider Signature ___________________________________________________      These reports are needed for  compliance    Please fax this completed form and a copy of the Diabetic Eye Exam report to our office located at [place] [address] as soon as possible to [phone] miguel Maldonado: [phone]    We thank you for your assistance in treating our mutual patient

## 2021-05-17 NOTE — TELEPHONE ENCOUNTER
Upon review of the In Basket request and the patient's chart, initial outreach has been made via fax, please see Contacts section for details       Thank you  Edmund Cordoba

## 2021-05-19 NOTE — TELEPHONE ENCOUNTER
As a follow-up, a second attempt has been made for outreach via fax, please see Contacts section for details      Thank you  Allegra Lr

## 2021-05-21 NOTE — TELEPHONE ENCOUNTER
As a follow-up, a second attempt has been made for outreach via fax, please see Contacts section for details      Thank you  Soraya Velásquez

## 2021-05-23 PROBLEM — I10 ESSENTIAL HYPERTENSION: Status: ACTIVE | Noted: 2021-05-23

## 2021-05-23 NOTE — PROGRESS NOTES
Assessment/Plan:    Blood pressure stable  Continue ARB  A1c today is 8 0  Discussed AACE  Guidelines  Willing to start Trulicity 0 5 mg weekly  Benefits and side effects reviewed  Recheck 3 months with repeat A1c  Needs extreme risk factor modification with regards to morbid obesity  COVID vaccine up-to-date  Continue metformin  Can reduce to 1 dose daily pending GI issues  Continue current levothyroxine  Recommend yearly diabetic eye and foot examination  Recommend overdue Pap smear  Diagnoses and all orders for this visit:    Type 2 diabetes mellitus without complication, without long-term current use of insulin (HCC)  -     POCT hemoglobin A1c  -     Dulaglutide (Trulicity) 5 75 JU/6 0AY SOPN; Inject 0 5 mL (0 75 mg total) under the skin once a week    Morbid obesity (HCC)    Essential hypertension  -     telmisartan (MICARDIS) 40 mg tablet; Take 1 tablet (40 mg total) by mouth daily    Hypothyroidism due to Hashimoto's thyroiditis    Mixed hyperlipidemia        1  Type 2 diabetes mellitus without complication, without long-term current use of insulin (HCC)  POCT hemoglobin A1c    Dulaglutide (Trulicity) 8 01 VR/4 9LS SOPN   2  Morbid obesity ([address] )     3  Essential hypertension  telmisartan (MICARDIS) 40 mg tablet   4  Hypothyroidism due to Hashimoto's thyroiditis     5  Mixed hyperlipidemia         Subjective:        Patient ID: Raheem Bowers is a 42 y o  female  Chief Complaint   Patient presents with    Follow-up     6 month follow up     Physical Exam         Patient for blood pressure check  Patient did not get labs as previously ordered because she ended up in the emergency room with chest related symptoms  The therefore A1c will be done in office today  Recent ER visit reviewed        The following portions of the patient's history were reviewed and updated as appropriate: past medical history, past surgical history and problem list       Review of Systems   Constitutional: Negative for appetite change, fatigue, fever and unexpected weight change  HENT: Negative for congestion, ear pain, postnasal drip, rhinorrhea, sinus pressure, sinus pain and sore throat  Eyes: Negative for redness and visual disturbance  Respiratory: Negative for chest tightness and shortness of breath  Cardiovascular: Negative for chest pain, palpitations and leg swelling  Gastrointestinal: Negative for abdominal distention, abdominal pain, diarrhea and nausea  Endocrine: Negative for cold intolerance and heat intolerance  Genitourinary: Negative for dysuria and hematuria  Musculoskeletal: Negative for arthralgias, gait problem and myalgias  Skin: Negative for pallor and rash  Neurological: Negative for dizziness and headaches  Psychiatric/Behavioral: Negative for behavioral problems  The patient is not nervous/anxious  Objective:  /78 (BP Location: Left arm, Patient Position: Sitting, Cuff Size: Large)   Pulse 84   Temp 97 6 °F (36 4 °C) (Temporal)   Resp 16   Ht 5' 1" (1 549 m)   Wt 114 kg (250 lb 9 6 oz)   LMP 04/29/2021 (Exact Date)   SpO2 97%   BMI 47 35 kg/m²        Physical Exam  Vitals signs and nursing note reviewed  Constitutional:       General: She is not in acute distress  Appearance: She is obese  She is not diaphoretic  HENT:      Head: Normocephalic and atraumatic  Eyes:      General: No scleral icterus  Conjunctiva/sclera: Conjunctivae normal       Pupils: Pupils are equal, round, and reactive to light  Neck:      Musculoskeletal: Normal range of motion and neck supple  Thyroid: No thyromegaly  Cardiovascular:      Rate and Rhythm: Normal rate and regular rhythm  Pulses:           Carotid pulses are 0 on the right side and 0 on the left side  Heart sounds: Normal heart sounds  No murmur  Pulmonary:      Effort: Pulmonary effort is normal  No respiratory distress  Breath sounds: Normal breath sounds  No wheezing  Abdominal:      General: There is no distension  Musculoskeletal:      Right lower leg: No edema  Left lower leg: No edema  Lymphadenopathy:      Cervical: No cervical adenopathy  Skin:     General: Skin is warm  Coloration: Skin is not pale  Neurological:      General: No focal deficit present  Mental Status: She is alert and oriented to person, place, and time  Cranial Nerves: No cranial nerve deficit  Deep Tendon Reflexes: Reflexes are normal and symmetric  Psychiatric:         Mood and Affect: Mood normal          Behavior: Behavior normal          Thought Content:  Thought content normal          Judgment: Judgment normal

## 2021-05-25 NOTE — TELEPHONE ENCOUNTER
As a final attempt, a third outreach has been made via telephone call  The outcome of the telephone call was a fax request form to be sent to Office  Please see Contacts section for details  This encounter will be closed and completed by end of day  Should we receive the requested information because of previous outreach attempts, the requested patient's chart will be updated appropriately       Thank you  hDiraj Tim

## 2021-06-04 LAB
LEFT EYE DIABETIC RETINOPATHY: NORMAL
RIGHT EYE DIABETIC RETINOPATHY: NORMAL

## 2021-06-04 NOTE — TELEPHONE ENCOUNTER
Upon review of the In Basket request we were able to locate, review, and update the patient chart as requested for Diabetic Eye Exam     Any additional questions or concerns should be emailed to the Practice Liaisons via [email or web address] email, please do not reply via In Basket      Thank you  Colten Cannon

## 2021-06-04 NOTE — TELEPHONE ENCOUNTER
As a final attempt, a third outreach has been made via telephone call  Please see Contacts section for details  This encounter will be closed and completed by end of day  Should we receive the requested information because of previous outreach attempts, the requested patient's chart will be updated appropriately  Spoke with Julio Littlejohn  She stated that she will re-fax     Thank you  Solomon Wilks

## 2021-06-17 DIAGNOSIS — F41.9 ANXIETY: ICD-10-CM

## 2021-06-17 RX ORDER — VENLAFAXINE HYDROCHLORIDE 37.5 MG/1
37.5 CAPSULE, EXTENDED RELEASE ORAL
Qty: 90 CAPSULE | Refills: 0 | Status: SHIPPED | OUTPATIENT
Start: 2021-06-17 | End: 2021-12-03

## 2021-08-21 LAB
ALBUMIN SERPL-MCNC: 4 G/DL (ref 3.6–5.1)
ALBUMIN/CREAT UR: 7 MCG/MG CREAT
ALBUMIN/GLOB SERPL: 1.5 (CALC) (ref 1–2.5)
ALP SERPL-CCNC: 80 U/L (ref 31–125)
ALT SERPL-CCNC: 8 U/L (ref 6–29)
AST SERPL-CCNC: 9 U/L (ref 10–30)
BILIRUB SERPL-MCNC: 0.3 MG/DL (ref 0.2–1.2)
BUN SERPL-MCNC: 11 MG/DL (ref 7–25)
BUN/CREAT SERPL: ABNORMAL (CALC) (ref 6–22)
CALCIUM SERPL-MCNC: 8.9 MG/DL (ref 8.6–10.2)
CHLORIDE SERPL-SCNC: 104 MMOL/L (ref 98–110)
CHOLEST SERPL-MCNC: 146 MG/DL
CHOLEST/HDLC SERPL: 4.3 (CALC)
CO2 SERPL-SCNC: 23 MMOL/L (ref 20–32)
CREAT SERPL-MCNC: 0.7 MG/DL (ref 0.5–1.1)
CREAT UR-MCNC: 163 MG/DL (ref 20–275)
GLOBULIN SER CALC-MCNC: 2.6 G/DL (CALC) (ref 1.9–3.7)
GLUCOSE SERPL-MCNC: 196 MG/DL (ref 65–99)
HBA1C MFR BLD: 6.8 % OF TOTAL HGB
HDLC SERPL-MCNC: 34 MG/DL
LDLC SERPL CALC-MCNC: 84 MG/DL (CALC)
MICROALBUMIN UR-MCNC: 1.1 MG/DL
NONHDLC SERPL-MCNC: 112 MG/DL (CALC)
POTASSIUM SERPL-SCNC: 4.3 MMOL/L (ref 3.5–5.3)
PROT SERPL-MCNC: 6.6 G/DL (ref 6.1–8.1)
SL AMB EGFR AFRICAN AMERICAN: 124 ML/MIN/1.73M2
SL AMB EGFR NON AFRICAN AMERICAN: 107 ML/MIN/1.73M2
SODIUM SERPL-SCNC: 138 MMOL/L (ref 135–146)
TRIGL SERPL-MCNC: 182 MG/DL
TSH SERPL-ACNC: 1.99 MIU/L

## 2021-08-21 PROCEDURE — 3061F NEG MICROALBUMINURIA REV: CPT | Performed by: FAMILY MEDICINE

## 2021-08-21 PROCEDURE — 3044F HG A1C LEVEL LT 7.0%: CPT | Performed by: FAMILY MEDICINE

## 2021-08-24 ENCOUNTER — OFFICE VISIT (OUTPATIENT)
Dept: FAMILY MEDICINE CLINIC | Facility: CLINIC | Age: 41
End: 2021-08-24
Payer: COMMERCIAL

## 2021-08-24 VITALS
HEART RATE: 86 BPM | BODY MASS INDEX: 45.27 KG/M2 | DIASTOLIC BLOOD PRESSURE: 76 MMHG | WEIGHT: 239.8 LBS | RESPIRATION RATE: 15 BRPM | SYSTOLIC BLOOD PRESSURE: 120 MMHG | OXYGEN SATURATION: 97 % | TEMPERATURE: 96.7 F | HEIGHT: 61 IN

## 2021-08-24 DIAGNOSIS — E03.8 HYPOTHYROIDISM DUE TO HASHIMOTO'S THYROIDITIS: ICD-10-CM

## 2021-08-24 DIAGNOSIS — E06.3 HYPOTHYROIDISM DUE TO HASHIMOTO'S THYROIDITIS: ICD-10-CM

## 2021-08-24 DIAGNOSIS — E78.2 MIXED HYPERLIPIDEMIA: ICD-10-CM

## 2021-08-24 DIAGNOSIS — I10 ESSENTIAL HYPERTENSION: ICD-10-CM

## 2021-08-24 DIAGNOSIS — E66.01 MORBID OBESITY (HCC): ICD-10-CM

## 2021-08-24 DIAGNOSIS — E11.9 TYPE 2 DIABETES MELLITUS WITHOUT COMPLICATION, WITHOUT LONG-TERM CURRENT USE OF INSULIN (HCC): Primary | ICD-10-CM

## 2021-08-24 PROCEDURE — 3074F SYST BP LT 130 MM HG: CPT | Performed by: FAMILY MEDICINE

## 2021-08-24 PROCEDURE — 3078F DIAST BP <80 MM HG: CPT | Performed by: FAMILY MEDICINE

## 2021-08-24 PROCEDURE — 3008F BODY MASS INDEX DOCD: CPT | Performed by: FAMILY MEDICINE

## 2021-08-24 PROCEDURE — 3725F SCREEN DEPRESSION PERFORMED: CPT | Performed by: FAMILY MEDICINE

## 2021-08-24 PROCEDURE — 1036F TOBACCO NON-USER: CPT | Performed by: FAMILY MEDICINE

## 2021-08-24 PROCEDURE — 99214 OFFICE O/P EST MOD 30 MIN: CPT | Performed by: FAMILY MEDICINE

## 2021-08-29 NOTE — PROGRESS NOTES
Assessment/Plan:      Patient's blood pressure today is quite stable  A1c is much better going down from 8 0 down to 6 8  Now can go to q 6 months visits  Continue same diabetic regimen at this time  Continue ARB current dose of levothyroxine  Patient wants no medication with regards to diabetes  This is going to be dietarily controlled  Could not tolerate metformin  Did discuss the role of adding and GLP and hopefully she will consider this within the next visit her to  Reviewed with her Trulicity and primary prevention guidelines  Reviewed with her AACE  Guidelines  Recommend update gyn examination  Recommend flu shot in the fall  COVID vaccine up-to-date  Recommend yearly diabetic eye and foot examination     Diagnoses and all orders for this visit:    Type 2 diabetes mellitus without complication, without long-term current use of insulin ([address] )    Essential hypertension    Morbid obesity ([address] )    Mixed hyperlipidemia    Hypothyroidism due to Hashimoto's thyroiditis        1  Type 2 diabetes mellitus without complication, without long-term current use of insulin ([address] )     2  Essential hypertension     3  Morbid obesity ([address] )     4  Mixed hyperlipidemia     5  Hypothyroidism due to Hashimoto's thyroiditis         Subjective:        Patient ID: Manus Peabody is a 42 y o  female  Chief Complaint   Patient presents with    Follow-up     3 Month         21-year-old  Morbidly obese white female here today for recheck of labs and blood pressure  No new complaint  States she has been watching her diet  She and her  just moved to [facility]  May need new family doctor      The following portions of the patient's history were reviewed and updated as appropriate: past medical history, past surgical history and problem list       Review of Systems   Constitutional: Negative for appetite change, fatigue, fever and unexpected weight change     HENT: Negative for congestion, ear pain, postnasal drip, rhinorrhea, sinus pressure, sinus pain and sore throat  Eyes: Negative for redness and visual disturbance  Respiratory: Negative for chest tightness and shortness of breath  Cardiovascular: Negative for chest pain, palpitations and leg swelling  Gastrointestinal: Negative for abdominal distention, abdominal pain, diarrhea and nausea  Endocrine: Negative for cold intolerance and heat intolerance  Genitourinary: Negative for dysuria and hematuria  Musculoskeletal: Negative for arthralgias, gait problem and myalgias  Skin: Negative for pallor and rash  Neurological: Negative for dizziness and headaches  Psychiatric/Behavioral: Negative for behavioral problems  The patient is not nervous/anxious  Objective:  /76 (BP Location: Left arm, Patient Position: Sitting, Cuff Size: Large)   Pulse 86   Temp (!) 96 7 °F (35 9 °C) (Temporal)   Resp 15   Ht 5' 1" (1 549 m)   Wt 109 kg (239 lb 12 8 oz)   SpO2 97%   BMI 45 31 kg/m²        Physical Exam  Vitals and nursing note reviewed  Constitutional:       General: She is not in acute distress  Appearance: She is obese  She is not diaphoretic  HENT:      Head: Normocephalic and atraumatic  Eyes:      General: No scleral icterus  Conjunctiva/sclera: Conjunctivae normal       Pupils: Pupils are equal, round, and reactive to light  Neck:      Thyroid: No thyromegaly  Cardiovascular:      Rate and Rhythm: Normal rate and regular rhythm  Pulses:           Carotid pulses are 0 on the right side and 0 on the left side  Heart sounds: Normal heart sounds  No murmur heard  Pulmonary:      Effort: Pulmonary effort is normal  No respiratory distress  Breath sounds: Normal breath sounds  No wheezing  Abdominal:      General: There is no distension  Musculoskeletal:      Cervical back: Normal range of motion and neck supple  Right lower leg: No edema  Left lower leg: No edema     Lymphadenopathy: Cervical: No cervical adenopathy  Skin:     General: Skin is warm  Coloration: Skin is not pale  Neurological:      General: No focal deficit present  Mental Status: She is alert and oriented to person, place, and time  Cranial Nerves: No cranial nerve deficit  Deep Tendon Reflexes: Reflexes are normal and symmetric  Psychiatric:         Mood and Affect: Mood normal          Behavior: Behavior normal          Thought Content:  Thought content normal          Judgment: Judgment normal

## 2021-10-12 DIAGNOSIS — E11.9 TYPE 2 DIABETES MELLITUS WITHOUT COMPLICATION, WITHOUT LONG-TERM CURRENT USE OF INSULIN (HCC): ICD-10-CM

## 2021-10-14 DIAGNOSIS — E03.8 HYPOTHYROIDISM DUE TO HASHIMOTO'S THYROIDITIS: ICD-10-CM

## 2021-10-14 DIAGNOSIS — E06.3 HYPOTHYROIDISM DUE TO HASHIMOTO'S THYROIDITIS: ICD-10-CM

## 2021-10-14 RX ORDER — LEVOTHYROXINE SODIUM 0.15 MG/1
150 TABLET ORAL
Qty: 90 TABLET | Refills: 2 | Status: SHIPPED | OUTPATIENT
Start: 2021-10-14

## 2021-10-14 RX ORDER — DULAGLUTIDE 0.75 MG/.5ML
0.75 INJECTION, SOLUTION SUBCUTANEOUS WEEKLY
Qty: 2 ML | Refills: 4 | Status: SHIPPED | OUTPATIENT
Start: 2021-10-14 | End: 2022-06-02

## 2022-06-01 DIAGNOSIS — E11.9 TYPE 2 DIABETES MELLITUS WITHOUT COMPLICATION, WITHOUT LONG-TERM CURRENT USE OF INSULIN (HCC): ICD-10-CM

## 2022-06-02 RX ORDER — DULAGLUTIDE 0.75 MG/.5ML
0.75 INJECTION, SOLUTION SUBCUTANEOUS WEEKLY
Qty: 2 ML | Refills: 3 | Status: SHIPPED | OUTPATIENT
Start: 2022-06-02 | End: 2022-06-07 | Stop reason: SDUPTHER

## 2022-06-03 LAB
BUN SERPL-MCNC: 12 MG/DL (ref 7–25)
BUN/CREAT SERPL: ABNORMAL (CALC) (ref 6–22)
CALCIUM SERPL-MCNC: 9 MG/DL (ref 8.6–10.2)
CHLORIDE SERPL-SCNC: 101 MMOL/L (ref 98–110)
CHOLEST SERPL-MCNC: 168 MG/DL
CHOLEST/HDLC SERPL: 5.6 (CALC)
CO2 SERPL-SCNC: 25 MMOL/L (ref 20–32)
CREAT SERPL-MCNC: 0.69 MG/DL (ref 0.5–1.1)
GLUCOSE SERPL-MCNC: 202 MG/DL (ref 65–99)
HBA1C MFR BLD: 7.3 % OF TOTAL HGB
HDLC SERPL-MCNC: 30 MG/DL
LDLC SERPL CALC-MCNC: 103 MG/DL (CALC)
NONHDLC SERPL-MCNC: 138 MG/DL (CALC)
POTASSIUM SERPL-SCNC: 4.4 MMOL/L (ref 3.5–5.3)
SL AMB EGFR AFRICAN AMERICAN: 124 ML/MIN/1.73M2
SL AMB EGFR NON AFRICAN AMERICAN: 107 ML/MIN/1.73M2
SODIUM SERPL-SCNC: 135 MMOL/L (ref 135–146)
TRIGL SERPL-MCNC: 232 MG/DL

## 2022-06-03 PROCEDURE — 3051F HG A1C>EQUAL 7.0%<8.0%: CPT | Performed by: NURSE PRACTITIONER

## 2022-06-06 RX ORDER — NAPROXEN 500 MG/1
500 TABLET ORAL 2 TIMES DAILY
COMMUNITY
Start: 2022-03-12

## 2022-06-07 ENCOUNTER — TELEPHONE (OUTPATIENT)
Dept: ADMINISTRATIVE | Facility: OTHER | Age: 42
End: 2022-06-07

## 2022-06-07 ENCOUNTER — OFFICE VISIT (OUTPATIENT)
Dept: FAMILY MEDICINE CLINIC | Facility: CLINIC | Age: 42
End: 2022-06-07
Payer: COMMERCIAL

## 2022-06-07 VITALS
BODY MASS INDEX: 45.88 KG/M2 | HEART RATE: 77 BPM | WEIGHT: 243 LBS | TEMPERATURE: 96.6 F | OXYGEN SATURATION: 98 % | HEIGHT: 61 IN | DIASTOLIC BLOOD PRESSURE: 70 MMHG | SYSTOLIC BLOOD PRESSURE: 118 MMHG | RESPIRATION RATE: 16 BRPM

## 2022-06-07 DIAGNOSIS — E03.8 HYPOTHYROIDISM DUE TO HASHIMOTO'S THYROIDITIS: ICD-10-CM

## 2022-06-07 DIAGNOSIS — E78.2 MIXED HYPERLIPIDEMIA: ICD-10-CM

## 2022-06-07 DIAGNOSIS — E06.3 HYPOTHYROIDISM DUE TO HASHIMOTO'S THYROIDITIS: ICD-10-CM

## 2022-06-07 DIAGNOSIS — F41.9 ANXIETY: ICD-10-CM

## 2022-06-07 DIAGNOSIS — Z12.31 SCREENING MAMMOGRAM FOR BREAST CANCER: ICD-10-CM

## 2022-06-07 DIAGNOSIS — E66.01 MORBID OBESITY (HCC): ICD-10-CM

## 2022-06-07 DIAGNOSIS — E11.9 TYPE 2 DIABETES MELLITUS WITHOUT COMPLICATION, WITHOUT LONG-TERM CURRENT USE OF INSULIN (HCC): Primary | ICD-10-CM

## 2022-06-07 DIAGNOSIS — I10 ESSENTIAL HYPERTENSION: ICD-10-CM

## 2022-06-07 PROCEDURE — 1036F TOBACCO NON-USER: CPT | Performed by: NURSE PRACTITIONER

## 2022-06-07 PROCEDURE — 3008F BODY MASS INDEX DOCD: CPT | Performed by: NURSE PRACTITIONER

## 2022-06-07 PROCEDURE — 3725F SCREEN DEPRESSION PERFORMED: CPT | Performed by: NURSE PRACTITIONER

## 2022-06-07 PROCEDURE — 99214 OFFICE O/P EST MOD 30 MIN: CPT | Performed by: NURSE PRACTITIONER

## 2022-06-07 PROCEDURE — 3074F SYST BP LT 130 MM HG: CPT | Performed by: NURSE PRACTITIONER

## 2022-06-07 PROCEDURE — 3078F DIAST BP <80 MM HG: CPT | Performed by: NURSE PRACTITIONER

## 2022-06-07 RX ORDER — DULAGLUTIDE 0.75 MG/.5ML
0.75 INJECTION, SOLUTION SUBCUTANEOUS WEEKLY
Qty: 6 ML | Refills: 2 | Status: SHIPPED | OUTPATIENT
Start: 2022-06-07 | End: 2022-07-18 | Stop reason: SDUPTHER

## 2022-06-07 NOTE — LETTER
Procedure Request Form: Cervical Cancer Screening      Date Requested: 22  Patient: Jean Necessary  Patient : 1980   Referring Provider: Raymundo Beltran, DO        Date of Procedure ______________________________       The above patient has informed us that they have completed their   most recent Cervical Cancer Screening at your facility  Please complete   this form and attach all corresponding procedure reports/results  Comments DOS: 2018  ____________________________________________________________________  ____________________________________________________________________  ____________________________________________________________________    Facility Completing Procedure _________________________________________    Form Completed By (print name) _______________________________________      Signature __________________________________________________________      These reports are needed for  compliance  Please fax this completed form and a copy of the procedure report to our office located at [place] [address] as soon as possible to [phone] attention Oneta Race: Phone [phone]    We thank you for your assistance in treating our mutual patient

## 2022-06-07 NOTE — PROGRESS NOTES
Assessment/Plan:   Diagnosis ICD-10-CM Associated Orders   1  Type 2 diabetes mellitus without complication, without long-term current use of insulin (HCC)  E11 9 Dulaglutide (Trulicity) 0 15 UC/1 3WJ SOPN     Hemoglobin A1C   2  Mixed hyperlipidemia  E78 2    3  Essential hypertension  I10 Comprehensive metabolic panel   4  Hypothyroidism due to Hashimoto's thyroiditis  E03 8 T4, free    E06 3 TSH, 3rd generation   5  Anxiety  F41 9    6  Morbid obesity ([address] )  E66 01    7  Screening mammogram for breast cancer  Z12 31 Mammo screening bilateral w 3d & cad       A1C not at goal but she was unable to have 100% adherence to regimen due to Trulicity not being dispensed  Continue with current medications  Trulicity sent to mail order  Complete mammogram    Recheck in 3 months  Complete labs prior  Complete Diabetic eye exam  Stable on Effexor  BP stable  Encouraged healthy diet  Advised to call the office for any worsening of symptoms or no symptom improvement  Patient verbalizes understand and agrees with treatment plan  Diagnoses and all orders for this visit:    Type 2 diabetes mellitus without complication, without long-term current use of insulin ([facility])  -     Dulaglutide (Trulicity) 2 89 WP/8 2MY SOPN; Inject 0 5 mL (0 75 mg total) under the skin once a week  -     Hemoglobin A1C; Future    Mixed hyperlipidemia    Essential hypertension  -     Comprehensive metabolic panel; Future    Hypothyroidism due to Hashimoto's thyroiditis  -     T4, free; Future  -     TSH, 3rd generation; Future    Anxiety    Morbid obesity ([facility] [address] )    Screening mammogram for breast cancer  -     Mammo screening bilateral w 3d & cad; Future    Other orders  -     naproxen (EC NAPROSYN) 500 MG EC tablet; Take 500 mg by mouth 2 (two) times a day (Patient not taking: Reported on 6/7/2022)              Subjective:        Patient ID: Vy Harris is a 36 y o  female    Chief Complaint   Patient presents with    Follow-up     Follow up for diabetes recheck       Here for follow up regarding DM and HTN  Last follow up done August 2021       Labs done for visit today:   Lab Results       Component                Value               Date                       HGBA1C                   7 3 (H)             06/03/2022        (prior was 6 8%)    Lab Results       Component                Value               Date                       SODIUM                   135                 06/03/2022                 K                        4 4                 06/03/2022                 CL                       101                 06/03/2022                 CO2                      25                  06/03/2022                 BUN                      12                  06/03/2022                 CREATININE               0 69                06/03/2022                 GLUC                     202 (H)             06/03/2022                 CALCIUM                  9 0                 06/03/2022            Lab Results       Component                Value               Date                       CHOLESTEROL              168                 06/03/2022                 CHOLESTEROL              146                 08/20/2021                 CHOLESTEROL              164                 08/05/2020            Lab Results       Component                Value               Date                       HDL                      30 (L)              06/03/2022                 HDL                      34 (L)              08/20/2021                 HDL                      35 (L)              08/05/2020            Lab Results       Component                Value               Date                       TRIG                     232 (H)             06/03/2022                 TRIG                     182 (H)             08/20/2021                 TRIG                     210 (H)             08/05/2020            Lab Results       Component                Value               Date [place]                  110                 01/03/2018                 [place]                  120                 01/20/2017                 Yao                  150                 01/15/2016              She states she wasn't getting her Trulicity due to pharmacy error  She took a dose last week and this week  But prior to that, she wasn't on it consistently for a while  She is overall feeling well  The following portions of the patient's history were reviewed and updated as appropriate: allergies, current medications, past family history, past social history and problem list     Review of Systems   Constitutional: Negative for chills and fever  Eyes: Negative for discharge  Respiratory: Negative for shortness of breath  Cardiovascular: Negative for chest pain  Gastrointestinal: Negative for constipation and diarrhea  Genitourinary: Negative for difficulty urinating  Musculoskeletal: Negative for joint swelling  Skin: Negative for rash  Hematological: Negative for adenopathy  Psychiatric/Behavioral: The patient is not nervous/anxious  Objective:  /70   Pulse 77   Temp (!) 96 6 °F (35 9 °C) (Temporal)   Resp 16   Ht 5' 1" (1 549 m)   Wt 110 kg (243 lb)   SpO2 98%   BMI 45 91 kg/m²      Physical Exam  Vitals and nursing note reviewed  Constitutional:       General: She is not in acute distress  Appearance: She is well-developed  She is obese  She is not diaphoretic  HENT:      Head: Normocephalic and atraumatic  Right Ear: External ear normal       Left Ear: External ear normal    Eyes:      General: Lids are normal          Right eye: No discharge  Left eye: No discharge  Conjunctiva/sclera: Conjunctivae normal    Cardiovascular:      Rate and Rhythm: Normal rate and regular rhythm  Heart sounds: No murmur heard  Pulmonary:      Effort: Pulmonary effort is normal  No respiratory distress        Breath sounds: Normal breath sounds  No wheezing  Musculoskeletal:         General: No deformity  Cervical back: Neck supple  Skin:     General: Skin is warm and dry  Neurological:      Mental Status: She is alert and oriented to person, place, and time  Psychiatric:         Speech: Speech normal          Behavior: Behavior normal          Thought Content: Thought content normal          Judgment: Judgment normal            BMI Counseling: Body mass index is 45 91 kg/m²  The BMI is above normal  Nutrition recommendations include limiting drinks that contain sugar and reducing intake of cholesterol  Exercise recommendations include exercising 3-5 times per week and strength training exercises  No pharmacotherapy was ordered  Rationale for BMI follow-up plan is due to patient being overweight or obese  Depression Screening and Follow-up Plan: Patient was screened for depression during today's encounter  They screened negative with a PHQ-2 score of 1          Current Outpatient Medications:     Blood Glucose Monitoring Suppl (ONE TOUCH ULTRA MINI) w/Device KIT, USE AS DIRECTED DX: E11 8, Disp: , Rfl: 0    Dulaglutide (Trulicity) 3 95 LS/6 6QV SOPN, Inject 0 5 mL (0 75 mg total) under the skin once a week, Disp: 6 mL, Rfl: 2    glucose blood (Accu-Chek Samaria Plus) test strip, Test 4 times per day, Disp: 100 each, Rfl: 1    Lancets (ONETOUCH ULTRASOFT) lancets, Use as instructed, Disp: 100 each, Rfl: 0    levothyroxine 150 mcg tablet, TAKE 1 TABLET (150 MCG TOTAL) BY MOUTH DAILY IN THE EARLY MORNING, Disp: 90 tablet, Rfl: 2    Multiple Vitamins-Minerals (MULTIVITAMIN ADULT) CHEW, Chew, Disp: , Rfl:     norethindrone-ethinyl estradiol (JUNEL FE 1/20) 1-20 MG-MCG per tablet, Junel FE 1/20 (28) 1 mg-20 mcg (21)/75 mg (7) tablet  TAKE 1 TABLET BY MOUTH EVERY DAY, Disp: , Rfl:     telmisartan (MICARDIS) 40 mg tablet, TAKE 1 TABLET BY MOUTH EVERY DAY, Disp: 90 tablet, Rfl: 0    venlafaxine (EFFEXOR-XR) 37 5 mg 24 hr capsule, TAKE 1 CAPSULE BY MOUTH DAILY WITH BREAKFAST , Disp: 90 capsule, Rfl: 0    Vitamin D, Cholecalciferol, 1000 UNITS CAPS, Take by mouth , Disp: , Rfl:     naproxen (EC NAPROSYN) 500 MG EC tablet, Take 500 mg by mouth 2 (two) times a day (Patient not taking: Reported on 6/7/2022), Disp: , Rfl:   Allergies   Allergen Reactions    Albuterol      SYNCOPE    Cat Hair Extract     Dog Epithelium     Metformin Diarrhea    Other      ADHESIVE/WELTS    Pollen Extract

## 2022-06-07 NOTE — PATIENT INSTRUCTIONS
Continue with current medications  Trulicity sent to mail order  Complete mammogram      Recheck in 3 months  Complete labs prior  Complete Diabetic eye exam      Please call the office if you are experiencing any worsening of symptoms or no symptom improvement

## 2022-06-07 NOTE — TELEPHONE ENCOUNTER
----- Message from Mauro Schlatter sent at 6/7/2022 10:31 AM EDT -----  Regarding: Cervical screening  06/07/22 10:31 AM    Hello, our patient Frank Hoover has had Pap Smear (HPV) aka Cervical Cancer Screening completed/performed  Please assist in updating the patient chart by pulling the document from the Media Tab  The date of service is 08/13/2018       Thank you,  Luis Miranda  [phone]

## 2022-06-08 NOTE — TELEPHONE ENCOUNTER
Upon review of the In Basket request and the patient's chart, initial outreach has been made via fax, please see Contacts section for details       Thank you  Pablo Grey oral

## 2022-06-17 LAB
LEFT EYE DIABETIC RETINOPATHY: NORMAL
RIGHT EYE DIABETIC RETINOPATHY: NORMAL

## 2022-06-20 NOTE — TELEPHONE ENCOUNTER
Upon review of the In Basket request we were able to locate, review, and update the patient chart as requested for Pap Smear (HPV) aka Cervical Cancer Screening  Any additional questions or concerns should be emailed to the Practice Liaisons via [email or web address] email, please do not reply via In Basket      Thank you  Dmitriy Etienne

## 2022-07-08 ENCOUNTER — HOSPITAL ENCOUNTER (OUTPATIENT)
Dept: MAMMOGRAPHY | Facility: CLINIC | Age: 42
Discharge: HOME | End: 2022-07-08
Payer: COMMERCIAL

## 2022-07-08 VITALS — WEIGHT: 243 LBS | HEIGHT: 61 IN | BODY MASS INDEX: 45.88 KG/M2

## 2022-07-08 DIAGNOSIS — Z12.31 SCREENING MAMMOGRAM FOR BREAST CANCER: ICD-10-CM

## 2022-07-08 PROCEDURE — 77067 SCR MAMMO BI INCL CAD: CPT

## 2022-07-08 PROCEDURE — 77063 BREAST TOMOSYNTHESIS BI: CPT

## 2022-07-18 DIAGNOSIS — E11.9 TYPE 2 DIABETES MELLITUS WITHOUT COMPLICATION, WITHOUT LONG-TERM CURRENT USE OF INSULIN (HCC): ICD-10-CM

## 2022-07-18 RX ORDER — DULAGLUTIDE 0.75 MG/.5ML
0.75 INJECTION, SOLUTION SUBCUTANEOUS WEEKLY
Qty: 2 ML | Refills: 2 | Status: SHIPPED | OUTPATIENT
Start: 2022-07-18 | End: 2022-10-10 | Stop reason: SDUPTHER

## 2022-07-18 NOTE — TELEPHONE ENCOUNTER
Patient is requesting we please send her Trulicity to [facility] in [place]  Her mail order pharmacy contacted her, and it will cost $500 for a 3 month supply  At her local [facility], she is able to use the  discount card and pays $25 for a 1 month supply

## 2022-07-19 NOTE — TELEPHONE ENCOUNTER
Patient scheduled this Thursday with Wolf Daily to review blood work Elidel Counseling: Patient may experience a mild burning sensation during topical application. Elidel is not approved in children less than 2 years of age. There have been case reports of hematologic and skin malignancies in patients using topical calcineurin inhibitors although causality is questionable.

## 2022-08-12 DIAGNOSIS — E03.8 HYPOTHYROIDISM DUE TO HASHIMOTO'S THYROIDITIS: ICD-10-CM

## 2022-08-12 DIAGNOSIS — E06.3 HYPOTHYROIDISM DUE TO HASHIMOTO'S THYROIDITIS: ICD-10-CM

## 2022-08-12 RX ORDER — LEVOTHYROXINE SODIUM 0.15 MG/1
150 TABLET ORAL
Qty: 90 TABLET | Refills: 2 | Status: SHIPPED | OUTPATIENT
Start: 2022-08-12

## 2022-08-15 DIAGNOSIS — F41.9 ANXIETY: ICD-10-CM

## 2022-08-15 DIAGNOSIS — I10 ESSENTIAL HYPERTENSION: ICD-10-CM

## 2022-08-15 RX ORDER — VENLAFAXINE HYDROCHLORIDE 37.5 MG/1
CAPSULE, EXTENDED RELEASE ORAL
Qty: 90 CAPSULE | Refills: 0 | Status: SHIPPED | OUTPATIENT
Start: 2022-08-15

## 2022-08-15 RX ORDER — TELMISARTAN 40 MG/1
TABLET ORAL
Qty: 90 TABLET | Refills: 0 | Status: SHIPPED | OUTPATIENT
Start: 2022-08-15

## 2022-10-10 DIAGNOSIS — E11.9 TYPE 2 DIABETES MELLITUS WITHOUT COMPLICATION, WITHOUT LONG-TERM CURRENT USE OF INSULIN (HCC): ICD-10-CM

## 2022-10-10 RX ORDER — DULAGLUTIDE 0.75 MG/.5ML
0.75 INJECTION, SOLUTION SUBCUTANEOUS WEEKLY
Qty: 2 ML | Refills: 0 | Status: SHIPPED | OUTPATIENT
Start: 2022-10-10

## 2022-11-08 ENCOUNTER — OFFICE VISIT (OUTPATIENT)
Dept: FAMILY MEDICINE CLINIC | Facility: CLINIC | Age: 42
End: 2022-11-08

## 2022-11-08 VITALS
SYSTOLIC BLOOD PRESSURE: 116 MMHG | HEIGHT: 61 IN | RESPIRATION RATE: 16 BRPM | OXYGEN SATURATION: 99 % | DIASTOLIC BLOOD PRESSURE: 74 MMHG | TEMPERATURE: 97 F | WEIGHT: 244 LBS | HEART RATE: 71 BPM | BODY MASS INDEX: 46.07 KG/M2

## 2022-11-08 DIAGNOSIS — E06.3 HYPOTHYROIDISM DUE TO HASHIMOTO'S THYROIDITIS: ICD-10-CM

## 2022-11-08 DIAGNOSIS — R21 RASH: ICD-10-CM

## 2022-11-08 DIAGNOSIS — Z76.89 ENCOUNTER TO ESTABLISH CARE: ICD-10-CM

## 2022-11-08 DIAGNOSIS — E66.01 MORBID OBESITY (HCC): ICD-10-CM

## 2022-11-08 DIAGNOSIS — E78.2 MIXED HYPERLIPIDEMIA: ICD-10-CM

## 2022-11-08 DIAGNOSIS — E55.9 VITAMIN D DEFICIENCY: ICD-10-CM

## 2022-11-08 DIAGNOSIS — E88.81 METABOLIC SYNDROME X: ICD-10-CM

## 2022-11-08 DIAGNOSIS — E03.8 HYPOTHYROIDISM DUE TO HASHIMOTO'S THYROIDITIS: ICD-10-CM

## 2022-11-08 DIAGNOSIS — E11.9 TYPE 2 DIABETES MELLITUS WITHOUT COMPLICATION, WITHOUT LONG-TERM CURRENT USE OF INSULIN (HCC): Primary | ICD-10-CM

## 2022-11-08 PROBLEM — H65.23 BILATERAL CHRONIC SEROUS OTITIS MEDIA: Status: RESOLVED | Noted: 2020-08-13 | Resolved: 2022-11-08

## 2022-11-08 LAB — SL AMB POCT HEMOGLOBIN AIC: 8.7 (ref ?–6.5)

## 2022-11-08 NOTE — PROGRESS NOTES
Name: Carlo Pineda      : 1980      MRN: [id number]  Encounter Provider: SULMA Gracia  Encounter Date: 2022   Encounter department: [address] [place] [address]  Type 2 diabetes mellitus without complication, without long-term current use of insulin (HCC)  Uncontrolled with A1C 8 7  Carb controlled diet  Weight loss  Regular exercise  Will check all labs and may need to adjust medication regime and will make appropriate changes after review of labs  Re-eval in office in one week to reviews  - CBC and differential  - Comprehensive metabolic panel  - Lipid panel  - POCT hemoglobin A1c  2  Hypothyroidism due to Hashimoto's thyroiditis  - TSH, 3rd generation  3  Mixed hyperlipidemia  Heart healthy diet  Check labs  Regular exercise  Weight loss  - CBC and differential  - Comprehensive metabolic panel  - Lipid panel  4  Vitamin D deficiency  - Comprehensive metabolic panel  - Vitamin D 25 hydroxy  5  Morbid obesity ([address] )  Weight loss is recommended to improve overall health  Dietary changes- limit carbohydrates, decrease overall caloric intake, reduce portion sizes, healthier snack choices, limit saturated fats, increase intake of fruits and vegetables, limit junk food  Increase exercise to 3-5 times per week  Consider adding strength exercises to exercise routine  6  Metabolic syndrome X  - CBC and differential  - Comprehensive metabolic panel  - Lipid panel  7  Encounter to establish care  Heart healthy, carbohydrate controlled diet- limit red meat, limit saturated fat, moderate salt intake, limit junk food, etc    Regular exercise  Stress management  Routine labwork and screenings as ordered  - CBC and differential  - Comprehensive metabolic panel  - Lipid panel  - TSH, 3rd generation  - Vitamin D 25 hydroxy  8  Rash  - Ambulatory Referral to Dermatology;  Future    Patient was counseled regarding instructions for management which included: impression/diagnosis, risk/benefits of treatment options, importance of compliance with treatment, risk factor reduction, and prognosis  I have reviewed the instructions with the patient answering all questions and patient verbalized understanding  Subjective      Pt here to establish care  PMH, meds, allergies, SH, FH reviewed  FH: mother- DM, breast cancer, sleep apnea, htn    Maternal GM- breast cancer  , father- afib, htn  Paternal GM- dementia  SH:  and lives with 2 children  Works as paraprofessional at [employer]  Non smoker, social etoh  No recreational drugs  Current medications: reviewed  Current issues include:   Gestational diabetes with pregnancies and then developed DM about 7 years ago  Was on metformin for a long time and then developed GI issues  Changed to trulicity about one year ago  Not seeing endocrine  Took jardiance for about 3 months and had terrible side effects  Does not check blood sugars  Reports that most recent A1C readings were between 6-7  No recent change to diet or weight  Not seeing any specialist currently  Rash bilateral thighs for several months, itchy at times, no pain  Reports got infected a while ago and needed abx  Review of Systems   Constitutional: Negative for fatigue, fever and unexpected weight change  HENT: Negative for congestion, sinus pressure, sinus pain and sore throat  Eyes: Negative for visual disturbance  Respiratory: Negative for cough and shortness of breath  Cardiovascular: Negative for chest pain, palpitations and leg swelling  Gastrointestinal: Negative for abdominal pain, constipation, diarrhea, nausea and vomiting  Endocrine: Negative for polydipsia and polyuria  Genitourinary: Negative for dysuria, frequency and urgency  Musculoskeletal: Negative for arthralgias and myalgias  Skin: Positive for rash  Negative for wound  Neurological: Negative for dizziness and headaches     Hematological: Does not bruise/bleed easily  Psychiatric/Behavioral: Negative for self-injury and suicidal ideas  The patient is nervous/anxious  Current Outpatient Medications on File Prior to Visit   Medication Sig   • Blood Glucose Monitoring Suppl (ONE TOUCH ULTRA MINI) w/Device KIT USE AS DIRECTED DX: E11 8   • glucose blood (Accu-Chek Samaria Plus) test strip Test 4 times per day   • Lancets (ONETOUCH ULTRASOFT) lancets Use as instructed   • levothyroxine 150 mcg tablet TAKE 1 TABLET (150 MCG TOTAL) BY MOUTH DAILY IN THE EARLY MORNING   • Multiple Vitamins-Minerals (MULTIVITAMIN ADULT) CHEW Chew   • telmisartan (MICARDIS) 40 mg tablet TAKE 1 TABLET BY MOUTH EVERY DAY   • Trulicity 9 19 LZ/4 8XK SOPN INJECT 0 5 MILLILITERS UNDER THE SKIN ONE TIME PER WEEK   • venlafaxine (EFFEXOR-XR) 37 5 mg 24 hr capsule TAKE 1 CAPSULE BY MOUTH DAILY WITH BREAKFAST  • Vitamin D, Cholecalciferol, 1000 UNITS CAPS Take by mouth  • [DISCONTINUED] ergocalciferol (ERGOCALCIFEROL) 1 25 MG (90930 UT) capsule ergocalciferol (vitamin D2) 1,250 mcg (50,000 unit) capsule   • [DISCONTINUED] naproxen (EC NAPROSYN) 500 MG EC tablet Take 500 mg by mouth 2 (two) times a day (Patient not taking: Reported on 6/7/2022)   • [DISCONTINUED] norethindrone-ethinyl estradiol (JUNEL FE 1/20) 1-20 MG-MCG per tablet Junel FE 1/20 (28) 1 mg-20 mcg (21)/75 mg (7) tablet   TAKE 1 TABLET BY MOUTH EVERY DAY       Objective   poct A1C 8 7    /74   Pulse 71   Temp (!) 97 °F (36 1 °C) (Temporal)   Resp 16   Ht 5' 1" (1 549 m)   Wt 111 kg (244 lb)   SpO2 99%   BMI 46 10 kg/m²     Physical Exam  Constitutional:       General: She is not in acute distress  Appearance: She is obese  She is not ill-appearing  Neck:      Vascular: No carotid bruit  Cardiovascular:      Rate and Rhythm: Normal rate and regular rhythm  Pulses: no weak pulses          Dorsalis pedis pulses are 2+ on the right side and 2+ on the left side          Posterior tibial pulses are 2+ on the right side and 2+ on the left side  Pulmonary:      Effort: Pulmonary effort is normal  No respiratory distress  Breath sounds: Normal breath sounds  No wheezing or rales  Musculoskeletal:      Cervical back: Normal range of motion  Right lower leg: No edema  Left lower leg: No edema  Feet:      Right foot:      Skin integrity: No ulcer, skin breakdown, erythema, warmth, callus or dry skin  Left foot:      Skin integrity: No ulcer, skin breakdown, erythema, warmth, callus or dry skin  Skin:     General: Skin is warm and dry  Coloration: Skin is not jaundiced or pale  Findings: Rash (scattered, raised papules medial thighs b/l) present  Neurological:      General: No focal deficit present  Mental Status: She is alert and oriented to person, place, and time  Cranial Nerves: No cranial nerve deficit  Sensory: No sensory deficit  Psychiatric:         Mood and Affect: Mood normal          Behavior: Behavior normal          Thought Content: Thought content normal          Judgment: Judgment normal      Patient's shoes and socks removed  Right Foot/Ankle   Right Foot Inspection  Skin Exam: skin normal and skin intact  No dry skin, no warmth, no callus, no erythema, no maceration, no abnormal color, no pre-ulcer, no ulcer and no callus  Toe Exam: ROM and strength within normal limits  Sensory   Monofilament testing: intact    Vascular  Capillary refills: < 3 seconds  The right DP pulse is 2+  The right PT pulse is 2+  Left Foot/Ankle  Left Foot Inspection  Skin Exam: skin normal and skin intact  No dry skin, no warmth, no erythema, no maceration, normal color, no pre-ulcer, no ulcer and no callus  Toe Exam: ROM and strength within normal limits  Sensory   Monofilament testing: intact    Vascular  Capillary refills: < 3 seconds  The left DP pulse is 2+  The left PT pulse is 2+       Assign Risk Category  No deformity present  No loss of protective sensation  No weak pulses  Risk: [place] [address], CRNP

## 2022-11-09 LAB
25(OH)D3 SERPL-MCNC: 29 NG/ML (ref 30–100)
ALBUMIN SERPL-MCNC: 4.1 G/DL (ref 3.6–5.1)
ALBUMIN/GLOB SERPL: 1.5 (CALC) (ref 1–2.5)
ALP SERPL-CCNC: 97 U/L (ref 31–125)
ALT SERPL-CCNC: 14 U/L (ref 6–29)
AST SERPL-CCNC: 12 U/L (ref 10–30)
BASOPHILS # BLD AUTO: 24 CELLS/UL (ref 0–200)
BASOPHILS NFR BLD AUTO: 0.2 %
BILIRUB SERPL-MCNC: 0.4 MG/DL (ref 0.2–1.2)
BUN SERPL-MCNC: 13 MG/DL (ref 7–25)
BUN/CREAT SERPL: ABNORMAL (CALC) (ref 6–22)
CALCIUM SERPL-MCNC: 9.3 MG/DL (ref 8.6–10.2)
CHLORIDE SERPL-SCNC: 99 MMOL/L (ref 98–110)
CHOLEST SERPL-MCNC: 164 MG/DL
CHOLEST/HDLC SERPL: 6.1 (CALC)
CO2 SERPL-SCNC: 24 MMOL/L (ref 20–32)
CREAT SERPL-MCNC: 0.66 MG/DL (ref 0.5–0.99)
EOSINOPHIL # BLD AUTO: 159 CELLS/UL (ref 15–500)
EOSINOPHIL NFR BLD AUTO: 1.3 %
ERYTHROCYTE [DISTWIDTH] IN BLOOD BY AUTOMATED COUNT: 14.3 % (ref 11–15)
GFR/BSA.PRED SERPLBLD CYS-BASED-ARV: 112 ML/MIN/1.73M2
GLOBULIN SER CALC-MCNC: 2.8 G/DL (CALC) (ref 1.9–3.7)
GLUCOSE SERPL-MCNC: 172 MG/DL (ref 65–99)
HCT VFR BLD AUTO: 36 % (ref 35–45)
HDLC SERPL-MCNC: 27 MG/DL
HGB BLD-MCNC: 12.2 G/DL (ref 11.7–15.5)
LDLC SERPL CALC-MCNC: 100 MG/DL (CALC)
LYMPHOCYTES # BLD AUTO: 2806 CELLS/UL (ref 850–3900)
LYMPHOCYTES NFR BLD AUTO: 23 %
MCH RBC QN AUTO: 28.6 PG (ref 27–33)
MCHC RBC AUTO-ENTMCNC: 33.9 G/DL (ref 32–36)
MCV RBC AUTO: 84.3 FL (ref 80–100)
MONOCYTES # BLD AUTO: 683 CELLS/UL (ref 200–950)
MONOCYTES NFR BLD AUTO: 5.6 %
NEUTROPHILS # BLD AUTO: 8528 CELLS/UL (ref 1500–7800)
NEUTROPHILS NFR BLD AUTO: 69.9 %
NONHDLC SERPL-MCNC: 137 MG/DL (CALC)
PLATELET # BLD AUTO: 267 THOUSAND/UL (ref 140–400)
PMV BLD REES-ECKER: 10.6 FL (ref 7.5–12.5)
POTASSIUM SERPL-SCNC: 4.4 MMOL/L (ref 3.5–5.3)
PROT SERPL-MCNC: 6.9 G/DL (ref 6.1–8.1)
RBC # BLD AUTO: 4.27 MILLION/UL (ref 3.8–5.1)
SODIUM SERPL-SCNC: 136 MMOL/L (ref 135–146)
TRIGL SERPL-MCNC: 242 MG/DL
TSH SERPL-ACNC: 1.55 MIU/L
WBC # BLD AUTO: 12.2 THOUSAND/UL (ref 3.8–10.8)

## 2022-11-12 DIAGNOSIS — I10 ESSENTIAL HYPERTENSION: ICD-10-CM

## 2022-11-12 DIAGNOSIS — F41.9 ANXIETY: ICD-10-CM

## 2022-11-14 RX ORDER — TELMISARTAN 40 MG/1
TABLET ORAL
Qty: 90 TABLET | Refills: 0 | Status: SHIPPED | OUTPATIENT
Start: 2022-11-14

## 2022-11-14 RX ORDER — VENLAFAXINE HYDROCHLORIDE 37.5 MG/1
CAPSULE, EXTENDED RELEASE ORAL
Qty: 90 CAPSULE | Refills: 0 | Status: SHIPPED | OUTPATIENT
Start: 2022-11-14

## 2022-11-14 NOTE — TELEPHONE ENCOUNTER
Requested medication(s) are due for refill today: Yes  Patient has already received a courtesy refill: No  Other reason request has been forwarded to provider: routed to wrong pool

## 2022-11-21 ENCOUNTER — TELEPHONE (OUTPATIENT)
Dept: FAMILY MEDICINE CLINIC | Facility: CLINIC | Age: 42
End: 2022-11-21

## 2022-11-21 ENCOUNTER — TELEPHONE (OUTPATIENT)
Dept: ADMINISTRATIVE | Facility: OTHER | Age: 42
End: 2022-11-21

## 2022-11-21 ENCOUNTER — OFFICE VISIT (OUTPATIENT)
Dept: FAMILY MEDICINE CLINIC | Facility: CLINIC | Age: 42
End: 2022-11-21

## 2022-11-21 VITALS
SYSTOLIC BLOOD PRESSURE: 118 MMHG | BODY MASS INDEX: 46.07 KG/M2 | HEART RATE: 78 BPM | WEIGHT: 244 LBS | DIASTOLIC BLOOD PRESSURE: 76 MMHG | TEMPERATURE: 97.2 F | OXYGEN SATURATION: 97 % | HEIGHT: 61 IN | RESPIRATION RATE: 16 BRPM

## 2022-11-21 DIAGNOSIS — Z00.00 ANNUAL PHYSICAL EXAM: Primary | ICD-10-CM

## 2022-11-21 DIAGNOSIS — E11.9 TYPE 2 DIABETES MELLITUS WITHOUT COMPLICATION, WITHOUT LONG-TERM CURRENT USE OF INSULIN (HCC): ICD-10-CM

## 2022-11-21 PROBLEM — E78.1 HYPERTRIGLYCERIDEMIA: Status: ACTIVE | Noted: 2022-11-21

## 2022-11-21 RX ORDER — DULAGLUTIDE 0.75 MG/.5ML
1.5 INJECTION, SOLUTION SUBCUTANEOUS WEEKLY
Qty: 22 ML | Refills: 5 | Status: SHIPPED | OUTPATIENT
Start: 2022-11-21 | End: 2023-05-23 | Stop reason: DRUGHIGH

## 2022-11-21 RX ORDER — DULAGLUTIDE 0.75 MG/.5ML
1.5 INJECTION, SOLUTION SUBCUTANEOUS DAILY
Qty: 22 ML | Refills: 5 | Status: SHIPPED | OUTPATIENT
Start: 2022-11-21 | End: 2022-11-21 | Stop reason: SDUPTHER

## 2022-11-21 NOTE — TELEPHONE ENCOUNTER
Was my error, I meant to increase dose but I put daily instead of weekly  I will send new rx  Please advise pt

## 2022-11-21 NOTE — PATIENT INSTRUCTIONS
Increase Trulicity to 9 6PO daily   Repeat A1C in office in 3 months  Heart healthy, carbohydrate controlled diet- limit red meat, limit saturated fat, moderate salt intake, limit junk food, etc    Increase Vitamin D3 2000 units daily  Recommend adding over the counter fish oil/omega 3, 1000mg, 1-2 capsules daily for elevated triglyerides  Regular exercise  Stress management  Routine labwork and screenings as ordered  Type 2 Diabetes Management for Adults   AMBULATORY CARE:   Type 2 diabetes  is a disease that affects how your body uses glucose (sugar)  Either your body cannot make enough insulin, or it cannot use the insulin correctly  It is important to keep diabetes controlled to prevent damage to your heart, blood vessels, and other organs  Have someone call your local emergency number (911 in the [address] [facility]) if:   You cannot be woken  You have signs of diabetic ketoacidosis:     confusion, fatigue    vomiting    rapid heartbeat    fruity smelling breath    extreme thirst    dry mouth and skin    You have any of the following signs of a heart attack:      Squeezing, pressure, or pain in your chest    You may  also have any of the following:     Discomfort or pain in your back, neck, jaw, stomach, or arm    Shortness of breath    Nausea or vomiting    Lightheadedness or a sudden cold sweat    You have any of the following signs of a stroke:      Numbness or drooping on one side of your face     Weakness in an arm or leg    Confusion or difficulty speaking    Dizziness, a severe headache, or vision loss    Call your doctor or diabetes care team if:   You have a sore or wound that will not heal     You have a change in the amount you urinate  Your blood sugar levels are higher than your target goals  You often have lower blood sugar levels than your target goals  Your skin is red, dry, warm, or swollen  You have trouble coping with diabetes, or you feel anxious or depressed      You have questions or concerns about your condition or care  What you need to know about high blood sugar levels:  High blood sugar levels may not cause any symptoms  You may feel more thirsty or urinate more often than usual  Over time, high blood sugar levels can damage your nerves, blood vessels, tissues, and organs  The following can increase your blood sugar levels:  Large meals or large amounts of carbohydrates at one time    Less physical activity    Stress    Illness    A lower dose of medicine or insulin, or a late dose    What you need to know about low blood sugar levels: You can prevent symptoms such as shakiness, dizziness, irritability, or confusion by preventing your blood sugar levels from going too low  Treat a low blood sugar level right away  Drink 4 ounces of juice or have 1 tube of glucose gel  Check your blood sugar level again 10 to 15 minutes later  When the level goes back to normal, eat a meal or snack to prevent another decrease  Keep glucose gel, raisins, or hard candy with you at all times to treat a low blood sugar level  Your blood sugar level can get too low if you take diabetes medicine or insulin and do not eat enough food  If you use insulin, check your blood sugar level before you exercise  If your blood sugar level is below 100 mg/dL, eat 4 crackers or 2 ounces of raisins, or drink 4 ounces of juice  Check your level every 30 minutes if you exercise more than 1 hour  You may need a snack during or after exercise  What you can do to manage your blood sugar levels:   Check your blood sugar levels as directed and as needed  Several items are available to use to check your levels  You may need to check by testing a drop of blood in a glucose monitor  You may instead be given a continuous glucose monitoring (CGM) device  The device is worn at all times  The CGM checks your blood sugar level every 5 minutes   It sends results to an electronic device such as a smart phone  A CGM can be used with or without an insulin pump  Talk with your provider to find out which method is best for you  The goal for blood sugar levels before meals  is between 80 and 130 mg/dL and 2 hours after eating  is lower than 180 mg/dL  Make healthy food choices  Work with a dietitian to develop a meal plan that works for you and your schedule  A dietitian can help you learn how to eat the right amount of carbohydrates during your meals and snacks  Carbohydrates can raise your blood sugar level if you eat too many at one time  Examples of foods that contain carbohydrates are breads, cereals, rice, pasta, and sweets  Get regular physical activity  Physical activity can help you get to your target blood sugar level goal and manage your weight  Get at least 150 minutes of moderate to vigorous aerobic physical activity each week  Do not miss more than 2 days in a row  Do not sit longer than 30 minutes at a time  Your healthcare provider can help you create an activity plan  The plan can include the best activities for you and can help you build your strength and endurance  Maintain a healthy weight  Ask your healthcare provider what a healthy weight is for you  Ask him or her to help you create a safe weight loss plan if you are overweight  Take your diabetes medicine or insulin as directed  You may need diabetes medicine, insulin, or both to help control your blood sugar levels  Your healthcare provider will teach you how and when to take your diabetes medicine or insulin  You will also be taught about side effects oral diabetes medicine can cause  Insulin may be injected, or given through a pump or pen  You and your care team will discuss which method is best for you  An insulin pump  is an implanted device that gives your insulin 24 hours a day  An insulin pump prevents the need for multiple insulin injections in a day           An insulin pen  is a device prefilled with the right amount of insulin  You and your family members will be taught how to draw up and give insulin  if this is the best method for you  Your education team will also teach you how to dispose of needles and syringes  You will learn how much insulin you need  and when to give it  You will be taught when not to give insulin  You will also be taught what to do if your blood sugar level drops too low  This may happen if you take insulin and do not eat the right amount of carbohydrates  Other things you can do to manage type 2 diabetes:   Wear medical alert identification  Wear medical alert jewelry or carry a card that says you have diabetes  Ask your provider where to get these items  Do not smoke  Nicotine and other chemicals in cigarettes and cigars can cause lung and blood vessel damage  It also makes it more difficult to manage your diabetes  Ask your provider for information if you currently smoke and need help to quit  Do not use e-cigarettes or smokeless tobacco in place of cigarettes or to help you quit  They still contain nicotine  Check your feet each day for cuts, scratches, calluses, or other wounds  Look for redness and swelling, and feel for warmth  Wear shoes that fit well  Check your shoes for rocks or other objects that can hurt your feet  Do not walk barefoot or wear shoes without socks  Wear cotton socks to help keep your feet dry  Ask about vaccines you may need  You have a higher risk for serious illness if you get the flu, pneumonia, COVID-19, or hepatitis  Ask your provider if you should get vaccines to prevent these or other diseases, and when to get the vaccines  Talk to your care team if you become stressed about diabetes care  Sometimes being able to fit diabetes care into your life can cause increased stress  The stress can cause you not to take care of yourself properly  Your care team can help by offering tips about self-care  Your care team may suggest you talk to a mental health provider  The provider can listen and offer help with self-care issues  Follow up with your doctor or diabetes care team as directed: You may need to have blood tests done before your follow-up visit  The test results will show if changes need to be made in your treatment or self-care  Write down your questions so you remember to ask them during your visits  Talk to your provider if you cannot afford your medicine  © Copyright [employer] 2022 Information is for End User's use only and may not be sold, redistributed or otherwise used for commercial purposes  All illustrations and images included in CareNotes® are the copyrighted property of A [employer]  or [facility]en Wagner   The above information is an  only  It is not intended as medical advice for individual conditions or treatments  Talk to your doctor, nurse or pharmacist before following any medical regimen to see if it is safe and effective for you

## 2022-11-21 NOTE — TELEPHONE ENCOUNTER
Patient called she was told by pharmacy her trulicity can not be written out that way  It needs to be 1 5 1x weekly?

## 2022-11-21 NOTE — LETTER
Diabetic Eye Exam Form    Date Requested: 22  Patient: Carlos Robert  Patient : 1980   Referring Provider: SULMA Short      DIABETIC Eye Exam Date _______________________________      Type of Exam MUST be documented for Diabetic Eye Exams  Please CHECK ONE  Retinal Exam       Dilated Retinal Exam       OCT       Optomap-Iris Exam      Fundus Photography       Left Eye - Please check Retinopathy or No Retinopathy        Exam did show retinopathy    Exam did not show retinopathy       Right Eye - Please check Retinopathy or No Retinopathy       Exam did show retinopathy    Exam did not show retinopathy       Comments __________________________________________________________    Practice Providing Exam ______________________________________________    Exam Performed By (print name) _______________________________________      Provider Signature ___________________________________________________      These reports are needed for  compliance  Please fax this completed form and a copy of the Diabetic Eye Exam report to our office located at [place] [address] as soon as possible via [phone] miguel Bustillo: Phone [phone]  We thank you for your assistance in treating our mutual patient

## 2022-11-21 NOTE — TELEPHONE ENCOUNTER
----- Message from [facility], LPN sent at 23/64/5638  8:48 AM EST -----  Regarding: dm eye exam  11/21/22 8:48 AM    Hello, our patient Pratibha Lema has had Diabetic Eye Exam completed/performed  Please assist in updating the patient chart by making an External outreach to [facility] located in [place]   The date of service is 7/2022      Thank you,  melissa SCHULZ

## 2022-11-21 NOTE — LETTER
Diabetic Eye Exam Form    Date Requested: 22  Patient: Debbie Bangura  Patient : 1980   Referring Provider: SULMA Booth      DIABETIC Eye Exam Date _______________________________      Type of Exam MUST be documented for Diabetic Eye Exams  Please CHECK ONE  Retinal Exam       Dilated Retinal Exam       OCT       Optomap-Iris Exam      Fundus Photography       Left Eye - Please check Retinopathy or No Retinopathy        Exam did show retinopathy    Exam did not show retinopathy       Right Eye - Please check Retinopathy or No Retinopathy       Exam did show retinopathy    Exam did not show retinopathy       Comments __________________________________________________________    Practice Providing Exam ______________________________________________    Exam Performed By (print name) _______________________________________      Provider Signature ___________________________________________________      These reports are needed for  compliance  Please fax this completed form and a copy of the Diabetic Eye Exam report to our office located at [place] [address] as soon as possible via [phone] miguel Bowers Batch: Phone [phone]  We thank you for your assistance in treating our mutual patient

## 2022-11-21 NOTE — PROGRESS NOTES
FAMILY PRACTICE HEALTH MAINTENANCE OFFICE VISIT  [facility] - [facility]    NAME: Thea Malone  AGE: 36 y o  SEX: female  : 1980     DATE: 2022    Assessment and Plan     1  Annual physical exam  Heart healthy, carbohydrate controlled diet- limit red meat, limit saturated fat, moderate salt intake, limit junk food, etc    Regular exercise  Stress management  Routine labwork and screenings as ordered  2  Type 2 diabetes mellitus without complication, without long-term current use of insulin ([facility])  Monitor blood sugar and bring diary of readings to next appointment  Carbohydrate controlled, heart healthy diet  Continue diabetic medications as ordered  Increase regular exercise: Consider such things as walking, biking, strength training , etc    - Dulaglutide (Trulicity) 0 11 HN/2 4KJ SOPN; Inject 1 mL (1 5 mg total) under the skin in the morning  Dispense: 22 mL; Refill: 5      · Patient Counseling:   · Nutrition: Stressed importance of a well balanced diet, moderation of sodium/saturated fat, caloric balance and sufficient intake of fiber  · Exercise: Stressed the importance of regular exercise with a goal of 150 minutes per week  · Dental Health: Discussed daily flossing and brushing and regular dental visits   · Alcohol Use:  Recommended moderation of alcohol intake  · Injury Prevention: Discussed Safety Belts, Safety Helmets, and Smoke Detectors    · Immunizations reviewed: Risks and Benefits discussed  · Discussed benefits of:  Mammogram , Cervical Cancer screening and Screening labs  • BMI Counseling: Body mass index is 46 1 kg/m²  Discussed with patient's BMI with her  The BMI is above normal  Nutrition recommendations include reducing portion sizes, decreasing overall calorie intake, moderation in carbohydrate intake, reducing intake of saturated fat and trans fat and reducing intake of cholesterol   Exercise recommendations include exercising 3-5 times per week  Chief Complaint     Chief Complaint   Patient presents with   • Physical Exam       History of Present Illness     Here for annual exam  DM  On trulicity but reports dose was never increased  Does not check blood sugars  Generally feels well  No recent illness  No other issues or concerns  Well Adult Physical   Patient here for a comprehensive physical exam       Diet and Physical Activity  Diet: low carbohydrate diet  Exercise: infrequently      Depression Screen  PHQ-2/9 Depression Screening    Little interest or pleasure in doing things: 0 - not at all  Feeling down, depressed, or hopeless: 0 - not at all  PHQ-2 Score: 0  PHQ-2 Interpretation: Negative depression screen     Depression Screening Follow-up Plan: Patient's depression screening was positive with a PHQ-2 score of 0    Patient assessed for underlying major depression  They have no active suicidal ideations  Brief counseling provided and recommend additional follow-up/re-evaluation next office visit  General Health  Hearing: Normal:  bilateral  Vision: goes for regular eye exams and most recent eye exam <1 year  Dental: regular dental visits    Reproductive Health  No issues  and Follows with gynecologist      The following portions of the patient's history were reviewed and updated as appropriate: allergies, current medications, past family history, past medical history, past social history, past surgical history and problem list     Review of Systems     Review of Systems   Constitutional: Negative for chills, diaphoresis, fatigue, fever and unexpected weight change  HENT: Negative for congestion, ear pain, sinus pressure, sinus pain and sore throat  Eyes: Negative for visual disturbance  Respiratory: Negative for cough, chest tightness, shortness of breath and wheezing  Cardiovascular: Negative for chest pain, palpitations and leg swelling     Gastrointestinal: Negative for abdominal distention, abdominal pain, diarrhea and nausea  Endocrine: Negative for polydipsia and polyuria  Genitourinary: Negative for dysuria, frequency and urgency  Musculoskeletal: Negative for arthralgias, back pain and myalgias  Skin: Negative for rash and wound  Allergic/Immunologic: Negative for immunocompromised state  Neurological: Negative for dizziness, weakness and headaches  Hematological: Negative for adenopathy  Psychiatric/Behavioral: Negative for dysphoric mood  The patient is not nervous/anxious  All other systems reviewed and are negative        Past Medical History     Past Medical History:   Diagnosis Date   • Abnormal blood chemistry    • Allergic rhinitis    • Antepartum diabetes mellitus    • Bilateral chronic serous otitis media 2020   • Cough    • Disease of thyroid gland     HYPOTHYROID   • Elevated blood pressure reading    • Gastritis    • Generalized anxiety disorder    • History of asthma    • History of diabetes mellitus    • History of dysfunctional uterine bleeding    • History of ear pain     left   • History of edema    • History of gestational diabetes    • History of hyperlipidemia    • History of hypothyroidism    • History of polycystic ovarian syndrome    • History of sore throat    • History of upper respiratory infection    • Migraines    • Morbidly obese (HCC)    • Nasal congestion    • Nonspecific abnormal finding    • Obesity    • Polycystic ovary    • Postsurgical dumping syndrome    • Seasonal allergies    • Sleep apnea     on CPAP   • Sleep difficulties    • Suspected fetal anomaly not found    • Tinnitus        Past Surgical History     Past Surgical History:   Procedure Laterality Date   • ADENOIDECTOMY     • CARDIAC ELECTROPHYSIOLOGY STUDY AND ABLATION     •  SECTION     • CHOLECYSTECTOMY     • FOOT SURGERY     • GALLBLADDER SURGERY     • MOUTH SURGERY     • VA ESOPHAGOGASTRODUODENOSCOPY TRANSORAL DIAGNOSTIC N/A 2016    Procedure: ESOPHAGOGASTRODUODENOSCOPY (EGD); Surgeon: Laura Nguyễn MD;  Location: [facility];   Service: General   • TONSILLECTOMY     • TOOTH EXTRACTION     • WISDOM TOOTH EXTRACTION         Social History     Social History     Socioeconomic History   • Marital status: /Civil Union     Spouse name: None   • Number of children: None   • Years of education: None   • Highest education level: None   Occupational History   • None   Tobacco Use   • Smoking status: Never   • Smokeless tobacco: Never   Vaping Use   • Vaping Use: Never used   Substance and Sexual Activity   • Alcohol use: Yes     Comment: social   • Drug use: No   • Sexual activity: None   Other Topics Concern   • None   Social History Narrative    Daily coffee consumption, 1 cup per day    Exercising regularly    Sexually active     Social Determinants of Health     Financial Resource Strain: Not on file   Food Insecurity: Not on file   Transportation Needs: Not on file   Physical Activity: Not on file   Stress: Not on file   Social Connections: Not on file   Intimate Partner Violence: Not on file   Housing Stability: Not on file       Family History     Family History   Problem Relation Age of Onset   • Heart disease Mother    • Hypertension Mother    • Valvular heart disease Mother    • Breast cancer Mother    • No Known Problems Son    • No Known Problems Daughter    • Breast cancer Maternal Grandmother 76        triple neg   • Diabetes Maternal Grandmother    • Hypertension Maternal Grandmother    • Diabetes Maternal Grandfather    • No Known Problems Paternal Grandmother    • No Known Problems Paternal Grandfather    • No Known Problems Maternal Aunt    • No Known Problems Maternal Aunt    • No Known Problems Paternal Aunt    • No Known Problems Paternal Aunt    • Arthritis Family    • Varicose Veins Family         of lower extremities       Current Medications       Current Outpatient Medications:   •  Blood Glucose Monitoring Suppl (ONE TOUCH ULTRA MINI) w/Device KIT, USE AS DIRECTED DX: E11 8, Disp: , Rfl: 0  •  glucose blood (Accu-Chek Samaria Plus) test strip, Test 4 times per day, Disp: 100 each, Rfl: 1  •  Lancets (ONETOUCH ULTRASOFT) lancets, Use as instructed, Disp: 100 each, Rfl: 0  •  levothyroxine 150 mcg tablet, TAKE 1 TABLET (150 MCG TOTAL) BY MOUTH DAILY IN THE EARLY MORNING, Disp: 90 tablet, Rfl: 2  •  Multiple Vitamins-Minerals (MULTIVITAMIN ADULT) CHEW, Chew, Disp: , Rfl:   •  telmisartan (MICARDIS) 40 mg tablet, TAKE 1 TABLET BY MOUTH EVERY DAY, Disp: 90 tablet, Rfl: 0  •  Trulicity 2 28 QU/0 7CM SOPN, INJECT 0 5 MILLILITERS UNDER THE SKIN ONE TIME PER WEEK, Disp: 22 mL, Rfl: 5  •  venlafaxine (EFFEXOR-XR) 37 5 mg 24 hr capsule, TAKE 1 CAPSULE BY MOUTH DAILY WITH BREAKFAST , Disp: 90 capsule, Rfl: 0  •  Vitamin D, Cholecalciferol, 1000 UNITS CAPS, Take by mouth , Disp: , Rfl:      Allergies     Allergies   Allergen Reactions   • Albuterol      SYNCOPE   • Cat Hair Extract    • Dog Epithelium    • Metformin Diarrhea   • Other      ADHESIVE/WELTS   • Pollen Extract        Objective     Recent Results (from the past 672 hour(s))   POCT hemoglobin A1c    Collection Time: 11/08/22  5:57 PM   Result Value Ref Range    Hemoglobin A1C 8 7 (A) 6 5   CBC and differential    Collection Time: 11/08/22  5:58 PM   Result Value Ref Range    White Blood Cell Count 12 2 (H) 3 8 - 10 8 Thousand/uL    Red Blood Cell Count 4 27 3 80 - 5 10 Million/uL    Hemoglobin 12 2 11 7 - 15 5 g/dL    HCT 36 0 35 0 - 45 0 %    MCV 84 3 80 0 - 100 0 fL    MCH 28 6 27 0 - 33 0 pg    MCHC 33 9 32 0 - 36 0 g/dL    RDW 14 3 11 0 - 15 0 %    Platelet Count 342 844 - 400 Thousand/uL    SL AMB MPV 10 6 7 5 - 12 5 fL    Neutrophils (Absolute) 8,528 (H) 1,500 - 7,800 cells/uL    Lymphocytes (Absolute) 2,806 850 - 3,900 cells/uL    Monocytes (Absolute) 683 200 - 950 cells/uL    Eosinophils (Absolute) 159 15 - 500 cells/uL    Basophils ABS 24 0 - 200 cells/uL    Neutrophils 69 9 % Lymphocytes 23 0 %    Monocytes 5 6 %    Eosinophils 1 3 %    Basophils PCT 0 2 %   Comprehensive metabolic panel    Collection Time: 11/08/22  5:58 PM   Result Value Ref Range    Glucose, Random 172 (H) 65 - 99 mg/dL    BUN 13 7 - 25 mg/dL    Creatinine 0 66 0 50 - 0 99 mg/dL    eGFR 112 > OR = 60 mL/min/1 73m2    SL AMB BUN/CREATININE RATIO NOT APPLICABLE 6 - 22 (calc)    Sodium 136 135 - 146 mmol/L    Potassium 4 4 3 5 - 5 3 mmol/L    Chloride 99 98 - 110 mmol/L    CO2 24 20 - 32 mmol/L    Calcium 9 3 8 6 - 10 2 mg/dL    Protein, Total 6 9 6 1 - 8 1 g/dL    Albumin 4 1 3 6 - 5 1 g/dL    Globulin 2 8 1 9 - 3 7 g/dL (calc)    Albumin/Globulin Ratio 1 5 1 0 - 2 5 (calc)    TOTAL BILIRUBIN 0 4 0 2 - 1 2 mg/dL    Alkaline Phosphatase 97 31 - 125 U/L    AST 12 10 - 30 U/L    ALT 14 6 - 29 U/L   Lipid panel    Collection Time: 11/08/22  5:58 PM   Result Value Ref Range    Total Cholesterol 164 <200 mg/dL    HDL 27 (L) > OR = 50 mg/dL    Triglycerides 242 (H) <150 mg/dL    LDL Calculated 100 (H) mg/dL (calc)    Chol HDLC Ratio 6 1 (H) <5 0 (calc)    Non-HDL Cholesterol 137 (H) <130 mg/dL (calc)   TSH, 3rd generation    Collection Time: 11/08/22  5:58 PM   Result Value Ref Range    TSH 1 55 mIU/L   Vitamin D 25 hydroxy    Collection Time: 11/08/22  5:58 PM   Result Value Ref Range    Vitamin D, 25-Hydroxy, Serum 29 (L) 30 - 100 ng/mL     Reviewed lab/diagnostic results with pt including both normal and abnormal findings  In depth counseling and instructions given  All questions answered during visit  /76   Pulse 78   Temp (!) 97 2 °F (36 2 °C) (Temporal)   Resp 16   Ht 5' 1" (1 549 m)   Wt 111 kg (244 lb)   SpO2 97%   BMI 46 10 kg/m²      Physical Exam  Constitutional:       General: She is not in acute distress  Appearance: She is well-developed and well-nourished  She is obese  She is not ill-appearing  HENT:      Head: Normocephalic and atraumatic        Right Ear: Tympanic membrane and ear canal normal       Left Ear: Tympanic membrane and ear canal normal       Nose: No congestion  Mouth/Throat:      Mouth: Oropharynx is clear and moist  Mucous membranes are moist       Pharynx: Oropharynx is clear  Eyes:      General: No scleral icterus  Extraocular Movements: Extraocular movements intact and EOM normal       Pupils: Pupils are equal, round, and reactive to light  Neck:      Thyroid: No thyromegaly  Vascular: No carotid bruit  Cardiovascular:      Rate and Rhythm: Normal rate and regular rhythm  Heart sounds: Normal heart sounds  No murmur heard  Pulmonary:      Effort: Pulmonary effort is normal  No respiratory distress  Breath sounds: Normal breath sounds  No wheezing or rales  Abdominal:      General: Bowel sounds are normal  There is no distension  Palpations: Abdomen is soft  Tenderness: There is no abdominal tenderness  Musculoskeletal:         General: No edema  Normal range of motion  Cervical back: Normal range of motion and neck supple  Lymphadenopathy:      Cervical: No cervical adenopathy  Skin:     General: Skin is warm and dry  Coloration: Skin is not jaundiced or pale  Neurological:      General: No focal deficit present  Mental Status: She is alert and oriented to person, place, and time  Cranial Nerves: No cranial nerve deficit  Sensory: No sensory deficit  Psychiatric:         Mood and Affect: Mood and affect and mood normal          Behavior: Behavior normal          Thought Content:  Thought content normal          Judgment: Judgment normal            Vision Screening    Right eye Left eye Both eyes   Without correction 20/15 20/20 20/15   With correction      Comments: Color pass          Rick Krishna

## 2022-11-23 NOTE — TELEPHONE ENCOUNTER
Upon review of the In Basket request and the patient's chart, initial outreach has been made via fax to facility  Please see Contacts section for details       Thank you  Ajit Collins MA

## 2023-02-04 DIAGNOSIS — I10 ESSENTIAL HYPERTENSION: ICD-10-CM

## 2023-02-06 RX ORDER — TELMISARTAN 40 MG/1
TABLET ORAL
Qty: 90 TABLET | Refills: 1 | Status: SHIPPED | OUTPATIENT
Start: 2023-02-06

## 2023-02-13 DIAGNOSIS — F41.9 ANXIETY: ICD-10-CM

## 2023-02-13 RX ORDER — VENLAFAXINE HYDROCHLORIDE 37.5 MG/1
CAPSULE, EXTENDED RELEASE ORAL
Qty: 90 CAPSULE | Refills: 0 | Status: SHIPPED | OUTPATIENT
Start: 2023-02-13

## 2023-02-21 ENCOUNTER — OFFICE VISIT (OUTPATIENT)
Dept: FAMILY MEDICINE CLINIC | Facility: CLINIC | Age: 43
End: 2023-02-21

## 2023-02-21 VITALS
BODY MASS INDEX: 46.07 KG/M2 | HEART RATE: 84 BPM | RESPIRATION RATE: 16 BRPM | DIASTOLIC BLOOD PRESSURE: 74 MMHG | HEIGHT: 61 IN | TEMPERATURE: 97.1 F | WEIGHT: 244 LBS | OXYGEN SATURATION: 98 % | SYSTOLIC BLOOD PRESSURE: 122 MMHG

## 2023-02-21 DIAGNOSIS — E78.2 MIXED HYPERLIPIDEMIA: ICD-10-CM

## 2023-02-21 DIAGNOSIS — E03.8 HYPOTHYROIDISM DUE TO HASHIMOTO'S THYROIDITIS: ICD-10-CM

## 2023-02-21 DIAGNOSIS — E11.9 TYPE 2 DIABETES MELLITUS WITHOUT COMPLICATION, WITHOUT LONG-TERM CURRENT USE OF INSULIN (HCC): Primary | ICD-10-CM

## 2023-02-21 DIAGNOSIS — R22.1 LOCALIZED SWELLING, MASS AND LUMP, NECK: ICD-10-CM

## 2023-02-21 DIAGNOSIS — E06.3 HYPOTHYROIDISM DUE TO HASHIMOTO'S THYROIDITIS: ICD-10-CM

## 2023-02-21 DIAGNOSIS — E55.9 VITAMIN D DEFICIENCY: ICD-10-CM

## 2023-02-21 LAB
CREAT UR-MCNC: 193 MG/DL
MICROALBUMIN UR-MCNC: 12.7 MG/L (ref 0–20)
MICROALBUMIN/CREAT 24H UR: 7 MG/G CREATININE (ref 0–30)

## 2023-02-21 NOTE — PROGRESS NOTES
Name: Sam Fisher      : 1980      MRN: [id number]  Encounter Provider: SULMA Shah  Encounter Date: 2023   Encounter department: [address] [place] [address]  Type 2 diabetes mellitus without complication, without long-term current use of insulin (HCC)  Carbohydrate controlled, heart healthy diet  Continue diabetic medications as ordered  Increase regular exercise: Consider such things as walking, biking, strength training , etc    - Hemoglobin A1c (w/out EAG) ([facility] ONLY)  - Comprehensive metabolic panel; Future  - Hemoglobin A1c (w/out EAG) ([facility] ONLY); Future  - Lipid Panel with Direct LDL reflex; Future    2  Mixed hyperlipidemia  Heart healthy diet  - Comprehensive metabolic panel; Future  - Lipid Panel with Direct LDL reflex; Future    3  Hypothyroidism due to Hashimoto's thyroiditis  Will check tsh in 3 months  Continue current dose of thyroid medication  Will check ultrasound to rule out nodules as pt noticed swelling/lump to neck  - Comprehensive metabolic panel; Future  - TSH, 3rd generation with Free T4 reflex; Future  - US thyroid; Future    4  Vitamin D deficiency  Continue otc supplementation  - Comprehensive metabolic panel; Future  - - Vitamin D 1,25 dihydroxy; Future    5  Localized swelling, mass and lump, neck  ? Nodule  Will check ultrasound  - US thyroid; Future    Patient was counseled regarding instructions for management which included: impression/diagnosis, risk/benefits of treatment options, importance of compliance with treatment, risk factor reduction, and prognosis  I have reviewed the instructions with the patient answering all questions and patient verbalized understanding  Subjective      Here for DM fu  Accompanied by   Seen in office on  and Trulicity dose was increased to 1 5mg weekly  No side effects from increase in trulicity     Has not been checking blood sugars  Feels fine  Noticed "lump on right side of neck"  Not painful  No sore throat  Review of Systems   Constitutional: Negative for activity change, appetite change and unexpected weight change  HENT: Negative for sore throat and voice change  Respiratory: Negative for chest tightness and shortness of breath  Cardiovascular: Negative for chest pain and palpitations  Gastrointestinal: Negative for abdominal pain, diarrhea, nausea and vomiting  Endocrine: Negative for polydipsia and polyuria  Neurological: Negative for dizziness and headaches  Current Outpatient Medications on File Prior to Visit   Medication Sig   • Blood Glucose Monitoring Suppl (ONE TOUCH ULTRA MINI) w/Device KIT USE AS DIRECTED DX: E11 8   • Dulaglutide (Trulicity) 1 87 HJ/9 1DX SOPN Inject 1 mL (1 5 mg total) under the skin once a week   • glucose blood (Accu-Chek Samaria Plus) test strip Test 4 times per day   • Lancets (ONETOUCH ULTRASOFT) lancets Use as instructed   • levothyroxine 150 mcg tablet TAKE 1 TABLET (150 MCG TOTAL) BY MOUTH DAILY IN THE EARLY MORNING   • MAGNESIUM PO Take by mouth daily   • Multiple Vitamins-Minerals (MULTIVITAMIN ADULT) CHEW Chew   • telmisartan (MICARDIS) 40 mg tablet TAKE 1 TABLET BY MOUTH EVERY DAY   • venlafaxine (EFFEXOR-XR) 37 5 mg 24 hr capsule TAKE 1 CAPSULE BY MOUTH DAILY WITH BREAKFAST  • Vitamin D, Cholecalciferol, 1000 UNITS CAPS Take by mouth  Objective   poct A1C:     /74   Pulse 84   Temp (!) 97 1 °F (36 2 °C) (Temporal)   Resp 16   Ht 5' 1" (1 549 m)   Wt 111 kg (244 lb)   SpO2 98%   BMI 46 10 kg/m²     Physical Exam  Vitals reviewed  Constitutional:       General: She is not in acute distress  Appearance: She is obese  She is not ill-appearing  Neck:      Vascular: No carotid bruit  Comments: Slightly enlarged palpable area to left neck, ? Thyroid nodule  Cardiovascular:      Rate and Rhythm: Normal rate and regular rhythm     Pulmonary:      Effort: Pulmonary effort is normal  No respiratory distress  Breath sounds: Normal breath sounds  No wheezing or rales  Musculoskeletal:      Cervical back: Normal range of motion  Right lower leg: No edema  Left lower leg: No edema  Skin:     General: Skin is warm and dry  Coloration: Skin is not jaundiced or pale  Neurological:      General: No focal deficit present  Mental Status: She is alert and oriented to person, place, and time  Cranial Nerves: No cranial nerve deficit  Sensory: No sensory deficit  Psychiatric:         Mood and Affect: Mood normal          Behavior: Behavior normal          Thought Content:  Thought content normal          Judgment: Judgment normal        Shasha Chaudhry

## 2023-02-21 NOTE — PATIENT INSTRUCTIONS
Continue same medications  Will check A1C and will call with results  Medication will be adjusted if needed  Thyroid ultrasound to be scheduled  Will check routine labs in 3 months

## 2023-02-22 LAB — HBA1C MFR BLD: 7.4 % OF TOTAL HGB

## 2023-04-07 ENCOUNTER — HOSPITAL ENCOUNTER (OUTPATIENT)
Dept: ULTRASOUND IMAGING | Facility: CLINIC | Age: 43
Discharge: HOME | End: 2023-04-07

## 2023-04-07 ENCOUNTER — APPOINTMENT (OUTPATIENT)
Dept: RADIOLOGY | Facility: CLINIC | Age: 43
End: 2023-04-07

## 2023-04-07 ENCOUNTER — OFFICE VISIT (OUTPATIENT)
Dept: FAMILY MEDICINE CLINIC | Facility: CLINIC | Age: 43
End: 2023-04-07

## 2023-04-07 VITALS
RESPIRATION RATE: 16 BRPM | OXYGEN SATURATION: 98 % | HEIGHT: 61 IN | HEART RATE: 74 BPM | DIASTOLIC BLOOD PRESSURE: 78 MMHG | SYSTOLIC BLOOD PRESSURE: 124 MMHG | WEIGHT: 244 LBS | BODY MASS INDEX: 46.07 KG/M2 | TEMPERATURE: 98 F

## 2023-04-07 DIAGNOSIS — R19.7 DIARRHEA, UNSPECIFIED TYPE: ICD-10-CM

## 2023-04-07 DIAGNOSIS — E06.3 HYPOTHYROIDISM DUE TO HASHIMOTO'S THYROIDITIS: ICD-10-CM

## 2023-04-07 DIAGNOSIS — R19.7 DIARRHEA, UNSPECIFIED TYPE: Primary | ICD-10-CM

## 2023-04-07 DIAGNOSIS — R11.0 NAUSEA: ICD-10-CM

## 2023-04-07 DIAGNOSIS — R22.1 LOCALIZED SWELLING, MASS AND LUMP, NECK: ICD-10-CM

## 2023-04-07 DIAGNOSIS — E03.8 HYPOTHYROIDISM DUE TO HASHIMOTO'S THYROIDITIS: ICD-10-CM

## 2023-04-07 RX ORDER — ONDANSETRON 4 MG/1
4 TABLET, FILM COATED ORAL EVERY 8 HOURS PRN
Qty: 20 TABLET | Refills: 0 | Status: SHIPPED | OUTPATIENT
Start: 2023-04-07

## 2023-04-07 NOTE — PROGRESS NOTES
Name: Zenaida Butler      : 1980      MRN: [id number]  Encounter Provider: SULMA Scott  Encounter Date: 2023   Encounter department: [address] [place] [address]  Diarrhea, unspecified type  Will check labs and xray  Stool culture ordered, bring specimen to lab when available  Zofran as needed for nausea  Follow the BRAT (bananas, rice, applesauce, tea, toast) diet until symptoms resolve  This may take several days  Avoid all dairy products until symptoms resolve and for 5 days afterwards  Make sure to stay well hydrated  Call or return to office if symptoms worsen or if new symptoms develop  - Stool culture; Future  - CBC and differential  - XR abdomen 1 view kub; Future  - Lipase    2  Nausea  - CBC and differential  - XR abdomen 1 view kub; Future  - Lipase  - ondansetron (ZOFRAN) 4 mg tablet; Take 1 tablet (4 mg total) by mouth every 8 (eight) hours as needed for nausea or vomiting  Dispense: 20 tablet; Refill: 0      Patient was counseled regarding instructions for management which included: impression/diagnosis, risk/benefits of treatment options, importance of compliance with treatment, risk factor reduction, and prognosis  I have reviewed the instructions with the patient answering all questions and patient verbalized understanding  Subjective      Here for diarrhea and nausea  Symptoms for 2 weeks  No vomiting  No fever  No abd pain  Still having liquid stools  Yesterday 3 liquid stools  Feels very gassy  Intermittent abd cramping  No sick contacts  No one else in house with same symptoms  Eating normally  Trying to eat more fiber/vegetables  Does not really eat any dairy  No recent travel  No recent abx use  Review of Systems   Constitutional: Negative for fever  Gastrointestinal: Positive for diarrhea and nausea  Negative for abdominal pain and vomiting  Endocrine: Negative for polydipsia     Genitourinary: Negative "for dysuria  Skin: Negative for color change and pallor  Current Outpatient Medications on File Prior to Visit   Medication Sig   • Blood Glucose Monitoring Suppl (ONE TOUCH ULTRA MINI) w/Device KIT USE AS DIRECTED DX: E11 8   • Dulaglutide (Trulicity) 4 74 JK/6 8YK SOPN Inject 1 mL (1 5 mg total) under the skin once a week   • glucose blood (Accu-Chek Samaria Plus) test strip Test 4 times per day   • Lancets (ONETOUCH ULTRASOFT) lancets Use as instructed   • levothyroxine 150 mcg tablet TAKE 1 TABLET (150 MCG TOTAL) BY MOUTH DAILY IN THE EARLY MORNING   • MAGNESIUM PO Take 150 mg by mouth daily   • Multiple Vitamins-Minerals (MULTIVITAMIN ADULT) CHEW Chew   • telmisartan (MICARDIS) 40 mg tablet TAKE 1 TABLET BY MOUTH EVERY DAY   • venlafaxine (EFFEXOR-XR) 37 5 mg 24 hr capsule TAKE 1 CAPSULE BY MOUTH DAILY WITH BREAKFAST  • Vitamin D, Cholecalciferol, 1000 UNITS CAPS Take by mouth  Objective     /78   Pulse 74   Temp 98 °F (36 7 °C) (Temporal)   Resp 16   Ht 5' 1\" (1 549 m)   Wt 111 kg (244 lb)   SpO2 98%   BMI 46 10 kg/m²     Physical Exam  Constitutional:       General: She is not in acute distress  Appearance: She is well-developed  She is obese  She is not ill-appearing  Cardiovascular:      Rate and Rhythm: Normal rate and regular rhythm  Heart sounds: Normal heart sounds  Pulmonary:      Effort: Pulmonary effort is normal  No respiratory distress  Breath sounds: Normal breath sounds  Abdominal:      General: Bowel sounds are normal  There is no distension  Palpations: Abdomen is soft  Tenderness: There is no abdominal tenderness  There is no guarding or rebound  Musculoskeletal:      Cervical back: Normal range of motion  Skin:     General: Skin is warm and dry  Coloration: Skin is not jaundiced  Findings: No rash  Neurological:      General: No focal deficit present        Mental Status: She is alert and oriented to person, place, and " time    Psychiatric:         Mood and Affect: Mood normal          Behavior: Behavior normal          Thought Content:  Thought content normal          Judgment: Judgment normal        Maylon Goldmann

## 2023-04-07 NOTE — PATIENT INSTRUCTIONS
Will check labs and xray  Stool culture ordered, bring specimen to lab when available  Zofran as needed for nausea  Follow the BRAT (bananas, rice, applesauce, tea, toast) diet until symptoms resolve  This may take several days  Avoid all dairy products until symptoms resolve and for 5 days afterwards  Make sure to stay well hydrated  Call or return to office if symptoms worsen or if new symptoms develop

## 2023-04-08 LAB
BASOPHILS # BLD AUTO: 19 CELLS/UL (ref 0–200)
BASOPHILS NFR BLD AUTO: 0.2 %
EOSINOPHIL # BLD AUTO: 173 CELLS/UL (ref 15–500)
EOSINOPHIL NFR BLD AUTO: 1.8 %
ERYTHROCYTE [DISTWIDTH] IN BLOOD BY AUTOMATED COUNT: 14.1 % (ref 11–15)
HCT VFR BLD AUTO: 34.7 % (ref 35–45)
HGB BLD-MCNC: 11.7 G/DL (ref 11.7–15.5)
LIPASE SERPL-CCNC: 31 U/L (ref 7–60)
LYMPHOCYTES # BLD AUTO: 2314 CELLS/UL (ref 850–3900)
LYMPHOCYTES NFR BLD AUTO: 24.1 %
MCH RBC QN AUTO: 27.9 PG (ref 27–33)
MCHC RBC AUTO-ENTMCNC: 33.7 G/DL (ref 32–36)
MCV RBC AUTO: 82.8 FL (ref 80–100)
MONOCYTES # BLD AUTO: 499 CELLS/UL (ref 200–950)
MONOCYTES NFR BLD AUTO: 5.2 %
NEUTROPHILS # BLD AUTO: 6595 CELLS/UL (ref 1500–7800)
NEUTROPHILS NFR BLD AUTO: 68.7 %
PLATELET # BLD AUTO: 278 THOUSAND/UL (ref 140–400)
PMV BLD REES-ECKER: 10.9 FL (ref 7.5–12.5)
RBC # BLD AUTO: 4.19 MILLION/UL (ref 3.8–5.1)
WBC # BLD AUTO: 9.6 THOUSAND/UL (ref 3.8–10.8)

## 2023-05-10 DIAGNOSIS — E03.8 HYPOTHYROIDISM DUE TO HASHIMOTO'S THYROIDITIS: ICD-10-CM

## 2023-05-10 DIAGNOSIS — E06.3 HYPOTHYROIDISM DUE TO HASHIMOTO'S THYROIDITIS: ICD-10-CM

## 2023-05-10 RX ORDER — LEVOTHYROXINE SODIUM 0.15 MG/1
150 TABLET ORAL
Qty: 90 TABLET | Refills: 2 | Status: SHIPPED | OUTPATIENT
Start: 2023-05-10

## 2023-05-13 DIAGNOSIS — F41.9 ANXIETY: ICD-10-CM

## 2023-05-15 RX ORDER — VENLAFAXINE HYDROCHLORIDE 37.5 MG/1
CAPSULE, EXTENDED RELEASE ORAL
Qty: 90 CAPSULE | Refills: 0 | Status: SHIPPED | OUTPATIENT
Start: 2023-05-15

## 2023-05-18 ENCOUNTER — TELEPHONE (OUTPATIENT)
Dept: FAMILY MEDICINE CLINIC | Facility: CLINIC | Age: 43
End: 2023-05-18

## 2023-05-18 NOTE — TELEPHONE ENCOUNTER
LM for patient to have the blood work completed prior to the appt  Advised patient if blood work is not completed the appt will need to be rescheduled

## 2023-05-23 ENCOUNTER — OFFICE VISIT (OUTPATIENT)
Dept: FAMILY MEDICINE CLINIC | Facility: CLINIC | Age: 43
End: 2023-05-23

## 2023-05-23 VITALS
TEMPERATURE: 97.9 F | WEIGHT: 242 LBS | HEIGHT: 61 IN | DIASTOLIC BLOOD PRESSURE: 74 MMHG | OXYGEN SATURATION: 97 % | BODY MASS INDEX: 45.69 KG/M2 | HEART RATE: 88 BPM | SYSTOLIC BLOOD PRESSURE: 120 MMHG | RESPIRATION RATE: 16 BRPM

## 2023-05-23 DIAGNOSIS — E78.1 HYPERTRIGLYCERIDEMIA: ICD-10-CM

## 2023-05-23 DIAGNOSIS — E03.8 HYPOTHYROIDISM DUE TO HASHIMOTO'S THYROIDITIS: ICD-10-CM

## 2023-05-23 DIAGNOSIS — I10 ESSENTIAL HYPERTENSION: ICD-10-CM

## 2023-05-23 DIAGNOSIS — E78.2 MIXED HYPERLIPIDEMIA: ICD-10-CM

## 2023-05-23 DIAGNOSIS — Z12.31 ENCOUNTER FOR SCREENING MAMMOGRAM FOR MALIGNANT NEOPLASM OF BREAST: ICD-10-CM

## 2023-05-23 DIAGNOSIS — E55.9 VITAMIN D DEFICIENCY: ICD-10-CM

## 2023-05-23 DIAGNOSIS — G47.33 OBSTRUCTIVE SLEEP APNEA SYNDROME: ICD-10-CM

## 2023-05-23 DIAGNOSIS — Z98.890 HISTORY OF RADIOFREQUENCY ABLATION PROCEDURE FOR CARDIAC ARRHYTHMIA: ICD-10-CM

## 2023-05-23 DIAGNOSIS — E11.9 TYPE 2 DIABETES MELLITUS WITHOUT COMPLICATION, WITHOUT LONG-TERM CURRENT USE OF INSULIN (HCC): Primary | ICD-10-CM

## 2023-05-23 DIAGNOSIS — Z86.79 HISTORY OF ATRIAL FLUTTER: ICD-10-CM

## 2023-05-23 DIAGNOSIS — E06.3 HYPOTHYROIDISM DUE TO HASHIMOTO'S THYROIDITIS: ICD-10-CM

## 2023-05-23 RX ORDER — DULAGLUTIDE 1.5 MG/.5ML
INJECTION, SOLUTION SUBCUTANEOUS
COMMUNITY
Start: 2023-05-08

## 2023-05-23 NOTE — PROGRESS NOTES
Name: Balwinder Chaudhry      : 1980      MRN: [id number]  Encounter Provider: SULMA Hair  Encounter Date: 2023   Encounter department: [address] [place] [address]  Type 2 diabetes mellitus without complication, without long-term current use of insulin (HCC)  Controlled with A1C 6 8  carb controlled diet  Will repeat labs in 6 months  Weight loss  Regular exercise  - Comprehensive metabolic panel; Future  - CBC and Platelet; Future  - Lipid Panel with Direct LDL reflex; Future  - Hemoglobin A1c (w/out EAG) ([facility] ONLY); Future  - - Ambulatory Referral to Cardiology; Future    2  Essential hypertension  Stable   Continue blood pressure medications as ordered  Monitor blood pressure  Stress management  Regular exercise  Limit salt in diet  - Comprehensive metabolic panel; Future  - CBC and Platelet; Future  - Ambulatory Referral to Cardiology; Future    3  Mixed hyperlipidemia  Statin recommended, declined  Heart healthy diet  Will check labs in 6 months  Weight loss  Regular exercise  - Comprehensive metabolic panel; Future  - CBC and Platelet; Future  - Lipid Panel with Direct LDL reflex; Future  -  4  Vitamin D deficiency  - Comprehensive metabolic panel; Future  - Comprehensive metabolic panel    5  Hypothyroidism due to Hashimoto's thyroiditis  Stable on current med dose  Monitor    - TSH, 3rd generation with Free T4 reflex; Future    6  Hypertriglyceridemia  Statin recommended, declined  Heart healthy diet  Will check labs in 6 months  Weight loss  Regular exercise  - Comprehensive metabolic panel; Future  - Lipid Panel with Direct LDL reflex; Future  -  7  History of atrial flutter  - Ambulatory Referral to Cardiology; Future    8  History of radiofrequency ablation procedure for cardiac arrhythmia  - Ambulatory Referral to Cardiology; Future    9  Obstructive sleep apnea syndrome  - Ambulatory Referral to Cardiology; Future    10   Encounter "for screening mammogram for malignant neoplasm of breast  - Mammo screening bilateral w 3d & cad; Future      Patient was counseled regarding instructions for management which included: impression/diagnosis, risk/benefits of treatment options, importance of compliance with treatment, risk factor reduction, and prognosis  I have reviewed the instructions with the patient answering all questions and patient verbalized understanding  Subjective      Here for DM and follow up  Does not check blood sugars  On trulicity 0 0BQ weekly  Feels okay  Had episode of chest tightness recently and went to ED  Everything \"okay\" but they recommended stress test  Pt with history of atrial flutter and ablation 2018  Does not have a cardiologist    Taking all  meds as prescribed  Has sleep apnea  Uses cpap  Review of Systems   Constitutional: Negative for fatigue and unexpected weight change  Respiratory: Negative for chest tightness and shortness of breath  Cardiovascular: Negative for chest pain, palpitations and leg swelling  Gastrointestinal: Negative for abdominal pain  Neurological: Negative for dizziness and headaches  Psychiatric/Behavioral: Negative for dysphoric mood  The patient is not nervous/anxious          Current Outpatient Medications on File Prior to Visit   Medication Sig   • Blood Glucose Monitoring Suppl (ONE TOUCH ULTRA MINI) w/Device KIT USE AS DIRECTED DX: E11 8   • glucose blood (Accu-Chek Samaria Plus) test strip Test 4 times per day   • Lancets (ONETOUCH ULTRASOFT) lancets Use as instructed   • levothyroxine 150 mcg tablet TAKE 1 TABLET (150 MCG TOTAL) BY MOUTH DAILY IN THE EARLY MORNING   • MAGNESIUM PO Take 150 mg by mouth daily   • Multiple Vitamins-Minerals (MULTIVITAMIN ADULT) CHEW Chew   • telmisartan (MICARDIS) 40 mg tablet TAKE 1 TABLET BY MOUTH EVERY DAY   • Trulicity 1 5 UO/0 5DW injection INJECT CONTENTS OF 1 PEN ONCE PER WEEK   • venlafaxine (EFFEXOR-XR) 37 5 mg 24 hr " capsule TAKE 1 CAPSULE BY MOUTH DAILY WITH BREAKFAST  • Vitamin D, Cholecalciferol, 1000 UNITS CAPS Take by mouth     • Dulaglutide (Trulicity) 7 51 QUYNH/1 6JE SOPN Inject 1 mL (1 5 mg total) under the skin once a week (Patient not taking: Reported on 5/23/2023)   • [DISCONTINUED] ondansetron (ZOFRAN) 4 mg tablet Take 1 tablet (4 mg total) by mouth every 8 (eight) hours as needed for nausea or vomiting       Objective     Recent Results (from the past 672 hour(s))   Comprehensive metabolic panel    Collection Time: 05/22/23  8:44 AM   Result Value Ref Range    Glucose, Random 144 (H) 65 - 99 mg/dL    BUN 13 7 - 25 mg/dL    Creatinine 0 74 0 50 - 0 99 mg/dL    eGFR 102 > OR = 60 mL/min/1 73m2    SL AMB BUN/CREATININE RATIO NOT APPLICABLE 6 - 22 (calc)    Sodium 137 135 - 146 mmol/L    Potassium 4 4 3 5 - 5 3 mmol/L    Chloride 103 98 - 110 mmol/L    CO2 27 20 - 32 mmol/L    Calcium 9 1 8 6 - 10 2 mg/dL    Protein, Total 6 7 6 1 - 8 1 g/dL    Albumin 4 0 3 6 - 5 1 g/dL    Globulin 2 7 1 9 - 3 7 g/dL (calc)    Albumin/Globulin Ratio 1 5 1 0 - 2 5 (calc)    TOTAL BILIRUBIN 0 5 0 2 - 1 2 mg/dL    Alkaline Phosphatase 94 31 - 125 U/L    AST 11 10 - 30 U/L    ALT 11 6 - 29 U/L   Lipid Panel with Direct LDL reflex    Collection Time: 05/22/23  8:44 AM   Result Value Ref Range    Total Cholesterol 176 <200 mg/dL    HDL 29 (L) > OR = 50 mg/dL    Triglycerides 161 (H) <150 mg/dL    LDL Calculated 119 (H) mg/dL (calc)    Chol HDLC Ratio 6 1 (H) <5 0 (calc)    Non-HDL Cholesterol 147 (H) <130 mg/dL (calc)   TSH, 3rd generation with Free T4 reflex    Collection Time: 05/22/23  8:44 AM   Result Value Ref Range    TSH W/RFX TO FREE T4 1 64 mIU/L   Vitamin D 1,25 dihydroxy    Collection Time: 05/22/23  8:44 AM   Result Value Ref Range    Total 1,25-Dihydroxy,Vitamin D      Vit D, 1,25-Dihydroxy      1,25-Dihydroxy, Vitamin D-2     Hemoglobin A1c (w/out EAG)    Collection Time: 05/22/23  8:44 AM   Result Value Ref Range    Hemoglobin "A1C 6 8 (H) <5 7 % of total Hgb   Reviewed lab/diagnostic results with pt including both normal and abnormal findings  In depth counseling and instructions given  All questions answered during visit  /74   Pulse 88   Temp 97 9 °F (36 6 °C) (Temporal)   Resp 16   Ht 5' 1\" (1 549 m)   Wt 110 kg (242 lb)   SpO2 97%   BMI 45 73 kg/m²     Physical Exam  Vitals reviewed  Constitutional:       General: She is not in acute distress  Appearance: She is obese  She is not ill-appearing  Neck:      Vascular: No carotid bruit  Cardiovascular:      Rate and Rhythm: Normal rate and regular rhythm  Pulmonary:      Effort: Pulmonary effort is normal  No respiratory distress  Breath sounds: Normal breath sounds  No wheezing or rales  Abdominal:      General: There is no distension  Palpations: Abdomen is soft  Musculoskeletal:      Cervical back: Normal range of motion  Right lower leg: No edema  Left lower leg: No edema  Skin:     General: Skin is warm and dry  Coloration: Skin is not jaundiced or pale  Neurological:      General: No focal deficit present  Mental Status: She is alert and oriented to person, place, and time  Cranial Nerves: No cranial nerve deficit  Sensory: No sensory deficit  Psychiatric:         Mood and Affect: Mood normal          Behavior: Behavior normal          Thought Content:  Thought content normal          Judgment: Judgment normal        SULMA Sears  "

## 2023-05-23 NOTE — PATIENT INSTRUCTIONS
Heart healthy, carbohydrate controlled diet- limit red meat, limit saturated fat, moderate salt intake, limit junk food, etc    Regular exercise  Stress management  Routine labwork and screenings as ordered  Continue all same medications  Schedule appt with cardiology for evaluation     Mammogram to be scheduled, to be done after 7/8/2023

## 2023-05-27 LAB
1,25(OH)2D SERPL-MCNC: 33 PG/ML (ref 18–72)
1,25(OH)2D2 SERPL-MCNC: <8 PG/ML
1,25(OH)2D3 SERPL-MCNC: 33 PG/ML
ALBUMIN SERPL-MCNC: 4 G/DL (ref 3.6–5.1)
ALBUMIN/GLOB SERPL: 1.5 (CALC) (ref 1–2.5)
ALP SERPL-CCNC: 94 U/L (ref 31–125)
ALT SERPL-CCNC: 11 U/L (ref 6–29)
AST SERPL-CCNC: 11 U/L (ref 10–30)
BILIRUB SERPL-MCNC: 0.5 MG/DL (ref 0.2–1.2)
BUN SERPL-MCNC: 13 MG/DL (ref 7–25)
BUN/CREAT SERPL: ABNORMAL (CALC) (ref 6–22)
CALCIUM SERPL-MCNC: 9.1 MG/DL (ref 8.6–10.2)
CHLORIDE SERPL-SCNC: 103 MMOL/L (ref 98–110)
CHOLEST SERPL-MCNC: 176 MG/DL
CHOLEST/HDLC SERPL: 6.1 (CALC)
CO2 SERPL-SCNC: 27 MMOL/L (ref 20–32)
CREAT SERPL-MCNC: 0.74 MG/DL (ref 0.5–0.99)
GFR/BSA.PRED SERPLBLD CYS-BASED-ARV: 102 ML/MIN/1.73M2
GLOBULIN SER CALC-MCNC: 2.7 G/DL (CALC) (ref 1.9–3.7)
GLUCOSE SERPL-MCNC: 144 MG/DL (ref 65–99)
HBA1C MFR BLD: 6.8 % OF TOTAL HGB
HDLC SERPL-MCNC: 29 MG/DL
LDLC SERPL CALC-MCNC: 119 MG/DL (CALC)
NONHDLC SERPL-MCNC: 147 MG/DL (CALC)
POTASSIUM SERPL-SCNC: 4.4 MMOL/L (ref 3.5–5.3)
PROT SERPL-MCNC: 6.7 G/DL (ref 6.1–8.1)
SODIUM SERPL-SCNC: 137 MMOL/L (ref 135–146)
TRIGL SERPL-MCNC: 161 MG/DL
TSH SERPL-ACNC: 1.64 MIU/L

## 2023-07-06 NOTE — PROGRESS NOTES
Consultation - Cardiology Office  [facility]. Kraig Trejo 39 y.o. female MRN: [id number]  : 1980  Unit/Bed#:  Encounter: [id number]      ASSESSMENT:  Left-sided chest pain/tightness  Was seen at [address] [place], [place] [address] ED on 2023 and was scheduled/advised for outpatient stress test  Cardiac work-up in the ED was negative    Paroxysmal atrial flutter  Failed flecainide  S/p DCCV  S/p ablation on 9/10/2018 at [address] [place] [address] and metoprolol were discontinued on 10/8/2018  In normal sinus rhythm today    TTE, 2018: At [facility]  EF 50%, moderate LVH mild MR      Morbid obesity, BMI 46.48  Severe ADOLPH, on CPAP    Type 2 diabetes mellitus    Hypothyroidism due to Hashimoto's thyroiditis      RECOMMENDATIONS:  Echocardiogram  Pharmacologic nuclear stress test  Encouraged weight loss          Thank you for your consultation. If you have any question please call me at [phone]      Primary Care Physician Requesting Consult: Bonilla Hall, [address] [place] [address]      Reason for Consult / Principal Problem: Chest pain        HPI :     Kraig Trejo is a 39y.o. year old female who was referred by primary care doctor for evaluation of chest pain/tightness that the patient experienced in May and went to the ED at [facility]  on 2023. Her basic cardiac work-up was unremarkable and she was advised to follow-up with cardiology and have an outpatient stress test.  She is currently feeling better. She describes the pain as a tightness in her chest that radiated to her left shoulder. Review of Systems   Respiratory: Positive for chest tightness. All other systems reviewed and are negative.          Historical Information   Past Medical History:   Diagnosis Date   • Abnormal blood chemistry    • Allergic rhinitis    • Antepartum diabetes mellitus    • Bilateral chronic serous otitis media 2020   • Cough    • Disease of thyroid gland     HYPOTHYROID   • Elevated blood pressure reading    • Gastritis    • Generalized anxiety disorder    • History of asthma    • History of diabetes mellitus    • History of dysfunctional uterine bleeding    • History of ear pain     left   • History of edema    • History of gestational diabetes    • History of hyperlipidemia    • History of hypothyroidism    • History of polycystic ovarian syndrome    • History of sore throat    • History of upper respiratory infection    • Migraines    • Morbidly obese (HCC)    • Nasal congestion    • Nonspecific abnormal finding    • Obesity    • Polycystic ovary    • Postsurgical dumping syndrome    • Seasonal allergies    • Sleep apnea     on CPAP   • Sleep difficulties    • Suspected fetal anomaly not found    • Tinnitus      Past Surgical History:   Procedure Laterality Date   • ADENOIDECTOMY     • CARDIAC ELECTROPHYSIOLOGY STUDY AND ABLATION     •  SECTION     • CHOLECYSTECTOMY     • FOOT SURGERY     • GALLBLADDER SURGERY     • MOUTH SURGERY     • ID ESOPHAGOGASTRODUODENOSCOPY TRANSORAL DIAGNOSTIC N/A 2016    Procedure: ESOPHAGOGASTRODUODENOSCOPY (EGD); Surgeon: Delano Bahena MD;  Location: [facility];   Service: General   • TONSILLECTOMY     • TOOTH EXTRACTION     • WISDOM TOOTH EXTRACTION       Social History     Substance and Sexual Activity   Alcohol Use Yes    Comment: social     Social History     Substance and Sexual Activity   Drug Use No     Social History     Tobacco Use   Smoking Status Never   Smokeless Tobacco Never     Family History:   Family History   Problem Relation Age of Onset   • Heart disease Mother    • Hypertension Mother    • Valvular heart disease Mother    • Breast cancer Mother    • No Known Problems Son    • No Known Problems Daughter    • Breast cancer Maternal Grandmother 76        triple neg   • Diabetes Maternal Grandmother    • Hypertension Maternal Grandmother    • Diabetes Maternal Grandfather    • No Known Problems Paternal Grandmother    • No Known Problems Paternal Grandfather • No Known Problems Maternal Aunt    • No Known Problems Maternal Aunt    • No Known Problems Paternal Aunt    • No Known Problems Paternal Aunt    • Arthritis Family    • Varicose Veins Family         of lower extremities       Meds/Allergies     Allergies   Allergen Reactions   • Jardiance [Empagliflozin] Rash   • Albuterol      SYNCOPE   • Cat Hair Extract    • Dog Epithelium    • Metformin Diarrhea   • Other      ADHESIVE/WELTS   • Pollen Extract        Current Outpatient Medications:   •  Blood Glucose Monitoring Suppl (ONE TOUCH ULTRA MINI) w/Device KIT, USE AS DIRECTED DX: E11.8, Disp: , Rfl: 0  •  glucose blood (Accu-Chek Samaria Plus) test strip, Test 4 times per day, Disp: 100 each, Rfl: 1  •  Lancets (ONETOUCH ULTRASOFT) lancets, Use as instructed, Disp: 100 each, Rfl: 0  •  levothyroxine 150 mcg tablet, TAKE 1 TABLET (150 MCG TOTAL) BY MOUTH DAILY IN THE EARLY MORNING, Disp: 90 tablet, Rfl: 2  •  MAGNESIUM PO, Take 150 mg by mouth daily, Disp: , Rfl:   •  Multiple Vitamins-Minerals (MULTIVITAMIN ADULT) CHEW, Chew, Disp: , Rfl:   •  telmisartan (MICARDIS) 40 mg tablet, TAKE 1 TABLET BY MOUTH EVERY DAY, Disp: 90 tablet, Rfl: 1  •  Trulicity 1.5 PO/9.1ES injection, INJECT CONTENTS OF 1 PEN ONCE PER WEEK, Disp: , Rfl:   •  venlafaxine (EFFEXOR-XR) 37.5 mg 24 hr capsule, TAKE 1 CAPSULE BY MOUTH DAILY WITH BREAKFAST., Disp: 90 capsule, Rfl: 0  •  Vitamin D, Cholecalciferol, 1000 UNITS CAPS, Take by mouth., Disp: , Rfl:     Vitals: Blood pressure 110/64, pulse 76, height 5' 1" (1.549 m), weight 112 kg (246 lb), SpO2 96 %. ?  Body mass index is 46.48 kg/m².   Vitals:    07/07/23 1402   Weight: 112 kg (246 lb)     BP Readings from Last 3 Encounters:   07/07/23 110/64   05/23/23 120/74   04/07/23 124/78       Physical Exam  PHYSICAL EXAMINATION:  Neurologic:  Alert & oriented x 3, no new focal deficits, Not in any acute distress,  Constitutional: Morbidly obese  Eyes:  Pupil equal and reacting to light, conjunctiva normal, No JVP, No LNP   HENT:  Atraumatic, oropharynx moist, Neck- normal range of motion, no tenderness,  Neck supple   Respiratory:  Bilateral air entry, mostly clear to auscultation  Cardiovascular: S1-S2 regular rhythm  GI:  Soft, nondistended, normal bowel sounds, nontender, no hepatosplenomegaly appreciated. Musculoskeletal: no tenderness, no deformities. Skin:  Well hydrated, no rash   Lymphatic:  No lymphadenopathy noted   Extremities:  No edema and distal pulses are present    Diagnostic Studies Review Cardio:      EKG: Normal sinus rhythm, heart rate 76/min, left axis deviation, IRBBB    Cardiac testing:   No results found for this or any previous visit. Imaging:  Chest X-Ray:   No Chest XR results available for this patient. CT-scan of the chest:     No CTA results available for this patient.   Lab Review   Lab Results   Component Value Date    WBC 9.6 04/07/2023    HGB 11.7 04/07/2023    HCT 34.7 (L) 04/07/2023    MCV 82.8 04/07/2023    RDW 14.1 04/07/2023     04/07/2023     BMP:  Lab Results   Component Value Date    SODIUM 137 05/22/2023    K 4.4 05/22/2023     05/22/2023    CO2 27 05/22/2023    ANIONGAP 7 11/08/2014    BUN 13 05/22/2023    CREATININE 0.74 05/22/2023    GLUC 144 (H) 05/22/2023    CALCIUM 9.1 05/22/2023    EGFR 102 05/22/2023    MG 2.0 01/15/2016     LFT:  Lab Results   Component Value Date    AST 11 05/22/2023    ALT 11 05/22/2023    ALKPHOS 94 05/22/2023    TP 6.7 05/22/2023    ALB 4.0 05/22/2023      Lab Results   Component Value Date    LFQ6BHXBDEVO 3.14 01/03/2018     No components found for: "TSH3"  Lab Results   Component Value Date    HGBA1C 6.8 (H) 05/22/2023     Lipid Profile:   Lab Results   Component Value Date    CHOLESTEROL 176 05/22/2023    HDL 29 (L) 05/22/2023    LDLCALC 119 (H) 05/22/2023    TRIG 161 (H) 05/22/2023     Lab Results   Component Value Date    CHOLESTEROL 176 05/22/2023    CHOLESTEROL 164 11/08/2022     Lab Results Component Value Date    TROPONINI <0.02 04/16/2021     No results found for: "NTBNP"   No results found for this or any previous visit (from the past 672 hour(s)). Dr. Bonifacio Breen MD, [facility]      "This note has been constructed using a voice recognition system. Therefore there may be syntax, spelling, and/or grammatical errors.  Please call if you have any questions. "

## 2023-07-07 ENCOUNTER — CONSULT (OUTPATIENT)
Dept: CARDIOLOGY CLINIC | Facility: CLINIC | Age: 43
End: 2023-07-07
Payer: COMMERCIAL

## 2023-07-07 VITALS
DIASTOLIC BLOOD PRESSURE: 64 MMHG | SYSTOLIC BLOOD PRESSURE: 110 MMHG | OXYGEN SATURATION: 96 % | BODY MASS INDEX: 46.44 KG/M2 | HEART RATE: 76 BPM | WEIGHT: 246 LBS | HEIGHT: 61 IN

## 2023-07-07 DIAGNOSIS — Z86.79 HISTORY OF ATRIAL FLUTTER: Primary | ICD-10-CM

## 2023-07-07 DIAGNOSIS — G47.33 OBSTRUCTIVE SLEEP APNEA SYNDROME: ICD-10-CM

## 2023-07-07 DIAGNOSIS — E11.9 TYPE 2 DIABETES MELLITUS WITHOUT COMPLICATION, WITHOUT LONG-TERM CURRENT USE OF INSULIN (HCC): ICD-10-CM

## 2023-07-07 DIAGNOSIS — R07.2 PRECORDIAL PAIN: ICD-10-CM

## 2023-07-07 DIAGNOSIS — Z98.890 HISTORY OF RADIOFREQUENCY ABLATION PROCEDURE FOR CARDIAC ARRHYTHMIA: ICD-10-CM

## 2023-07-07 DIAGNOSIS — I10 ESSENTIAL HYPERTENSION: ICD-10-CM

## 2023-07-07 PROCEDURE — 99203 OFFICE O/P NEW LOW 30 MIN: CPT | Performed by: INTERNAL MEDICINE

## 2023-07-07 PROCEDURE — 93000 ELECTROCARDIOGRAM COMPLETE: CPT | Performed by: INTERNAL MEDICINE

## 2023-07-17 ENCOUNTER — HOSPITAL ENCOUNTER (OUTPATIENT)
Age: 43
Discharge: HOME | End: 2023-07-17
Payer: COMMERCIAL

## 2023-07-17 DIAGNOSIS — Z12.31 ENCOUNTER FOR SCREENING MAMMOGRAM FOR MALIGNANT NEOPLASM OF BREAST: ICD-10-CM

## 2023-07-17 PROCEDURE — 77063 BREAST TOMOSYNTHESIS BI: CPT

## 2023-07-17 PROCEDURE — 77067 SCR MAMMO BI INCL CAD: CPT

## 2023-07-25 ENCOUNTER — HOSPITAL ENCOUNTER (OUTPATIENT)
Dept: RADIOLOGY | Facility: HOSPITAL | Age: 43
Discharge: HOME | End: 2023-07-25
Attending: INTERNAL MEDICINE
Payer: COMMERCIAL

## 2023-07-25 ENCOUNTER — HOSPITAL ENCOUNTER (OUTPATIENT)
Dept: NON INVASIVE DIAGNOSTICS | Facility: HOSPITAL | Age: 43
Discharge: HOME | End: 2023-07-25
Attending: INTERNAL MEDICINE
Payer: COMMERCIAL

## 2023-07-25 ENCOUNTER — TELEPHONE (OUTPATIENT)
Dept: CARDIOLOGY CLINIC | Facility: CLINIC | Age: 43
End: 2023-07-25

## 2023-07-25 VITALS
HEIGHT: 61 IN | SYSTOLIC BLOOD PRESSURE: 152 MMHG | DIASTOLIC BLOOD PRESSURE: 91 MMHG | HEART RATE: 73 BPM | WEIGHT: 246 LBS | BODY MASS INDEX: 46.44 KG/M2

## 2023-07-25 DIAGNOSIS — Z86.79 HISTORY OF ATRIAL FLUTTER: ICD-10-CM

## 2023-07-25 DIAGNOSIS — G47.33 OBSTRUCTIVE SLEEP APNEA SYNDROME: ICD-10-CM

## 2023-07-25 DIAGNOSIS — R07.2 PRECORDIAL PAIN: ICD-10-CM

## 2023-07-25 LAB
AORTIC ROOT: 3.5 CM
APICAL FOUR CHAMBER EJECTION FRACTION: 57 %
AV LVOT PEAK GRADIENT: 5 MMHG
AV PEAK GRADIENT: 11 MMHG
CHEST PAIN STATEMENT: NORMAL
DOP CALC LVOT AREA: 3.14 CM2
DOP CALC LVOT DIAMETER: 2 CM
E WAVE DECELERATION TIME: 196 MS
FRACTIONAL SHORTENING: 31 % (ref 28–44)
INTERVENTRICULAR SEPTUM IN DIASTOLE (PARASTERNAL SHORT AXIS VIEW): 1.2 CM
INTERVENTRICULAR SEPTUM: 1.2 CM (ref 0.6–1.1)
LAAS-AP2: 29 CM2
LAAS-AP4: 24 CM2
LEFT ATRIUM AREA SYSTOLE SINGLE PLANE A4C: 24.4 CM2
LEFT ATRIUM SIZE: 4.2 CM
LEFT ATRIUM VOLUME (MOD BIPLANE): 87 ML
LEFT INTERNAL DIMENSION IN SYSTOLE: 3.7 CM (ref 2.1–4)
LEFT VENTRICULAR INTERNAL DIMENSION IN DIASTOLE: 5.4 CM (ref 3.5–6)
LEFT VENTRICULAR POSTERIOR WALL IN END DIASTOLE: 1.2 CM
LEFT VENTRICULAR STROKE VOLUME: 83 ML
LVSV (TEICH): 83 ML
MAX DIASTOLIC BP: 70 MMHG
MAX HEART RATE: 114 BPM
MAX HR PERCENT: 64 %
MAX HR: 114 BPM
MAX PREDICTED HEART RATE: 176 BPM
MAX. SYSTOLIC BP: 128 MMHG
MV E'TISSUE VEL-LAT: 12 CM/S
MV E'TISSUE VEL-SEP: 11 CM/S
MV PEAK A VEL: 1.07 M/S
MV PEAK E VEL: 106 CM/S
MV STENOSIS PRESSURE HALF TIME: 57 MS
MV VALVE AREA P 1/2 METHOD: 3.86 CM2
PROTOCOL NAME: NORMAL
PV PEAK GRADIENT: 6 MMHG
RATE PRESSURE PRODUCT: NORMAL
REASON FOR TERMINATION: NORMAL
RIGHT ATRIUM AREA SYSTOLE A4C: 15.5 CM2
RIGHT VENTRICLE ID DIMENSION: 3.1 CM
SL CV LEFT ATRIUM LENGTH A2C: 6.5 CM
SL CV LV EF: 55
SL CV PED ECHO LEFT VENTRICLE DIASTOLIC VOLUME (MOD BIPLANE) 2D: 140 ML
SL CV PED ECHO LEFT VENTRICLE SYSTOLIC VOLUME (MOD BIPLANE) 2D: 56 ML
SL CV REST NUCLEAR ISOTOPE DOSE: 15.7 MCI
SL CV STRESS NUCLEAR ISOTOPE DOSE: 43.7 MCI
SL CV STRESS RECOVERY BP: NORMAL MMHG
SL CV STRESS RECOVERY HR: 86 BPM
SL CV STRESS RECOVERY O2 SAT: 98 %
STRESS BASELINE BP: NORMAL MMHG
STRESS BASELINE HR: 83 BPM
STRESS O2 SAT REST: 97 %
STRESS PEAK HR: 109 BPM
STRESS POST ESTIMATED WORKLOAD: 1.6 METS
STRESS POST EXERCISE DUR MIN: 3 MIN
STRESS POST O2 SAT PEAK: 97 %
STRESS POST PEAK BP: 110 MMHG
TARGET HR FORMULA: NORMAL
TEST INDICATION: NORMAL
TIME IN EXERCISE PHASE: NORMAL
TRICUSPID ANNULAR PLANE SYSTOLIC EXCURSION: 2.1 CM

## 2023-07-25 PROCEDURE — A9502 TC99M TETROFOSMIN: HCPCS

## 2023-07-25 PROCEDURE — 93016 CV STRESS TEST SUPVJ ONLY: CPT | Performed by: INTERNAL MEDICINE

## 2023-07-25 PROCEDURE — 93306 TTE W/DOPPLER COMPLETE: CPT

## 2023-07-25 PROCEDURE — 93018 CV STRESS TEST I&R ONLY: CPT | Performed by: INTERNAL MEDICINE

## 2023-07-25 PROCEDURE — 93306 TTE W/DOPPLER COMPLETE: CPT | Performed by: INTERNAL MEDICINE

## 2023-07-25 PROCEDURE — G1004 CDSM NDSC: HCPCS

## 2023-07-25 PROCEDURE — 93017 CV STRESS TEST TRACING ONLY: CPT

## 2023-07-25 PROCEDURE — 78452 HT MUSCLE IMAGE SPECT MULT: CPT

## 2023-07-25 PROCEDURE — 78452 HT MUSCLE IMAGE SPECT MULT: CPT | Performed by: INTERNAL MEDICINE

## 2023-07-25 RX ORDER — REGADENOSON 0.08 MG/ML
0.4 INJECTION, SOLUTION INTRAVENOUS ONCE
Status: COMPLETED | OUTPATIENT
Start: 2023-07-25 | End: 2023-07-25

## 2023-07-25 RX ADMIN — REGADENOSON 0.4 MG: 0.08 INJECTION, SOLUTION INTRAVENOUS at 09:52

## 2023-07-25 NOTE — TELEPHONE ENCOUNTER
----- Message from Whitney Harp MD sent at 7/25/2023  1:03 PM EDT -----  Please inform the patient that the stress test showed NO evidence of any significant blockage in the blood vessels of your heart.

## 2023-07-26 ENCOUNTER — TELEPHONE (OUTPATIENT)
Dept: CARDIOLOGY CLINIC | Facility: CLINIC | Age: 43
End: 2023-07-26

## 2023-07-26 NOTE — TELEPHONE ENCOUNTER
----- Message from Juju Bains MD sent at 7/26/2023  9:07 AM EDT -----  Please call and inform the patient that the Echocardiogram showed normal pumping function of the heart. No significant valve abnormality was seen.   We will discuss further at next scheduled office visit

## 2023-09-16 DIAGNOSIS — E11.9 TYPE 2 DIABETES MELLITUS WITHOUT COMPLICATION, WITHOUT LONG-TERM CURRENT USE OF INSULIN (HCC): Primary | ICD-10-CM

## 2023-09-16 DIAGNOSIS — I10 ESSENTIAL HYPERTENSION: ICD-10-CM

## 2023-09-18 RX ORDER — DULAGLUTIDE 1.5 MG/.5ML
1.5 INJECTION, SOLUTION SUBCUTANEOUS
Qty: 2 ML | Refills: 0 | Status: SHIPPED | OUTPATIENT
Start: 2023-09-18

## 2023-09-18 RX ORDER — TELMISARTAN 40 MG/1
40 TABLET ORAL DAILY
Qty: 90 TABLET | Refills: 0 | Status: SHIPPED | OUTPATIENT
Start: 2023-09-18

## 2023-11-08 DIAGNOSIS — E11.9 TYPE 2 DIABETES MELLITUS WITHOUT COMPLICATION, WITHOUT LONG-TERM CURRENT USE OF INSULIN (HCC): ICD-10-CM

## 2023-11-09 RX ORDER — DULAGLUTIDE 1.5 MG/.5ML
1.5 INJECTION, SOLUTION SUBCUTANEOUS
Qty: 3 ML | Refills: 5 | Status: SHIPPED | OUTPATIENT
Start: 2023-11-09

## 2023-11-16 ENCOUNTER — TELEPHONE (OUTPATIENT)
Dept: FAMILY MEDICINE CLINIC | Facility: CLINIC | Age: 43
End: 2023-11-16

## 2023-11-16 NOTE — TELEPHONE ENCOUNTER
----- Message from Veronica Apple, [facility] sent at 11/16/2023  6:38 AM EST -----  Regarding: labs  Pt has upcoming appt for DM check on 11/28. Needs to have labs done prior to appt. Orders in chart. Please call pt to advise. If labs are not done, appt will need to be re-scheduled. TY  Left message for pt to have labs done prior to appt or will need to be rescheduled.

## 2023-11-21 LAB
25(OH)D3 SERPL-MCNC: 22 NG/ML (ref 30–100)
ALBUMIN SERPL-MCNC: 3.9 G/DL (ref 3.6–5.1)
ALBUMIN/GLOB SERPL: 1.6 (CALC) (ref 1–2.5)
ALP SERPL-CCNC: 90 U/L (ref 31–125)
ALT SERPL-CCNC: 13 U/L (ref 6–29)
AST SERPL-CCNC: 12 U/L (ref 10–30)
BILIRUB SERPL-MCNC: 0.4 MG/DL (ref 0.2–1.2)
BUN SERPL-MCNC: 11 MG/DL (ref 7–25)
BUN/CREAT SERPL: ABNORMAL (CALC) (ref 6–22)
CALCIUM SERPL-MCNC: 8.8 MG/DL (ref 8.6–10.2)
CHLORIDE SERPL-SCNC: 102 MMOL/L (ref 98–110)
CHOLEST SERPL-MCNC: 167 MG/DL
CHOLEST/HDLC SERPL: 6.2 (CALC)
CO2 SERPL-SCNC: 28 MMOL/L (ref 20–32)
CREAT SERPL-MCNC: 0.64 MG/DL (ref 0.5–0.99)
ERYTHROCYTE [DISTWIDTH] IN BLOOD BY AUTOMATED COUNT: 14.2 % (ref 11–15)
GFR/BSA.PRED SERPLBLD CYS-BASED-ARV: 112 ML/MIN/1.73M2
GLOBULIN SER CALC-MCNC: 2.5 G/DL (CALC) (ref 1.9–3.7)
GLUCOSE SERPL-MCNC: 186 MG/DL (ref 65–99)
HCT VFR BLD AUTO: 34.1 % (ref 35–45)
HDLC SERPL-MCNC: 27 MG/DL
HGB BLD-MCNC: 11.5 G/DL (ref 11.7–15.5)
LDLC SERPL CALC-MCNC: 115 MG/DL (CALC)
MCH RBC QN AUTO: 27.9 PG (ref 27–33)
MCHC RBC AUTO-ENTMCNC: 33.7 G/DL (ref 32–36)
MCV RBC AUTO: 82.8 FL (ref 80–100)
NONHDLC SERPL-MCNC: 140 MG/DL (CALC)
PLATELET # BLD AUTO: 245 THOUSAND/UL (ref 140–400)
PMV BLD REES-ECKER: 10.8 FL (ref 7.5–12.5)
POTASSIUM SERPL-SCNC: 4.4 MMOL/L (ref 3.5–5.3)
PROT SERPL-MCNC: 6.4 G/DL (ref 6.1–8.1)
RBC # BLD AUTO: 4.12 MILLION/UL (ref 3.8–5.1)
SODIUM SERPL-SCNC: 137 MMOL/L (ref 135–146)
TRIGL SERPL-MCNC: 139 MG/DL
TSH SERPL-ACNC: 1.95 MIU/L
WBC # BLD AUTO: 8.6 THOUSAND/UL (ref 3.8–10.8)

## 2023-12-05 ENCOUNTER — OFFICE VISIT (OUTPATIENT)
Dept: FAMILY MEDICINE CLINIC | Facility: CLINIC | Age: 43
End: 2023-12-05
Payer: COMMERCIAL

## 2023-12-05 VITALS
RESPIRATION RATE: 16 BRPM | TEMPERATURE: 96.4 F | SYSTOLIC BLOOD PRESSURE: 124 MMHG | HEIGHT: 61 IN | BODY MASS INDEX: 46.63 KG/M2 | HEART RATE: 72 BPM | DIASTOLIC BLOOD PRESSURE: 80 MMHG | WEIGHT: 247 LBS | OXYGEN SATURATION: 97 %

## 2023-12-05 DIAGNOSIS — E11.9 TYPE 2 DIABETES MELLITUS WITHOUT COMPLICATION, WITHOUT LONG-TERM CURRENT USE OF INSULIN (HCC): Primary | ICD-10-CM

## 2023-12-05 DIAGNOSIS — E78.2 MIXED HYPERLIPIDEMIA: ICD-10-CM

## 2023-12-05 DIAGNOSIS — I10 ESSENTIAL HYPERTENSION: ICD-10-CM

## 2023-12-05 DIAGNOSIS — M79.671 PAIN OF RIGHT HEEL: ICD-10-CM

## 2023-12-05 DIAGNOSIS — E55.9 VITAMIN D DEFICIENCY: ICD-10-CM

## 2023-12-05 DIAGNOSIS — E66.01 MORBID OBESITY (HCC): ICD-10-CM

## 2023-12-05 LAB — SL AMB POCT HEMOGLOBIN AIC: 8.1 (ref ?–6.5)

## 2023-12-05 PROCEDURE — 83036 HEMOGLOBIN GLYCOSYLATED A1C: CPT | Performed by: NURSE PRACTITIONER

## 2023-12-05 PROCEDURE — 99214 OFFICE O/P EST MOD 30 MIN: CPT | Performed by: NURSE PRACTITIONER

## 2023-12-05 RX ORDER — ERGOCALCIFEROL 1.25 MG/1
CAPSULE ORAL
COMMUNITY

## 2023-12-05 NOTE — PROGRESS NOTES
Name: Nils Wilks      : 1980      MRN: [id number]  Encounter Provider: SULMA Salvador  Encounter Date: 2023   Encounter department: [address] [place] [address]   1. Type 2 diabetes mellitus without complication, without long-term current use of insulin (HCC)  Uncontrolled. A1C went up from 6.8 to 8. 1. pt does not want to increase medication at this time. Will continue trulicity 1.8AD weekly and recheck labs in 3 months. At that time, if continues to rise, consider increase to 3.0mg weekly. Monitor blood sugar and bring diary of readings to next appointment. Carbohydrate controlled, heart healthy diet. Continue diabetic medications as ordered. Regular exercise  - POCT hemoglobin A1c-8.1  - Comprehensive metabolic panel; Future  - Hemoglobin A1c (w/out EAG) ([facility] ONLY); Future  - Microalbumin, Random Urine (W/Creatinine) ([facility] ONLY); Future    2. Essential hypertension  Stable. Continue blood pressure medications as ordered. Monitor blood pressure. Stress management. Regular exercise  Limit salt in diet. - Comprehensive metabolic panel; Future    3. Mixed hyperlipidemia  Heart healthy diet. Declines statin. Monitor.   - Comprehensive metabolic panel; Future    4. Morbid obesity ([address])  Weight loss is recommended to improve overall health. Dietary changes- limit carbohydrates, decrease overall caloric intake, reduce portion sizes, healthier snack choices, limit saturated fats, increase intake of fruits and vegetables, limit junk food. exercise to 3-5 times per week. Consider adding strength exercises to exercise routine. 5. Vitamin D deficiency  Increase vitamin d3 to 2000 units alternating with 4000 units    6. Pain of right heel  - XR heel / calcaneus 2+ vw right; Future  May need eval by podiatry. Will wait for xray results. Depression Screening and Follow-up Plan: Patient's depression screening was positive with a PHQ-2 score of 3. Their PHQ-9 score was 8. Depression Screening Follow-up Plan: Patient's depression screening was positive with a PHQ-2 score of 3. Their PHQ-9 score was 8. Patient assessed for underlying major depression. They have no active suicidal ideations. Brief counseling provided and recommend additional follow-up/re-evaluation next office visit. Patient was counseled regarding instructions for management which included: impression/diagnosis, risk/benefits of treatment options, importance of compliance with treatment, risk factor reduction, and prognosis. I have reviewed the instructions with the patient answering all questions and patient verbalized understanding. Subjective      Here for DM and chronic fu, lab review, med check  Taking all meds as prescribed  No recent change to diet or exercise. Does not check blood sugars at home  C/o pain right heel for about one month. Twisted foot and since then has had pain bottom of heel. Hurts to put weight on heel. Diabetes  Pertinent negatives for hypoglycemia include no dizziness or headaches. Pertinent negatives for diabetes include no chest pain and no polydipsia. Review of Systems   Constitutional:  Negative for activity change, appetite change and unexpected weight change. Respiratory:  Negative for chest tightness and shortness of breath. Cardiovascular:  Negative for chest pain and palpitations. Gastrointestinal:  Negative for abdominal pain. Endocrine: Negative for polydipsia. Musculoskeletal:         Pain right heel   Skin:  Negative for rash and wound. Neurological:  Negative for dizziness and headaches. Psychiatric/Behavioral:  Positive for dysphoric mood. Negative for self-injury and suicidal ideas.         Current Outpatient Medications on File Prior to Visit   Medication Sig    Blood Glucose Monitoring Suppl (ONE TOUCH ULTRA MINI) w/Device KIT USE AS DIRECTED DX: E11.8    ergocalciferol (ERGOCALCIFEROL) 1.25 MG (09227 UT) capsule     glucose blood (Accu-Chek Samaria Plus) test strip Test 4 times per day    Lancets (ONETOUCH ULTRASOFT) lancets Use as instructed    levothyroxine 150 mcg tablet TAKE 1 TABLET (150 MCG TOTAL) BY MOUTH DAILY IN THE EARLY MORNING    MAGNESIUM PO Take 150 mg by mouth daily    Multiple Vitamins-Minerals (MULTIVITAMIN ADULT) CHEW Chew    telmisartan (MICARDIS) 40 mg tablet Take 1 tablet (40 mg total) by mouth daily    Trulicity 1.5 DB/3.8NO injection INJECT 0.5 ML (1.5 MG TOTAL) UNDER THE SKIN EVERY 7 DAYS    venlafaxine (EFFEXOR-XR) 37.5 mg 24 hr capsule TAKE 1 CAPSULE BY MOUTH DAILY WITH BREAKFAST. Vitamin D, Cholecalciferol, 1000 UNITS CAPS Take by mouth.        Objective     Recent Results (from the past 672 hour(s))   Comprehensive metabolic panel    Collection Time: 11/21/23  8:22 AM   Result Value Ref Range    Glucose, Random 186 (H) 65 - 99 mg/dL    BUN 11 7 - 25 mg/dL    Creatinine 0.64 0.50 - 0.99 mg/dL    eGFR 112 > OR = 60 mL/min/1.73m2    SL AMB BUN/CREATININE RATIO SEE NOTE: 6 - 22 (calc)    Sodium 137 135 - 146 mmol/L    Potassium 4.4 3.5 - 5.3 mmol/L    Chloride 102 98 - 110 mmol/L    CO2 28 20 - 32 mmol/L    Calcium 8.8 8.6 - 10.2 mg/dL    Protein, Total 6.4 6.1 - 8.1 g/dL    Albumin 3.9 3.6 - 5.1 g/dL    Globulin 2.5 1.9 - 3.7 g/dL (calc)    Albumin/Globulin Ratio 1.6 1.0 - 2.5 (calc)    TOTAL BILIRUBIN 0.4 0.2 - 1.2 mg/dL    Alkaline Phosphatase 90 31 - 125 U/L    AST 12 10 - 30 U/L    ALT 13 6 - 29 U/L   CBC and Platelet    Collection Time: 11/21/23  8:22 AM   Result Value Ref Range    White Blood Cell Count 8.6 3.8 - 10.8 Thousand/uL    Red Blood Cell Count 4.12 3.80 - 5.10 Million/uL    Hemoglobin 11.5 (L) 11.7 - 15.5 g/dL    HCT 34.1 (L) 35.0 - 45.0 %    MCV 82.8 80.0 - 100.0 fL    MCH 27.9 27.0 - 33.0 pg    MCHC 33.7 32.0 - 36.0 g/dL    RDW 14.2 11.0 - 15.0 %    Platelet Count 624 741 - 400 Thousand/uL    SL AMB MPV 10.8 7.5 - 12.5 fL   Lipid Panel with Direct LDL reflex    Collection Time: 11/21/23  8:22 AM   Result Value Ref Range    Total Cholesterol 167 <200 mg/dL    HDL 27 (L) > OR = 50 mg/dL    Triglycerides 139 <150 mg/dL    LDL Calculated 115 (H) mg/dL (calc)    Chol HDLC Ratio 6.2 (H) <5.0 (calc)    Non-HDL Cholesterol 140 (H) <130 mg/dL (calc)   TSH, 3rd generation with Free T4 reflex    Collection Time: 11/21/23  8:22 AM   Result Value Ref Range    TSH W/RFX TO FREE T4 1.95 mIU/L   Vitamin D 25 hydroxy    Collection Time: 11/21/23  8:22 AM   Result Value Ref Range    Vitamin D, 25-Hydroxy, Serum 22 (L) 30 - 100 ng/mL   POCT hemoglobin A1c    Collection Time: 12/05/23  4:48 PM   Result Value Ref Range    Hemoglobin A1C 8.1 (A) 6.5   Labs 5/23/2023  A1C 6.8  Vitamin D 33  Chol 176, HDL 29, ,   Reviewed lab/diagnostic results with pt including both normal and abnormal findings. In depth counseling and instructions given. All questions answered during visit. /80 (BP Location: Right arm, Patient Position: Sitting, Cuff Size: Large)   Pulse 72   Temp (!) 96.4 °F (35.8 °C)   Resp 16   Ht 5' 1" (1.549 m)   Wt 112 kg (247 lb)   SpO2 97%   BMI 46.67 kg/m²     Physical Exam  Constitutional:       General: She is not in acute distress. Appearance: She is not ill-appearing. Neck:      Vascular: No carotid bruit. Cardiovascular:      Rate and Rhythm: Normal rate and regular rhythm. Pulses: no weak pulses          Dorsalis pedis pulses are 2+ on the right side and 2+ on the left side. Posterior tibial pulses are 2+ on the right side and 2+ on the left side. Pulmonary:      Effort: Pulmonary effort is normal. No respiratory distress. Breath sounds: Normal breath sounds. No wheezing. Musculoskeletal:      Cervical back: Normal range of motion. Comments: Pain with deep palpation plantar surface of right foot/heel. Feet:      Right foot:      Skin integrity: No ulcer, skin breakdown, erythema, warmth, callus or dry skin.       Left foot:      Skin integrity: No ulcer, skin breakdown, erythema, warmth, callus or dry skin. Skin:     General: Skin is warm and dry. Coloration: Skin is not jaundiced or pale. Neurological:      General: No focal deficit present. Mental Status: She is alert and oriented to person, place, and time. Cranial Nerves: No cranial nerve deficit. Sensory: No sensory deficit. Psychiatric:         Mood and Affect: Mood normal.         Behavior: Behavior normal.         Thought Content: Thought content normal.         Judgment: Judgment normal.     Patient's shoes and socks removed. Right Foot/Ankle   Right Foot Inspection  Skin Exam: skin normal and skin intact. No dry skin, no warmth, no callus, no erythema, no maceration, no abnormal color, no pre-ulcer, no ulcer and no callus. Toe Exam: ROM and strength within normal limits. Sensory   Monofilament testing: intact    Vascular  Capillary refills: < 3 seconds  The right DP pulse is 2+. The right PT pulse is 2+. Left Foot/Ankle  Left Foot Inspection  Skin Exam: skin normal and skin intact. No dry skin, no warmth, no erythema, no maceration, normal color, no pre-ulcer, no ulcer and no callus. Toe Exam: ROM and strength within normal limits. Sensory   Monofilament testing: intact    Vascular  Capillary refills: < 3 seconds  The left DP pulse is 2+. The left PT pulse is 2+.      Assign Risk Category  No deformity present  No loss of protective sensation  No weak pulses  Risk: [address], CRNP

## 2023-12-05 NOTE — PATIENT INSTRUCTIONS
Monitor blood sugar and bring diary of readings to next appointment. Carbohydrate controlled, heart healthy diet. Continue diabetic medications as ordered. Regular exercise  Increase vitamin D3 to 2000 units alternating with 4000 units   Xray right heel as discussed.  May need evaluation by podiatry

## 2023-12-10 ENCOUNTER — APPOINTMENT (OUTPATIENT)
Dept: RADIOLOGY | Facility: MEDICAL CENTER | Age: 43
End: 2023-12-10
Payer: COMMERCIAL

## 2023-12-10 DIAGNOSIS — M79.671 PAIN OF RIGHT HEEL: ICD-10-CM

## 2023-12-10 PROCEDURE — 73650 X-RAY EXAM OF HEEL: CPT

## 2023-12-12 ENCOUNTER — TELEPHONE (OUTPATIENT)
Dept: FAMILY MEDICINE CLINIC | Facility: CLINIC | Age: 43
End: 2023-12-12

## 2023-12-12 NOTE — TELEPHONE ENCOUNTER
----- Message from Magali Li, [address] [facility] sent at 12/11/2023  3:27 PM EST -----  Regarding: FW: Blood sugar monitoring  Contact: [phone]  Please call pt. Rx available for new monitor and test strips/lancets. She can bring rx to pharmacy and they should be able to use for whatever machine is available.       ----- Message -----  From: Osiel Barnard  Sent: 12/11/2023   3:14 PM EST  To: SULMA Avery  Subject: FW: Blood sugar monitoring                         ----- Message -----  From: Nurys Pretty  Sent: 12/11/2023   2:55 PM EST  To: [facility]  Subject: FW: Blood sugar monitoring                         ----- Message -----  From: Adrienne Menchaca  Sent: 12/10/2023   5:09 PM EST  To: [facility]  Subject: Blood sugar monitoring                           My glucose monitor no longer turns on. Can you prescribe me a new one? I'll need new strips and lancets probably too. I have one touch ones here, but I'm betting the new monitor won't work with them.

## 2023-12-13 NOTE — TELEPHONE ENCOUNTER
Patient called back - she does not have a preference to the type of machine - just whatever is covered. She currently has a one touch verio meter that no longer works. Told her I would relay message to office to call her back.

## 2023-12-16 DIAGNOSIS — I10 ESSENTIAL HYPERTENSION: ICD-10-CM

## 2023-12-18 ENCOUNTER — TELEPHONE (OUTPATIENT)
Dept: FAMILY MEDICINE CLINIC | Facility: CLINIC | Age: 43
End: 2023-12-18

## 2023-12-18 DIAGNOSIS — M77.50 ENTHESOPATHY OF ANKLE: ICD-10-CM

## 2023-12-18 DIAGNOSIS — M79.671 INFLAMMATORY PAIN OF RIGHT HEEL: Primary | ICD-10-CM

## 2023-12-18 RX ORDER — TELMISARTAN 40 MG/1
40 TABLET ORAL DAILY
Qty: 90 TABLET | Refills: 1 | Status: SHIPPED | OUTPATIENT
Start: 2023-12-18

## 2023-12-18 NOTE — TELEPHONE ENCOUNTER
----- Message from SULMA Paredes sent at 12/18/2023  7:11 AM EST -----  Please call pt and advise that xray was reviewed  There is a small bony protrusion on heel where she was having pain.   Usually will respond to anti-inflammatory meds and/or physical therapy.   As we discussed at appt, may benefit from podiatry eval so I have placed referral to Dr Hall.

## 2023-12-18 NOTE — TELEPHONE ENCOUNTER
Patient returned call for xray results.  Message from provider in chart relayed.  Contact info given to patient for podiatry.  No further action required.

## 2023-12-26 ENCOUNTER — OFFICE VISIT (OUTPATIENT)
Dept: URGENT CARE | Facility: CLINIC | Age: 43
End: 2023-12-26
Payer: COMMERCIAL

## 2023-12-26 VITALS
RESPIRATION RATE: 18 BRPM | HEART RATE: 79 BPM | HEIGHT: 60 IN | DIASTOLIC BLOOD PRESSURE: 60 MMHG | SYSTOLIC BLOOD PRESSURE: 100 MMHG | BODY MASS INDEX: 48.65 KG/M2 | OXYGEN SATURATION: 99 % | TEMPERATURE: 97 F | WEIGHT: 247.8 LBS

## 2023-12-26 DIAGNOSIS — B96.89 BACTERIAL CONJUNCTIVITIS OF BOTH EYES: Primary | ICD-10-CM

## 2023-12-26 DIAGNOSIS — H10.9 BACTERIAL CONJUNCTIVITIS OF BOTH EYES: Primary | ICD-10-CM

## 2023-12-26 PROCEDURE — 99213 OFFICE O/P EST LOW 20 MIN: CPT | Performed by: PHYSICIAN ASSISTANT

## 2023-12-26 RX ORDER — POLYMYXIN B SULFATE AND TRIMETHOPRIM 1; 10000 MG/ML; [USP'U]/ML
1 SOLUTION OPHTHALMIC
Qty: 10 ML | Refills: 0 | Status: SHIPPED | OUTPATIENT
Start: 2023-12-26 | End: 2024-01-02

## 2023-12-26 NOTE — PROGRESS NOTES
[facility]        NAME: Kassidy Curran is a 43 y.o. female  : 1980    MRN: [id number]  DATE: 2023  TIME: 10:43 AM    Assessment and Plan   Bacterial conjunctivitis of both eyes [H10.9, B96.89]  1. Bacterial conjunctivitis of both eyes  polymyxin b-trimethoprim (POLYTRIM) ophthalmic solution        Patient Instructions   Bacterial conjunctivitis of both eyes  Rx Polytrim every 4 hours in both eyes x 7 days, sent via EMR  Warm compress for discharge  Frequent handwashing and disinfecting of touched surfaces recommended    Follow up with PCP in 3-5 days.  Proceed to  ER if symptoms worsen.    Chief Complaint     Chief Complaint   Patient presents with    Conjunctivitis     3 days ago - started with L eye irritation and swelling. Having yellow exudate. Today awoke with both eyes crusted shut. Denies itching - but is painful. No contact lens use. Has sl. URI s/s.         History of Present Illness       Kassidy is a 43-year-old female who presents to clinic complaining of eye redness x 2 days.  She states started with her left eye and this morning now her right eye.  She also noticed yellow discharge and crust on her eyelids when she woke up this morning.  She denies any itching but notes some discomfort.  She denies any vision changes but notes a little bit of photophobia.  She denies any fever or chills.        Review of Systems   Review of Systems   Constitutional:  Negative for chills and fever.   Eyes:  Positive for photophobia, discharge and redness. Negative for pain, itching and visual disturbance.         Current Medications       Current Outpatient Medications:     polymyxin b-trimethoprim (POLYTRIM) ophthalmic solution, Administer 1 drop to both eyes every 4 (four) hours while awake for 7 days, Disp: 10 mL, Rfl: 0    Blood Glucose Monitoring Suppl (ONE TOUCH ULTRA MINI) w/Device KIT, USE AS DIRECTED DX: E11.8, Disp: , Rfl: 0    ergocalciferol (ERGOCALCIFEROL) 1.25 MG (96130 UT)  capsule, , Disp: , Rfl:     glucose blood (Accu-Chek Samaria Plus) test strip, Test 4 times per day, Disp: 100 each, Rfl: 1    Lancets (ONETOUCH ULTRASOFT) lancets, Use as instructed, Disp: 100 each, Rfl: 0    levothyroxine 150 mcg tablet, TAKE 1 TABLET (150 MCG TOTAL) BY MOUTH DAILY IN THE EARLY MORNING, Disp: 90 tablet, Rfl: 2    MAGNESIUM PO, Take 150 mg by mouth daily, Disp: , Rfl:     Multiple Vitamins-Minerals (MULTIVITAMIN ADULT) CHEW, Chew, Disp: , Rfl:     telmisartan (MICARDIS) 40 mg tablet, TAKE 1 TABLET BY MOUTH EVERY DAY, Disp: 90 tablet, Rfl: 1    Trulicity 1.5 MG/0.5ML injection, INJECT 0.5 ML (1.5 MG TOTAL) UNDER THE SKIN EVERY 7 DAYS, Disp: 3 mL, Rfl: 5    venlafaxine (EFFEXOR-XR) 37.5 mg 24 hr capsule, TAKE 1 CAPSULE BY MOUTH DAILY WITH BREAKFAST., Disp: 90 capsule, Rfl: 0    Vitamin D, Cholecalciferol, 1000 UNITS CAPS, Take by mouth., Disp: , Rfl:     Current Allergies     Allergies as of 12/26/2023 - Reviewed 12/26/2023   Allergen Reaction Noted    Jardiance [empagliflozin] Rash 02/21/2023    Albuterol  03/21/2013    Cat hair extract  06/13/2018    Dog epithelium  06/13/2018    Metformin Diarrhea 08/24/2021    Other  09/02/2014    Pollen extract  05/13/2014            The following portions of the patient's history were reviewed and updated as appropriate: allergies, current medications, past family history, past medical history, past social history, past surgical history and problem list.     Past Medical History:   Diagnosis Date    Abnormal blood chemistry     Allergic rhinitis     Antepartum diabetes mellitus     Bilateral chronic serous otitis media 8/13/2020    Cough     Disease of thyroid gland     HYPOTHYROID    Elevated blood pressure reading     Gastritis     Generalized anxiety disorder     History of asthma     History of diabetes mellitus     History of dysfunctional uterine bleeding     History of ear pain     left    History of edema     History of gestational diabetes     History of  hyperlipidemia     History of hypothyroidism     History of polycystic ovarian syndrome     History of sore throat     History of upper respiratory infection     Migraines     Morbidly obese (HCC)     Nasal congestion     Nonspecific abnormal finding     Obesity     Polycystic ovary     Postsurgical dumping syndrome     Seasonal allergies     Sleep apnea     on CPAP    Sleep difficulties     Suspected fetal anomaly not found     Tinnitus        Past Surgical History:   Procedure Laterality Date    ADENOIDECTOMY      CARDIAC ELECTROPHYSIOLOGY STUDY AND ABLATION       SECTION      CHOLECYSTECTOMY      FOOT SURGERY      GALLBLADDER SURGERY      MOUTH SURGERY      NM ESOPHAGOGASTRODUODENOSCOPY TRANSORAL DIAGNOSTIC N/A 2016    Procedure: ESOPHAGOGASTRODUODENOSCOPY (EGD);  Surgeon: Becki Patrick MD;  Location: [facility];  Service: General    TONSILLECTOMY      TOOTH EXTRACTION      WISDOM TOOTH EXTRACTION         Family History   Problem Relation Age of Onset    Heart disease Mother     Hypertension Mother     Valvular heart disease Mother     Breast cancer Mother 65    No Known Problems Daughter     Breast cancer Maternal Grandmother 75        triple neg    Diabetes Maternal Grandmother     Hypertension Maternal Grandmother     Diabetes Maternal Grandfather     No Known Problems Paternal Grandmother     No Known Problems Paternal Grandfather     No Known Problems Son     No Known Problems Maternal Aunt     No Known Problems Maternal Aunt     No Known Problems Paternal Aunt     No Known Problems Paternal Aunt     Arthritis Family     Varicose Veins Family         of lower extremities         Medications have been verified.        Objective   /60   Pulse 79   Temp (!) 97 °F (36.1 °C)   Resp 18   Ht 5' (1.524 m)   Wt 112 kg (247 lb 12.8 oz)   LMP 2023 (Exact Date)   SpO2 99%   BMI 48.40 kg/m²   Patient's last menstrual period was 2023 (exact date).       Physical Exam     Physical  Exam  Vitals and nursing note reviewed.   Constitutional:       General: She is not in acute distress.     Appearance: Normal appearance. She is not ill-appearing.   Eyes:      General: Vision grossly intact.         Right eye: No foreign body, discharge or hordeolum.         Left eye: No foreign body, discharge or hordeolum.      Extraocular Movements: Extraocular movements intact.      Conjunctiva/sclera:      Right eye: Right conjunctiva is injected.      Left eye: Left conjunctiva is injected.      Pupils: Pupils are equal, round, and reactive to light.   Pulmonary:      Effort: Pulmonary effort is normal.   Neurological:      Mental Status: She is alert and oriented to person, place, and time.   Psychiatric:         Mood and Affect: Mood normal.         Behavior: Behavior normal.

## 2024-01-04 ENCOUNTER — OFFICE VISIT (OUTPATIENT)
Age: 44
End: 2024-01-04
Payer: COMMERCIAL

## 2024-01-04 VITALS
HEIGHT: 60 IN | BODY MASS INDEX: 48.49 KG/M2 | DIASTOLIC BLOOD PRESSURE: 83 MMHG | HEART RATE: 79 BPM | WEIGHT: 247 LBS | SYSTOLIC BLOOD PRESSURE: 140 MMHG

## 2024-01-04 DIAGNOSIS — M72.2 PLANTAR FASCIITIS OF RIGHT FOOT: Primary | ICD-10-CM

## 2024-01-04 DIAGNOSIS — M77.50 ENTHESOPATHY OF ANKLE: ICD-10-CM

## 2024-01-04 DIAGNOSIS — M79.671 INFLAMMATORY PAIN OF RIGHT HEEL: ICD-10-CM

## 2024-01-04 PROCEDURE — 99204 OFFICE O/P NEW MOD 45 MIN: CPT | Performed by: STUDENT IN AN ORGANIZED HEALTH CARE EDUCATION/TRAINING PROGRAM

## 2024-01-04 RX ORDER — MELOXICAM 15 MG/1
15 TABLET ORAL DAILY
Qty: 30 TABLET | Refills: 0 | Status: SHIPPED | OUTPATIENT
Start: 2024-01-04

## 2024-01-04 NOTE — PATIENT INSTRUCTIONS
-Purchase a supportive over-the-counter pair of inserts such as Superfeet, powersteps, tread labs  -purchase a supportive pair of sneakers such as Shaq Pope, Danielle, New Balance.

## 2024-01-04 NOTE — PROGRESS NOTES
This patient was seen on 1/4/2024.    My role is Foot , Ankle, and Wound Specialist    ASSESSMENT     Diagnoses and all orders for this visit:    Plantar fasciitis of right foot  -     meloxicam (Mobic) 15 mg tablet; Take 1 tablet (15 mg total) by mouth daily  -     P.F. Night Splint    Inflammatory pain of right heel  -     Ambulatory Referral to Podiatry    Enthesopathy of ankle  -     Ambulatory Referral to Podiatry         Problem List Items Addressed This Visit          Musculoskeletal and Integument    Plantar fasciitis of right foot - Primary    Relevant Medications    meloxicam (Mobic) 15 mg tablet    Other Relevant Orders    P.F. Night Splint     Other Visit Diagnoses       Inflammatory pain of right heel        Enthesopathy of ankle              PLAN  -Patient was educated regarding their condition.  -Rx night splint  -Educated patient on plantar fasciitis stretching program to be performed daily  -Recommend purchase of supportive shoes with wide toe box. Recommend purchase of OTC orthosis (superfeet, powersteps, or tread labs).  -Rx meloxicam  -Corticosteroid injection deferred today secondary to patient's type 2 diabetes  -Patient will RTC in 6-weeks    -X-ray from 12/10/2023 interpreted independently: No acute osseous abnormality noted.  No abnormalities noted within the soft tissues.      SUBJECTIVE    Chief Complaint:  Right foot pain     Patient ID: Kassidy Curran     1/4/2024: Kassidy is a pleasant 43-year-old female who presents today with pain to her right foot.  She states that this started in November when she was walking around the [employer].  She states that since this time it has not really gotten any better.  She states that the pain is worse when she is been sitting for a long time at times to stand up.  So far for treatment she is attempted rest as well as elevation.        The following portions of the patient's history were reviewed and updated as appropriate: allergies,  current medications, past family history, past medical history, past social history, past surgical history and problem list.    Review of Systems   Constitutional: Negative.    HENT: Negative.     Respiratory: Negative.     Cardiovascular: Negative.    Gastrointestinal: Negative.    Musculoskeletal:  Positive for myalgias.   Skin: Negative.    Neurological: Negative.          OBJECTIVE      /83   Pulse 79   Ht 5' (1.524 m) Comment: verbal  Wt 112 kg (247 lb) Comment: verbal  LMP 12/23/2023 (Exact Date)   BMI 48.24 kg/m²        Physical Exam  Constitutional:       Appearance: Normal appearance.   HENT:      Head: Normocephalic and atraumatic.   Eyes:      General:         Right eye: No discharge.         Left eye: No discharge.   Cardiovascular:      Rate and Rhythm: Normal rate and regular rhythm.      Pulses: no weak pulses          Dorsalis pedis pulses are 2+ on the right side and 2+ on the left side.        Posterior tibial pulses are 2+ on the right side and 2+ on the left side.   Pulmonary:      Effort: Pulmonary effort is normal.      Breath sounds: Normal breath sounds.   Feet:      Right foot:      Skin integrity: No ulcer, skin breakdown, erythema, warmth, callus or dry skin.      Left foot:      Skin integrity: No ulcer, skin breakdown, erythema, warmth, callus or dry skin.   Skin:     General: Skin is warm.      Capillary Refill: Capillary refill takes less than 2 seconds.   Neurological:      Sensory: Sensation is intact. No sensory deficit.         MSK:  -Pain on palpation of medial calcaneal tubercle at the insertion site of the plantar fascia  -no pain with compression of both sides of heel  -No gross deformities noted  -Active range of motion lesser digits intact  -MMT 5/5 to all muscle compartments of the lower extremity  -Ankle dorsiflexion is less than 10 degrees with knee extended, and knee flexed    Derm:  -No lesions, abrasions, or open wounds noted  -No noted interdigital  maceration, peeling, malodor  -No areas of callus formation noted on exam  -no erythema, ecchymosis, edema noted     Vascular:  -DP and PT pulses intact b/l  -Capillary refill time <2 sec b/l  -Skin temp: WNL    Neuro:  -Light sensation intact bilaterally  -Protective sensation intact bilaterally  -Tinel sign negative    Patient's shoes and socks removed.    Right Foot/Ankle   Right Foot Inspection  Skin Exam: skin normal and skin intact. No dry skin, no warmth, no callus, no erythema, no maceration, no abnormal color, no pre-ulcer, no ulcer and no callus.     Toe Exam: ROM and strength within normal limits. No swelling, no tenderness, erythema and  no right toe deformity    Sensory   Vibration: intact  Proprioception: intact  Monofilament testing: intact    Vascular  Capillary refills: < 3 seconds  The right DP pulse is 2+. The right PT pulse is 2+.     Right Toe  - Comprehensive Exam  Ecchymosis: none  Arch: normal  Hammertoes: absent  Claw Toes: absent  Swelling: none   Tenderness: plantar fascia       Left Foot/Ankle  Left Foot Inspection  Skin Exam: skin normal and skin intact. No dry skin, no warmth, no erythema, no maceration, normal color, no pre-ulcer, no ulcer and no callus.     Toe Exam: ROM and strength within normal limits. No swelling, no tenderness, no erythema and no left toe deformity.     Sensory   Vibration: intact  Proprioception: intact  Monofilament testing: intact    Vascular  Capillary refills: < 3 seconds  The left DP pulse is 2+. The left PT pulse is 2+.     Left Toe  - Comprehensive Exam  Ecchymosis: none  Arch: normal  Hammertoes: absent  Claw toes: absent  Swelling: none   Tenderness: none       Assign Risk Category  No deformity present  No loss of protective sensation  No weak pulses  Risk: 0

## 2024-01-08 ENCOUNTER — OFFICE VISIT (OUTPATIENT)
Dept: FAMILY MEDICINE CLINIC | Facility: CLINIC | Age: 44
End: 2024-01-08
Payer: COMMERCIAL

## 2024-01-08 VITALS
TEMPERATURE: 97.8 F | WEIGHT: 249 LBS | SYSTOLIC BLOOD PRESSURE: 130 MMHG | HEIGHT: 60 IN | BODY MASS INDEX: 48.88 KG/M2 | RESPIRATION RATE: 16 BRPM | OXYGEN SATURATION: 98 % | DIASTOLIC BLOOD PRESSURE: 80 MMHG | HEART RATE: 78 BPM

## 2024-01-08 DIAGNOSIS — E11.9 TYPE 2 DIABETES MELLITUS WITHOUT COMPLICATION, WITHOUT LONG-TERM CURRENT USE OF INSULIN (HCC): ICD-10-CM

## 2024-01-08 DIAGNOSIS — H66.92 ACUTE LEFT OTITIS MEDIA: Primary | ICD-10-CM

## 2024-01-08 DIAGNOSIS — H65.191 ACUTE EFFUSION OF RIGHT EAR: ICD-10-CM

## 2024-01-08 PROCEDURE — 99214 OFFICE O/P EST MOD 30 MIN: CPT | Performed by: NURSE PRACTITIONER

## 2024-01-08 RX ORDER — AMOXICILLIN AND CLAVULANATE POTASSIUM 875; 125 MG/1; MG/1
1 TABLET, FILM COATED ORAL EVERY 12 HOURS SCHEDULED
Qty: 14 TABLET | Refills: 0 | Status: SHIPPED | OUTPATIENT
Start: 2024-01-08 | End: 2024-01-15

## 2024-01-08 RX ORDER — FLUTICASONE PROPIONATE 50 MCG
1 SPRAY, SUSPENSION (ML) NASAL DAILY
Qty: 16 G | Refills: 0 | Status: SHIPPED | OUTPATIENT
Start: 2024-01-08

## 2024-01-08 NOTE — PATIENT INSTRUCTIONS
Amoxicillin 875mg twice daily for one week  Flonase 1 spray each nostril twice daily for next week  Tylenol or Motrin as needed.   Call or return to office if symptoms worsen or if new symptoms develop.

## 2024-01-08 NOTE — PROGRESS NOTES
Name: Kassidy Curran      : 1980      MRN: [id number]  Encounter Provider: SULMA Paredes  Encounter Date: 2024   Encounter department: [facility]    Assessment & Plan   1. Acute left otitis media  Amoxicillin bid x one week  Tylenol or motrin as needed  Call or return to office if symptoms worsen or if new symptoms develop.   - amoxicillin-clavulanate (AUGMENTIN) 875-125 mg per tablet; Take 1 tablet by mouth every 12 (twelve) hours for 7 days  Dispense: 14 tablet; Refill: 0    2. Acute effusion of right ear  - fluticasone (FLONASE) 50 mcg/act nasal spray; 1 spray into each nostril daily  Dispense: 16 g; Refill: 0    3. Type 2 diabetes mellitus without complication, without long-term current use of insulin ([facility])  Monitor blood sugar and bring diary of readings to next appointment.   Carbohydrate controlled, heart healthy diet.   Continue diabetic medications as ordered.   Increase regular exercise: Consider such things as walking, biking, strength training , etc.     Patient was counseled regarding instructions for management which included: impression/diagnosis, risk/benefits of treatment options, importance of compliance with treatment, risk factor reduction, and prognosis.   I have reviewed the instructions with the patient answering all questions and patient verbalized understanding.           Subjective      Here for sick visit  Symptoms started   Started with congestion and pink eye.   Both ears painful.   No cough. Mild congestion  Blood sugars have been running a little high, this am 206  Ears getting more painful and worse when lying done.   Did home covid test x 2    Earache   Pertinent negatives include no coughing or headaches.     Review of Systems   Constitutional:  Negative for fever.   HENT:  Positive for congestion and ear pain. Negative for sinus pressure and sinus pain.    Respiratory:  Negative for cough.    Neurological:  Negative for dizziness  and headaches.       Current Outpatient Medications on File Prior to Visit   Medication Sig    Blood Glucose Monitoring Suppl (ONE TOUCH ULTRA MINI) w/Device KIT USE AS DIRECTED DX: E11.8    ergocalciferol (ERGOCALCIFEROL) 1.25 MG (21374 UT) capsule     glucose blood (Accu-Chek Samaria Plus) test strip Test 4 times per day    Lancets (ONETOUCH ULTRASOFT) lancets Use as instructed    levothyroxine 150 mcg tablet TAKE 1 TABLET (150 MCG TOTAL) BY MOUTH DAILY IN THE EARLY MORNING    MAGNESIUM PO Take 150 mg by mouth daily    meloxicam (Mobic) 15 mg tablet Take 1 tablet (15 mg total) by mouth daily    Multiple Vitamins-Minerals (MULTIVITAMIN ADULT) CHEW Chew    telmisartan (MICARDIS) 40 mg tablet TAKE 1 TABLET BY MOUTH EVERY DAY    Trulicity 1.5 MG/0.5ML injection INJECT 0.5 ML (1.5 MG TOTAL) UNDER THE SKIN EVERY 7 DAYS    venlafaxine (EFFEXOR-XR) 37.5 mg 24 hr capsule TAKE 1 CAPSULE BY MOUTH DAILY WITH BREAKFAST.    Vitamin D, Cholecalciferol, 1000 UNITS CAPS Take by mouth.       Objective     /80 (BP Location: Right arm, Patient Position: Sitting, Cuff Size: Large)   Pulse 78   Temp 97.8 °F (36.6 °C)   Resp 16   Ht 5' (1.524 m)   Wt 113 kg (249 lb)   LMP 12/23/2023 (Exact Date)   SpO2 98%   BMI 48.63 kg/m²     Physical Exam  Constitutional:       General: She is not in acute distress.     Appearance: She is not ill-appearing.   HENT:      Ears:      Comments: Right effusion  Left TM mildly bulging and pale  Both ear canals mildly erythematous     Nose: Congestion present.      Mouth/Throat:      Mouth: Mucous membranes are moist.      Pharynx: Oropharynx is clear.   Eyes:      General:         Right eye: No discharge.         Left eye: No discharge.   Cardiovascular:      Rate and Rhythm: Normal rate and regular rhythm.   Pulmonary:      Effort: Pulmonary effort is normal. No respiratory distress.      Breath sounds: Normal breath sounds. No wheezing or rhonchi.   Skin:     General: Skin is warm and dry.       Coloration: Skin is not jaundiced or pale.   Neurological:      General: No focal deficit present.      Mental Status: She is alert and oriented to person, place, and time.   Psychiatric:         Mood and Affect: Mood normal.         Behavior: Behavior normal.         Thought Content: Thought content normal.         Judgment: Judgment normal.       SULMA Paredes

## 2024-01-09 ENCOUNTER — TELEPHONE (OUTPATIENT)
Dept: ADMINISTRATIVE | Facility: OTHER | Age: 44
End: 2024-01-09

## 2024-01-09 NOTE — TELEPHONE ENCOUNTER
Upon review of the In Basket request we were able to locate, review, and update the patient chart as requested for Immunization(s) Influenza.    Any additional questions or concerns should be emailed to the Practice Liaisons via the appropriate education email address, please do not reply via In Basket.    Thank you  Chelsea Charles MA

## 2024-01-09 NOTE — TELEPHONE ENCOUNTER
Upon review of the In Basket request and the patient's chart, initial outreach has been made via fax to facility. Please see Contacts section for details.     Thank you  Chelsea Charles MA

## 2024-01-09 NOTE — TELEPHONE ENCOUNTER
----- Message from Meka Hugo sent at 1/8/2024  4:32 PM EST -----  Regarding: flu shot  01/08/24 4:32 PM    Hello, our patient attached above has had Immunization(s) completed/performed. Please assist in updating the patient chart by making an External outreach to [facility] located in [address]. The date of service is 2023.    Thank you,  Meka SCHULZ

## 2024-01-09 NOTE — LETTER
Vaccination Request Form: Influenza      Date Requested: 24  Patient: Kassidy Curran  Patient : 1980   Referring Provider: SULMA Paredes       The above patient has informed us that they have had their   most recent Influenza administered at your facility. Please   complete this form and attach all corresponding documentation.    Date of Vaccine(s) Given  ______________________________    Lot Number(s) _______________________________________    Manufacture(s) ______________________________________    Dose Amount (s) _____________________________________    Expiration Date(s) ____________________________________    Comments __________________________________________________________  ____________________________________________________________________  ____________________________________________________________________  ____________________________________________________________________    Administering Facility  ________________________________________________    Vaccine Administered By (print name) ___________________________________      Form Completed By (print name) _______________________________________      Signature ___________________________________________________________      These reports are needed for  compliance.    Please fax this completed form and a copy of the Vaccine Document(s) to our office located at [facility] [place] [address] as soon as possible to Fax [phone] attention Chelsea: Phone [phone]    We thank you for your assistance in treating our mutual patient.        [facility]    History of Present Illness:   Sherif Arana is a 70 y.o. male with history of HTN, GERD, RACHEL, PVC, asthma, DDD  CC: Follow up and Right side Pain  History provided by patient and Records    HPI:  Right Side Pain: Patient Reports recently developing right sided low back pain that wraps to the front. Noting pain with bending over and to the right side as well. B12/Vitamin D Deficiency: Patient stopped taking B12 and feels okay overall. GERD: Stable overall. Hypertension Follow up:  Currently Taking:   Key CAD CHF Meds             hydroCHLOROthiazide (HYDRODIURIL) 12.5 mg tablet (Taking) Take 1 Tablet by mouth daily. metoprolol succinate (TOPROL-XL) 50 mg XL tablet (Taking) Take 1 Tablet by mouth daily. ramipriL (ALTACE) 10 mg capsule (Taking) Take 1 Capsule by mouth two (2) times a day. rosuvastatin (CRESTOR) 10 mg tablet (Taking) Take 1 Tablet by mouth nightly. The patient reports:  taking medications as instructed, no medication side effects noted, no TIA's, no chest pain on exertion, no dyspnea on exertion, no swelling of ankles, no orthostatic dizziness or lightheadedness, no orthopnea or paroxysmal nocturnal dyspnea. BP Readings from Last 3 Encounters:   12/27/21 112/78   10/27/21 117/77   07/27/21 125/75      Hypertriglyceridemia Follow up:   Cardiovascular risks for him are: hypertension  hyperlipidemia  obese. Current Medications:  Key Antihyperlipidemia Meds             rosuvastatin (CRESTOR) 10 mg tablet (Taking) Take 1 Tablet by mouth nightly.           Compliance: YES   Myalgias: NO   Fatigue: NO   Other side effects: NO     Wt Readings from Last 3 Encounters:   12/27/21 242 lb 6.4 oz (110 kg)   10/27/21 235 lb (106.6 kg)   07/27/21 234 lb (106.1 kg)       Lab Results   Component Value Date/Time    Cholesterol, total 127 07/21/2021 08:48 AM    HDL Cholesterol 44 07/21/2021 08:48 AM    LDL, calculated 58.2 07/21/2021 08:48 AM    VLDL, calculated 24.8 07/21/2021 08:48 AM    Triglyceride 124 07/21/2021 08:48 AM    CHOL/HDL Ratio 2.9 07/21/2021 08:48 AM      Lab Results   Component Value Date/Time    ALT (SGPT) 46 07/21/2021 08:48 AM    AST (SGOT) 22 07/21/2021 08:48 AM    Alk. phosphatase 72 07/21/2021 08:48 AM    Bilirubin, total 0.4 07/21/2021 08:48 AM      Sleep Apnea: Using C-Pap    Asthma: Albuterol needs to be refilled. Health Maintenance  Health Maintenance Due   Topic Date Due    COVID-19 Vaccine (1) Never done    DTaP/Tdap/Td series (2 - Td or Tdap) 09/02/2021       Past Medical, Family, and Social History:     Current Outpatient Medications on File Prior to Visit   Medication Sig Dispense Refill    albuterol (PROVENTIL HFA, VENTOLIN HFA, PROAIR HFA) 90 mcg/actuation inhaler       fluticasone propionate (FLONASE) 50 mcg/actuation nasal spray Use 2 spray(s) in each nostril once daily 16 g 6    cholecalciferol, VITAMIN D3, (VITAMIN D3) 5,000 unit tab tablet Take 1,000 Units by mouth every morning.  cetirizine (ZYRTEC) 10 mg tablet Take 10 mg by mouth every morning.  [DISCONTINUED] ramipriL (ALTACE) 10 mg capsule Take 1 capsule by mouth twice daily 180 Capsule 1    [DISCONTINUED] hydroCHLOROthiazide (HYDRODIURIL) 12.5 mg tablet Take 1 tablet by mouth once daily 90 Tablet 1    [DISCONTINUED] metoprolol succinate (TOPROL-XL) 50 mg XL tablet Take 1 Tablet by mouth daily. 90 Tablet 1    [DISCONTINUED] rosuvastatin (CRESTOR) 10 mg tablet TAKE ONE TABLET BY MOUTH NIGHTLY 90 Tablet 0    [DISCONTINUED] albuterol (PROVENTIL VENTOLIN) 2.5 mg /3 mL (0.083 %) nebu Take 3 mL by inhalation every four (4) hours as needed (cough/wheezing). 50 Nebule 4    [DISCONTINUED] cyclobenzaprine (FLEXERIL) 10 mg tablet Take 1 Tab by mouth three (3) times daily as needed for Muscle Spasm(s). (Patient not taking: Reported on 12/27/2021) 60 Tab 2    cyanocobalamin (VITAMIN B-12) 1,000 mcg tablet Take 1,000 mcg by mouth two (2) times a day.  (Patient not taking: Reported on 2021)       No current facility-administered medications on file prior to visit. 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 9) with comorbidity (HCC) E66.01    Acute bronchitis J20.9    Inguinal lymphadenopathy R59.0    Lymphadenopathy R59.1    BMI 33.0-33.9,adult Z68.33       Social History     Socioeconomic History    Marital status:      Spouse name: Not on file    Number of children: Not on file    Years of education: Not on file    Highest education level: Not on file   Occupational History    Not on file   Tobacco Use    Smoking status: Former Smoker     Packs/day: 3.00     Years: 35.00     Pack years: 105.00     Types: Cigarettes     Quit date:      Years since quittin.0    Smokeless tobacco: Never Used   Vaping Use    Vaping Use: Never used   Substance and Sexual Activity    Alcohol use:  Yes     Alcohol/week: 21.0 standard drinks     Types: 21 Shots of liquor per week    Drug use: No    Sexual activity: Yes     Partners: Female     Birth control/protection: None   Other Topics Concern     Service Not Asked    Blood Transfusions Not Asked    Caffeine Concern Not Asked    Occupational Exposure Not Asked    Hobby Hazards Not Asked    Sleep Concern Not Asked    Stress Concern Not Asked    Weight Concern Not Asked    Special Diet Not Asked    Back Care Not Asked    Exercise Not Asked    Bike Helmet Not Asked   [address] Not Asked    Self-Exams Not Asked   Social History Narrative    Not on file     Social Determinants of Health     Financial Resource Strain:     Difficulty of Paying Living Expenses: Not on file   Food Insecurity:     Worried About Running Out of Food in the Last Year: Not on file    Kari of Food in the Last Year: Not on file   Transportation Needs:     Lack of Transportation (Medical): Not on file    Lack of Transportation (Non-Medical): Not on file   Physical Activity:     Days of Exercise per Week: Not on file    Minutes of Exercise per Session: Not on file   Stress:     Feeling of Stress : Not on file   Social Connections:     Frequency of Communication with Friends and Family: Not on file    Frequency of Social Gatherings with Friends and Family: Not on file    Attends Tenriism Services: Not on file    Active Member of [address] [place] [address] or Organizations: Not on file    Attends Club or Organization Meetings: Not on file    Marital Status: Not on file   Intimate Partner Violence:     Fear of Current or Ex-Partner: Not on file    Emotionally Abused: Not on file    Physically Abused: Not on file    Sexually Abused: Not on file   Housing Stability:     Unable to Pay for Housing in the Last Year: Not on file    Number of [place] in the Last Year: Not on file    Unstable Housing in the Last Year: Not on file       Review of Systems   Review of Systems   Constitutional: Negative for chills and fever. Cardiovascular: Negative for chest pain and palpitations. Musculoskeletal: Positive for back pain. Neurological: Negative for dizziness and headaches.        Objective:     Visit Vitals  /78 (BP 1 Location: Right upper arm, BP Patient Position: Sitting, BP Cuff Size: Large adult)   Pulse 90   Temp 98 °F (36.7 °C) (Oral)   Resp 16   Ht 5' 10\" (1.778 m)   Wt 242 lb 6.4 oz (110 kg)   SpO2 98%   BMI 34.78 kg/m²        Physical Exam  Vitals and nursing note reviewed. Constitutional:       Appearance: Normal appearance. HENT:      Head: Normocephalic and atraumatic. Cardiovascular:      Rate and Rhythm: Normal rate and regular rhythm. Pulses: Normal pulses. Heart sounds: Normal heart sounds. No murmur heard. No friction rub. No gallop. Pulmonary:      Effort: Pulmonary effort is normal.      Breath sounds: Normal breath sounds. Abdominal:      General: Abdomen is flat. Bowel sounds are normal.      Palpations: Abdomen is soft. Musculoskeletal:      Cervical back: Normal range of motion and neck supple. Skin:     General: Skin is warm and dry. Neurological:      Mental Status: He is alert. Pertinent Labs/Studies:      Assessment and orders:       ICD-10-CM ICD-9-CM    1. Essential hypertension  I10 401.9 hydroCHLOROthiazide (HYDRODIURIL) 12.5 mg tablet      metoprolol succinate (TOPROL-XL) 50 mg XL tablet      ramipriL (ALTACE) 10 mg capsule      CBC W/O DIFF      METABOLIC PANEL, COMPREHENSIVE   2. Mild intermittent asthma without complication  Z84.21 363.20 albuterol (PROVENTIL VENTOLIN) 2.5 mg /3 mL (0.083 %) nebu   3. Pure hypercholesterolemia  E78.00 272.0 rosuvastatin (CRESTOR) 10 mg tablet      LIPID PANEL   4. B12 deficiency  E53.8 266.2 VITAMIN B12 & FOLATE   5. Vitamin D deficiency  E55.9 268.9 VITAMIN D, 25 HYDROXY   6. Chronic pain of right knee  M25.561 719.46     G89.29 338.29    7. Primary osteoarthritis of right knee  M17.11 715.16 diclofenac (VOLTAREN) 1 % gel      predniSONE (DELTASONE) 20 mg tablet   8. RACHEL (obstructive sleep apnea)  G47.33 327.23      Diagnoses and all orders for this visit:    1. Essential hypertension:Our goal is to normalize the blood pressure to decrease the risks of strokes and heart attacks.  The patient is in agreement with the plan. -     hydroCHLOROthiazide (HYDRODIURIL) 12.5 mg tablet; Take 1 Tablet by mouth daily. -     metoprolol succinate (TOPROL-XL) 50 mg XL tablet; Take 1 Tablet by mouth daily. -     ramipriL (ALTACE) 10 mg capsule; Take 1 Capsule by mouth two (2) times a day. -     CBC W/O DIFF; Future  -     METABOLIC PANEL, COMPREHENSIVE; Future    2. Mild intermittent asthma without complication: Refill  -     albuterol (PROVENTIL VENTOLIN) 2.5 mg /3 mL (0.083 %) nebu; Take 3 mL by inhalation every four (4) hours as needed (cough/wheezing). 3. Pure hypercholesterolemia: The patient is aware of our goal to reduce or eliminate the long term problems (such as strokes and heart attacks) related to poorly controlled Triglycerides, LDL, Cholesterol.   -     rosuvastatin (CRESTOR) 10 mg tablet; Take 1 Tablet by mouth nightly. -     LIPID PANEL; Future    4. B12 deficiency  -     VITAMIN B12 & FOLATE; Future    5. Vitamin D deficiency  -     VITAMIN D, 25 HYDROXY; Future    6. Chronic pain of right knee    7. Primary osteoarthritis of right knee  -     diclofenac (VOLTAREN) 1 % gel; Apply 1 g to affected area four (4) times daily. -     predniSONE (DELTASONE) 20 mg tablet; Take 20 mg by mouth daily (with breakfast) for 5 days. 8. RACHEL (obstructive sleep apnea): Stable      Follow-up and Dispositions    · Return in about 3 months (around 3/27/2022). I have discussed the diagnosis with the patient and the intended plan as seen in the above orders. Social history, medical history, and labs were reviewed. The patient has received an after-visit summary and questions were answered concerning future plans. I have discussed medication side effects and warnings with the patient as well.     MD ARTUR Victoria & RICHARD REESE [facility]  12/27/21

## 2024-01-30 DIAGNOSIS — H65.191 ACUTE EFFUSION OF RIGHT EAR: ICD-10-CM

## 2024-01-30 RX ORDER — FLUTICASONE PROPIONATE 50 MCG
SPRAY, SUSPENSION (ML) NASAL
Qty: 48 ML | Refills: 0 | Status: SHIPPED | OUTPATIENT
Start: 2024-01-30

## 2024-02-04 DIAGNOSIS — M72.2 PLANTAR FASCIITIS OF RIGHT FOOT: ICD-10-CM

## 2024-02-05 RX ORDER — MELOXICAM 15 MG/1
15 TABLET ORAL DAILY
Qty: 90 TABLET | Refills: 1 | Status: SHIPPED | OUTPATIENT
Start: 2024-02-05

## 2024-02-11 DIAGNOSIS — E06.3 HYPOTHYROIDISM DUE TO HASHIMOTO'S THYROIDITIS: ICD-10-CM

## 2024-02-11 DIAGNOSIS — E03.8 HYPOTHYROIDISM DUE TO HASHIMOTO'S THYROIDITIS: ICD-10-CM

## 2024-02-11 RX ORDER — LEVOTHYROXINE SODIUM 0.15 MG/1
150 TABLET ORAL
Qty: 90 TABLET | Refills: 2 | Status: SHIPPED | OUTPATIENT
Start: 2024-02-11

## 2024-02-26 ENCOUNTER — TELEPHONE (OUTPATIENT)
Dept: FAMILY MEDICINE CLINIC | Facility: CLINIC | Age: 44
End: 2024-02-26

## 2024-02-26 NOTE — TELEPHONE ENCOUNTER
----- Message from SULMA Paredes sent at 2/26/2024 11:31 AM EST -----  Regarding: labs  Pt has upcoming appt for DM fu on 3/5.  Needs to have labs done prior to appt. Orders in chart.   Please call pt to advise. If labs are not done, appt will need to be re-scheduled. TY

## 2024-02-26 NOTE — TELEPHONE ENCOUNTER
Spoke with patient 2/26...  reminded her to get her labs done prior to 3/5 appt. And changed time of appt due to her changed work sched.

## 2024-03-01 ENCOUNTER — TELEPHONE (OUTPATIENT)
Dept: FAMILY MEDICINE CLINIC | Facility: CLINIC | Age: 44
End: 2024-03-01

## 2024-03-01 NOTE — TELEPHONE ENCOUNTER
LMOM for patient letting her know she needs to get her labs done prior to her appt on 3/5 or her appt will need to be rescheduled.

## 2024-03-03 LAB
ALBUMIN SERPL-MCNC: 4.1 G/DL (ref 3.6–5.1)
ALBUMIN/CREAT UR: 12 MCG/MG CREAT
ALBUMIN/GLOB SERPL: 1.5 (CALC) (ref 1–2.5)
ALP SERPL-CCNC: 109 U/L (ref 31–125)
ALT SERPL-CCNC: 16 U/L (ref 6–29)
AST SERPL-CCNC: 16 U/L (ref 10–30)
BILIRUB SERPL-MCNC: 0.5 MG/DL (ref 0.2–1.2)
BUN SERPL-MCNC: 15 MG/DL (ref 7–25)
BUN/CREAT SERPL: ABNORMAL (CALC) (ref 6–22)
CALCIUM SERPL-MCNC: 8.9 MG/DL (ref 8.6–10.2)
CHLORIDE SERPL-SCNC: 101 MMOL/L (ref 98–110)
CO2 SERPL-SCNC: 30 MMOL/L (ref 20–32)
CREAT SERPL-MCNC: 0.71 MG/DL (ref 0.5–0.99)
CREAT UR-MCNC: 137 MG/DL (ref 20–275)
GFR/BSA.PRED SERPLBLD CYS-BASED-ARV: 107 ML/MIN/1.73M2
GLOBULIN SER CALC-MCNC: 2.8 G/DL (CALC) (ref 1.9–3.7)
GLUCOSE SERPL-MCNC: 165 MG/DL (ref 65–99)
HBA1C MFR BLD: 7 % OF TOTAL HGB
MICROALBUMIN UR-MCNC: 1.6 MG/DL
POTASSIUM SERPL-SCNC: 4.2 MMOL/L (ref 3.5–5.3)
PROT SERPL-MCNC: 6.9 G/DL (ref 6.1–8.1)
SODIUM SERPL-SCNC: 139 MMOL/L (ref 135–146)

## 2024-03-05 ENCOUNTER — OFFICE VISIT (OUTPATIENT)
Dept: FAMILY MEDICINE CLINIC | Facility: CLINIC | Age: 44
End: 2024-03-05
Payer: COMMERCIAL

## 2024-03-05 VITALS
TEMPERATURE: 98 F | OXYGEN SATURATION: 98 % | SYSTOLIC BLOOD PRESSURE: 130 MMHG | HEIGHT: 60 IN | DIASTOLIC BLOOD PRESSURE: 80 MMHG | WEIGHT: 246 LBS | RESPIRATION RATE: 18 BRPM | BODY MASS INDEX: 48.29 KG/M2 | HEART RATE: 81 BPM

## 2024-03-05 DIAGNOSIS — E03.8 HYPOTHYROIDISM DUE TO HASHIMOTO'S THYROIDITIS: ICD-10-CM

## 2024-03-05 DIAGNOSIS — E78.2 MIXED HYPERLIPIDEMIA: ICD-10-CM

## 2024-03-05 DIAGNOSIS — Z12.31 ENCOUNTER FOR SCREENING MAMMOGRAM FOR MALIGNANT NEOPLASM OF BREAST: ICD-10-CM

## 2024-03-05 DIAGNOSIS — I10 ESSENTIAL HYPERTENSION: ICD-10-CM

## 2024-03-05 DIAGNOSIS — E55.9 VITAMIN D DEFICIENCY: ICD-10-CM

## 2024-03-05 DIAGNOSIS — J06.9 VIRAL URI: ICD-10-CM

## 2024-03-05 DIAGNOSIS — E06.3 HYPOTHYROIDISM DUE TO HASHIMOTO'S THYROIDITIS: ICD-10-CM

## 2024-03-05 DIAGNOSIS — E11.9 TYPE 2 DIABETES MELLITUS WITHOUT COMPLICATION, WITHOUT LONG-TERM CURRENT USE OF INSULIN (HCC): Primary | ICD-10-CM

## 2024-03-05 DIAGNOSIS — E78.1 HYPERTRIGLYCERIDEMIA: ICD-10-CM

## 2024-03-05 PROCEDURE — 99214 OFFICE O/P EST MOD 30 MIN: CPT | Performed by: NURSE PRACTITIONER

## 2024-03-05 RX ORDER — DULAGLUTIDE 1.5 MG/.5ML
1.5 INJECTION, SOLUTION SUBCUTANEOUS
Qty: 9 ML | Refills: 3 | Status: SHIPPED | OUTPATIENT
Start: 2024-03-05

## 2024-03-05 RX ORDER — OFLOXACIN 3 MG/ML
SOLUTION/ DROPS OPHTHALMIC
COMMUNITY
Start: 2024-02-11 | End: 2024-03-05

## 2024-03-05 RX ORDER — AZITHROMYCIN 500 MG/1
TABLET, FILM COATED ORAL
COMMUNITY
Start: 2024-02-11 | End: 2024-03-05

## 2024-03-05 NOTE — PROGRESS NOTES
Name: Kassidy Curran      : 1980      MRN: [id number]  Encounter Provider: SULMA Paredes  Encounter Date: 3/5/2024   Encounter department: [facility]    Assessment & Plan   1. Type 2 diabetes mellitus without complication, without long-term current use of insulin (HCC)  Controlled. Low carb diet. Regular exercise. Trulicity. Monitor blood sugar  - Hemoglobin A1C; Future  - Comprehensive metabolic panel; Future  - CBC and Platelet; Future  - Lipid Panel with Direct LDL reflex; Future  - Trulicity 1.5 MG/0.5ML injection; Inject 0.5 mL (1.5 mg total) under the skin every 7 days  Dispense: 9 mL; Refill: 3    2. Hypothyroidism due to Hashimoto's thyroiditis  Stable on current dose levothyroxine. Check labs in 6 months  - TSH, 3rd generation with Free T4 reflex; Future    3. Essential hypertension  Stable. Continue blood pressure medications as ordered.   Monitor blood pressure. Stress management. Regular exercise  Limit salt in diet.   - Comprehensive metabolic panel; Future  - CBC and Platelet; Future    4. Mixed hyperlipidemia  Heart healthy diet.   - Comprehensive metabolic panel; Future  - CBC and Platelet; Future  - Lipid Panel with Direct LDL reflex; Future    5. Vitamin D deficiency  Otc supplementation  - Comprehensive metabolic panel; Future  - CBC and Platelet; Future  - Vitamin D 25 hydroxy; Future    6. Hypertriglyceridemia  Heart healthy diet  - Comprehensive metabolic panel; Future  - CBC and Platelet; Future  - Lipid Panel with Direct LDL reflex; Future    7. Viral URI  You have been diagnosed with a viral upper respiratory infection, otherwise known as a common cold.   Fluids. Rest. Nasal saline. Supportive care for symptom management.  Tylenol or ibuprofen as needed for fever or discomfort.  Use a cool mist humidifier at bedtime.   Antibiotics are not indicated at this time.   Symptoms may persist for up to 14 days.     8. Encounter for screening mammogram for  malignant neoplasm of breast  - Mammo screening bilateral w 3d & cad; Future       Patient was counseled regarding instructions for management which included: impression/diagnosis, risk/benefits of treatment options, importance of compliance with treatment, risk factor reduction, and prognosis.   I have reviewed the instructions with the patient answering all questions and patient verbalized understanding.      Subjective      Here for follow up DM and chronic issues  Checks blood sugars daily, range 170-215  Taking all meds as prescribed.   Recent cold symptoms. At least one week. Congestion, cough. Works in school  Taking otc cold  meds      Diabetes  Pertinent negatives for hypoglycemia include no dizziness, headaches or nervousness/anxiousness. Associated symptoms include fatigue. Pertinent negatives for diabetes include no chest pain and no polydipsia.     Review of Systems   Constitutional:  Positive for fatigue.   HENT:  Positive for congestion and postnasal drip. Negative for sinus pressure and sinus pain.    Respiratory:  Positive for cough (mild).    Cardiovascular:  Negative for chest pain.   Gastrointestinal:  Negative for abdominal pain.   Endocrine: Negative for polydipsia.   Skin:  Negative for rash and wound.   Neurological:  Negative for dizziness and headaches.   Psychiatric/Behavioral:  Negative for dysphoric mood. The patient is not nervous/anxious.        Current Outpatient Medications on File Prior to Visit   Medication Sig    Blood Glucose Monitoring Suppl (ONE TOUCH ULTRA MINI) w/Device KIT USE AS DIRECTED DX: E11.8    ergocalciferol (ERGOCALCIFEROL) 1.25 MG (94782 UT) capsule     fluticasone (FLONASE) 50 mcg/act nasal spray SPRAY 1 SPRAY INTO EACH NOSTRIL EVERY DAY    glucose blood (Accu-Chek Samaria Plus) test strip Test 4 times per day    Lancets (ONETOUCH ULTRASOFT) lancets Use as instructed    levothyroxine 150 mcg tablet TAKE 1 TABLET (150 MCG TOTAL) BY MOUTH DAILY IN THE EARLY MORNING     MAGNESIUM PO Take 150 mg by mouth daily    meloxicam (MOBIC) 15 mg tablet TAKE 1 TABLET (15 MG TOTAL) BY MOUTH DAILY.    Multiple Vitamins-Minerals (MULTIVITAMIN ADULT) CHEW Chew    telmisartan (MICARDIS) 40 mg tablet TAKE 1 TABLET BY MOUTH EVERY DAY    Trulicity 1.5 MG/0.5ML injection INJECT 0.5 ML (1.5 MG TOTAL) UNDER THE SKIN EVERY 7 DAYS    venlafaxine (EFFEXOR-XR) 37.5 mg 24 hr capsule TAKE 1 CAPSULE BY MOUTH DAILY WITH BREAKFAST.    Vitamin D, Cholecalciferol, 1000 UNITS CAPS Take by mouth.    azithromycin (ZITHROMAX) 500 MG tablet take 1 tablet by mouth every day for 5 days (Patient not taking: Reported on 3/5/2024)    ofloxacin (OCUFLOX) 0.3 % ophthalmic solution ADMINISTER 1 DROP TO THE RIGHT EYE 4 TIMES A DAY FOR 10 DAYS. (Patient not taking: Reported on 3/5/2024)       Objective     Recent Results (from the past 672 hour(s))   Comprehensive metabolic panel    Collection Time: 03/02/24  8:07 AM   Result Value Ref Range    Glucose, Random 165 (H) 65 - 99 mg/dL    BUN 15 7 - 25 mg/dL    Creatinine 0.71 0.50 - 0.99 mg/dL    eGFR 107 > OR = 60 mL/min/1.73m2    SL AMB BUN/CREATININE RATIO SEE NOTE: 6 - 22 (calc)    Sodium 139 135 - 146 mmol/L    Potassium 4.2 3.5 - 5.3 mmol/L    Chloride 101 98 - 110 mmol/L    CO2 30 20 - 32 mmol/L    Calcium 8.9 8.6 - 10.2 mg/dL    Protein, Total 6.9 6.1 - 8.1 g/dL    Albumin 4.1 3.6 - 5.1 g/dL    Globulin 2.8 1.9 - 3.7 g/dL (calc)    Albumin/Globulin Ratio 1.5 1.0 - 2.5 (calc)    TOTAL BILIRUBIN 0.5 0.2 - 1.2 mg/dL    Alkaline Phosphatase 109 31 - 125 U/L    AST 16 10 - 30 U/L    ALT 16 6 - 29 U/L   Hemoglobin A1c (w/out EAG) ([facility] ONLY)    Collection Time: 03/02/24  8:07 AM   Result Value Ref Range    Hemoglobin A1C 7.0 (H) <5.7 % of total Hgb   Microalbumin, Random Urine (W/Creatinine) ([facility] ONLY)    Collection Time: 03/02/24  8:07 AM   Result Value Ref Range    Creatinine, Urine 137 20 - 275 mg/dL    Albumin,U,Random 1.6 See Note: mg/dL    Microalb/Creat Ratio 12 <30  mcg/mg creat   Labs 12/5/2023  A1C 8.1      /80 (BP Location: Right arm, Patient Position: Sitting, Cuff Size: Large)   Pulse 81   Temp 98 °F (36.7 °C)   Resp 18   Ht 5' (1.524 m)   Wt 112 kg (246 lb)   SpO2 98%   BMI 48.04 kg/m²     Physical Exam  Vitals reviewed.   Constitutional:       General: She is not in acute distress.     Appearance: She is not ill-appearing.   HENT:      Nose: Congestion present.      Mouth/Throat:      Mouth: Mucous membranes are moist.      Pharynx: Oropharynx is clear.   Eyes:      General:         Right eye: No discharge.         Left eye: No discharge.   Neck:      Vascular: No carotid bruit.   Cardiovascular:      Rate and Rhythm: Normal rate and regular rhythm.   Pulmonary:      Effort: Pulmonary effort is normal. No respiratory distress.      Breath sounds: Normal breath sounds. No wheezing or rales.   Musculoskeletal:      Cervical back: Normal range of motion.   Lymphadenopathy:      Cervical: No cervical adenopathy.   Skin:     General: Skin is warm and dry.      Coloration: Skin is not jaundiced or pale.   Neurological:      General: No focal deficit present.      Mental Status: She is alert and oriented to person, place, and time.      Cranial Nerves: No cranial nerve deficit.      Sensory: No sensory deficit.   Psychiatric:         Mood and Affect: Mood normal.         Behavior: Behavior normal.         Thought Content: Thought content normal.         Judgment: Judgment normal.       SULMA Paredes

## 2024-03-05 NOTE — PATIENT INSTRUCTIONS
Monitor blood sugar and bring diary of readings to next appointment.   Carbohydrate controlled, heart healthy diet.   Continue diabetic medications as ordered.   Increase regular exercise: Consider such things as walking, biking, strength training , etc.     For URI:  Fluids. Rest. Nasal saline. Supportive care for symptom management.  Tylenol or ibuprofen as needed for fever or discomfort.  Use a cool mist humidifier at bedtime.   Antibiotics are not indicated at this time.   Symptoms may persist for up to 14 days.

## 2024-06-10 DIAGNOSIS — F41.9 ANXIETY: ICD-10-CM

## 2024-06-10 RX ORDER — VENLAFAXINE HYDROCHLORIDE 37.5 MG/1
37.5 CAPSULE, EXTENDED RELEASE ORAL DAILY
Qty: 90 CAPSULE | Refills: 0 | Status: SHIPPED | OUTPATIENT
Start: 2024-06-10 | End: 2024-09-08

## 2024-07-12 DIAGNOSIS — I10 ESSENTIAL HYPERTENSION: ICD-10-CM

## 2024-07-12 RX ORDER — TELMISARTAN 40 MG/1
40 TABLET ORAL DAILY
Qty: 100 TABLET | Refills: 1 | Status: SHIPPED | OUTPATIENT
Start: 2024-07-12

## 2024-07-19 ENCOUNTER — HOSPITAL ENCOUNTER (OUTPATIENT)
Age: 44
Discharge: HOME | End: 2024-07-19
Payer: COMMERCIAL

## 2024-07-19 VITALS — WEIGHT: 246 LBS | HEIGHT: 60 IN | BODY MASS INDEX: 48.29 KG/M2

## 2024-07-19 DIAGNOSIS — Z12.31 ENCOUNTER FOR SCREENING MAMMOGRAM FOR MALIGNANT NEOPLASM OF BREAST: ICD-10-CM

## 2024-07-19 PROCEDURE — 77063 BREAST TOMOSYNTHESIS BI: CPT

## 2024-07-19 PROCEDURE — 77067 SCR MAMMO BI INCL CAD: CPT

## 2024-07-22 ENCOUNTER — TELEPHONE (OUTPATIENT)
Dept: FAMILY MEDICINE CLINIC | Facility: CLINIC | Age: 44
End: 2024-07-22

## 2024-07-22 NOTE — TELEPHONE ENCOUNTER
----- Message from SULMA Paredes sent at 7/22/2024 11:20 AM EDT -----  Please call pt and advise that mammogram was normal. Repeat in one year.

## 2024-08-01 DIAGNOSIS — H65.191 ACUTE EFFUSION OF RIGHT EAR: ICD-10-CM

## 2024-08-01 RX ORDER — FLUTICASONE PROPIONATE 50 MCG
SPRAY, SUSPENSION (ML) NASAL
Qty: 48 ML | Refills: 1 | Status: SHIPPED | OUTPATIENT
Start: 2024-08-01

## 2024-08-13 ENCOUNTER — APPOINTMENT (OUTPATIENT)
Dept: RADIOLOGY | Facility: CLINIC | Age: 44
End: 2024-08-13
Payer: COMMERCIAL

## 2024-08-13 ENCOUNTER — OFFICE VISIT (OUTPATIENT)
Dept: FAMILY MEDICINE CLINIC | Facility: CLINIC | Age: 44
End: 2024-08-13
Payer: COMMERCIAL

## 2024-08-13 VITALS
SYSTOLIC BLOOD PRESSURE: 128 MMHG | DIASTOLIC BLOOD PRESSURE: 80 MMHG | TEMPERATURE: 96.4 F | RESPIRATION RATE: 18 BRPM | BODY MASS INDEX: 50.19 KG/M2 | HEART RATE: 86 BPM | OXYGEN SATURATION: 98 % | WEIGHT: 257 LBS

## 2024-08-13 DIAGNOSIS — E66.01 MORBID OBESITY (HCC): ICD-10-CM

## 2024-08-13 DIAGNOSIS — R94.31 ABNORMAL EKG: ICD-10-CM

## 2024-08-13 DIAGNOSIS — R06.02 SOB (SHORTNESS OF BREATH): ICD-10-CM

## 2024-08-13 DIAGNOSIS — R06.02 SOB (SHORTNESS OF BREATH): Primary | ICD-10-CM

## 2024-08-13 DIAGNOSIS — Z86.79 HISTORY OF ATRIAL FLUTTER: ICD-10-CM

## 2024-08-13 DIAGNOSIS — E11.9 TYPE 2 DIABETES MELLITUS WITHOUT COMPLICATION, WITHOUT LONG-TERM CURRENT USE OF INSULIN (HCC): ICD-10-CM

## 2024-08-13 PROCEDURE — 71046 X-RAY EXAM CHEST 2 VIEWS: CPT

## 2024-08-13 PROCEDURE — 99214 OFFICE O/P EST MOD 30 MIN: CPT | Performed by: NURSE PRACTITIONER

## 2024-08-13 PROCEDURE — 93000 ELECTROCARDIOGRAM COMPLETE: CPT | Performed by: NURSE PRACTITIONER

## 2024-08-13 NOTE — PATIENT INSTRUCTIONS
Oxygen level today in the office was 98%  EKG did show some mild abnormalities  Chest xray to be done today  Schedule appt with cardiology for further evaluation.   Call or return to office if symptoms worsen or if new symptoms develop.

## 2024-08-13 NOTE — PROGRESS NOTES
Ambulatory Visit  Name: Kassidy Curran      : 1980      MRN: [id number]  Encounter Provider: SULMA Paredes  Encounter Date: 2024   Encounter department: [facility]    Assessment & Plan   1. SOB (shortness of breath)  - POCT ECG- NSR with Premature supraventricular complexes, incomplete right bundle branch block, rate 80  - XR chest pa & lateral; Future  - Ambulatory Referral to Cardiology; Future  Oxygen level 98%. Lungs clear, no dyspnea at rest, will check CXR. Will refer to cardio for further evaluation/work up    2. History of atrial flutter  - Ambulatory Referral to Cardiology; Future    3. Type 2 diabetes mellitus without complication, without long-term current use of insulin (HCC)  Controlled. Continue current meds. Monitor   - Ambulatory Referral to Cardiology; Future    4. Morbid obesity (HCC)  Weight loss is recommended to improve overall health.   Dietary changes- limit carbohydrates, decrease overall caloric intake, reduce portion sizes, healthier snack choices, limit saturated fats, increase intake of fruits and vegetables, limit junk food.   exercise to 3-5 times per week. Consider adding strength exercises to exercise routine.     5. Abnormal EKG  - Ambulatory Referral to Cardiology; Future   EKG- NSR with Premature supraventricular complexes, incomplete right bundle branch block, rate 80      History of Present Illness     Here for sob  Symptoms for about 3 weeks.   Worse with exertion. Unable to swim length of pool which normally is not a problem.   Denies chest pain. No dizziness or lightheadedness. No leg swelling  History of episode of atrial flutter about 6 years ago, no recurrence. Does not see cardiology.             Review of Systems   Constitutional:  Negative for fatigue.   Respiratory:  Positive for shortness of breath. Negative for chest tightness and wheezing.    Cardiovascular:  Negative for chest pain, palpitations and leg swelling.    Gastrointestinal:  Negative for abdominal pain.   Skin:  Negative for color change and pallor.   Neurological:  Negative for dizziness and light-headedness.       Objective     /80   Pulse 86   Temp (!) 96.4 °F (35.8 °C)   Resp 18   Wt 117 kg (257 lb)   LMP 06/23/2024 (Approximate)   SpO2 98%   BMI 50.19 kg/m²     Physical Exam  Vitals reviewed.   Constitutional:       General: She is not in acute distress.     Appearance: She is obese. She is not ill-appearing.   Neck:      Vascular: No carotid bruit.   Cardiovascular:      Rate and Rhythm: Normal rate and regular rhythm.   Pulmonary:      Effort: Pulmonary effort is normal. No respiratory distress.      Breath sounds: Normal breath sounds. No wheezing or rales.      Comments: O2 sat 98%  No conversational dyspnea  No dyspnea at rest  Abdominal:      General: There is no distension.      Palpations: Abdomen is soft.   Musculoskeletal:      Cervical back: Normal range of motion.      Right lower leg: No edema.      Left lower leg: No edema.   Skin:     General: Skin is warm and dry.      Coloration: Skin is not jaundiced or pale.   Neurological:      General: No focal deficit present.      Mental Status: She is alert and oriented to person, place, and time.      Cranial Nerves: No cranial nerve deficit.      Sensory: No sensory deficit.   Psychiatric:         Mood and Affect: Mood normal.         Behavior: Behavior normal.         Thought Content: Thought content normal.         Judgment: Judgment normal.       Administrative Statements

## 2024-08-16 ENCOUNTER — TELEPHONE (OUTPATIENT)
Dept: FAMILY MEDICINE CLINIC | Facility: CLINIC | Age: 44
End: 2024-08-16

## 2024-08-16 NOTE — TELEPHONE ENCOUNTER
----- Message from SULMA Paredes sent at 8/16/2024 10:05 AM EDT -----  Please call pt to advise that CXR was reviewed  No signs of pneumonia or heart failure  Heart is very slightly enlarged and there is slight vascular congestion.   I think the best course of action is to be evaluated by cardiology as discussed to determine if there are any underlying cardiac issues to be addressed.   As previously advised, go to ED if develops any significant sob or chest pain.   
LMOM for patient to call us back   Okay to give message if patient calls back   
Pt returned call, message in chart from provider relayed.  Pt verbalized understanding and had no further questions.    
SURG

## 2024-08-29 LAB
LEFT EYE DIABETIC RETINOPATHY: NORMAL
RIGHT EYE DIABETIC RETINOPATHY: NORMAL

## 2024-09-01 LAB
25(OH)D3 SERPL-MCNC: 22 NG/ML (ref 30–100)
ALBUMIN SERPL-MCNC: 3.9 G/DL (ref 3.6–5.1)
ALBUMIN/GLOB SERPL: 1.5 (CALC) (ref 1–2.5)
ALP SERPL-CCNC: 97 U/L (ref 31–125)
ALT SERPL-CCNC: 15 U/L (ref 6–29)
AST SERPL-CCNC: 15 U/L (ref 10–35)
BASOPHILS # BLD AUTO: 18 CELLS/UL (ref 0–200)
BASOPHILS NFR BLD AUTO: 0.2 %
BILIRUB SERPL-MCNC: 0.5 MG/DL (ref 0.2–1.2)
BUN SERPL-MCNC: 12 MG/DL (ref 7–25)
BUN/CREAT SERPL: ABNORMAL (CALC) (ref 6–22)
CALCIUM SERPL-MCNC: 9.1 MG/DL (ref 8.6–10.2)
CHLORIDE SERPL-SCNC: 101 MMOL/L (ref 98–110)
CHOLEST SERPL-MCNC: 158 MG/DL
CHOLEST/HDLC SERPL: 5.9 (CALC)
CO2 SERPL-SCNC: 27 MMOL/L (ref 20–32)
CREAT SERPL-MCNC: 0.72 MG/DL (ref 0.5–0.99)
EOSINOPHIL # BLD AUTO: 100 CELLS/UL (ref 15–500)
EOSINOPHIL NFR BLD AUTO: 1.1 %
ERYTHROCYTE [DISTWIDTH] IN BLOOD BY AUTOMATED COUNT: 14.3 % (ref 11–15)
GFR/BSA.PRED SERPLBLD CYS-BASED-ARV: 105 ML/MIN/1.73M2
GLOBULIN SER CALC-MCNC: 2.6 G/DL (CALC) (ref 1.9–3.7)
GLUCOSE SERPL-MCNC: 162 MG/DL (ref 65–99)
HBA1C MFR BLD: 7.6 % OF TOTAL HGB
HCT VFR BLD AUTO: 36.1 % (ref 35–45)
HDLC SERPL-MCNC: 27 MG/DL
HGB BLD-MCNC: 11.5 G/DL (ref 11.7–15.5)
LDLC SERPL CALC-MCNC: 102 MG/DL (CALC)
LYMPHOCYTES # BLD AUTO: 2057 CELLS/UL (ref 850–3900)
LYMPHOCYTES NFR BLD AUTO: 22.6 %
MCH RBC QN AUTO: 27.7 PG (ref 27–33)
MCHC RBC AUTO-ENTMCNC: 31.9 G/DL (ref 32–36)
MCV RBC AUTO: 87 FL (ref 80–100)
MONOCYTES # BLD AUTO: 464 CELLS/UL (ref 200–950)
MONOCYTES NFR BLD AUTO: 5.1 %
NEUTROPHILS # BLD AUTO: 6461 CELLS/UL (ref 1500–7800)
NEUTROPHILS NFR BLD AUTO: 71 %
NONHDLC SERPL-MCNC: 131 MG/DL (CALC)
PLATELET # BLD AUTO: 273 THOUSAND/UL (ref 140–400)
PMV BLD REES-ECKER: 10 FL (ref 7.5–12.5)
POTASSIUM SERPL-SCNC: 4.3 MMOL/L (ref 3.5–5.3)
PROT SERPL-MCNC: 6.5 G/DL (ref 6.1–8.1)
RBC # BLD AUTO: 4.15 MILLION/UL (ref 3.8–5.1)
SODIUM SERPL-SCNC: 137 MMOL/L (ref 135–146)
TRIGL SERPL-MCNC: 174 MG/DL
TSH SERPL-ACNC: 2.57 MIU/L
WBC # BLD AUTO: 9.1 THOUSAND/UL (ref 3.8–10.8)

## 2024-09-01 PROCEDURE — 3051F HG A1C>EQUAL 7.0%<8.0%: CPT | Performed by: NURSE PRACTITIONER

## 2024-09-05 ENCOUNTER — OFFICE VISIT (OUTPATIENT)
Dept: FAMILY MEDICINE CLINIC | Facility: CLINIC | Age: 44
End: 2024-09-05
Payer: COMMERCIAL

## 2024-09-05 VITALS
WEIGHT: 255 LBS | TEMPERATURE: 97.3 F | RESPIRATION RATE: 18 BRPM | BODY MASS INDEX: 49.8 KG/M2 | SYSTOLIC BLOOD PRESSURE: 124 MMHG | HEART RATE: 74 BPM | DIASTOLIC BLOOD PRESSURE: 76 MMHG

## 2024-09-05 DIAGNOSIS — E78.1 HYPERTRIGLYCERIDEMIA: ICD-10-CM

## 2024-09-05 DIAGNOSIS — E06.3 HYPOTHYROIDISM DUE TO HASHIMOTO'S THYROIDITIS: ICD-10-CM

## 2024-09-05 DIAGNOSIS — R53.83 OTHER FATIGUE: ICD-10-CM

## 2024-09-05 DIAGNOSIS — E66.01 MORBID OBESITY (HCC): ICD-10-CM

## 2024-09-05 DIAGNOSIS — E11.9 TYPE 2 DIABETES MELLITUS WITHOUT COMPLICATION, WITHOUT LONG-TERM CURRENT USE OF INSULIN (HCC): Primary | ICD-10-CM

## 2024-09-05 DIAGNOSIS — E03.8 HYPOTHYROIDISM DUE TO HASHIMOTO'S THYROIDITIS: ICD-10-CM

## 2024-09-05 DIAGNOSIS — R06.02 SOB (SHORTNESS OF BREATH): ICD-10-CM

## 2024-09-05 DIAGNOSIS — E78.2 MIXED HYPERLIPIDEMIA: ICD-10-CM

## 2024-09-05 DIAGNOSIS — E55.9 VITAMIN D DEFICIENCY: ICD-10-CM

## 2024-09-05 DIAGNOSIS — Z12.11 SCREENING FOR MALIGNANT NEOPLASM OF COLON: ICD-10-CM

## 2024-09-05 DIAGNOSIS — I10 ESSENTIAL HYPERTENSION: ICD-10-CM

## 2024-09-05 PROCEDURE — 99214 OFFICE O/P EST MOD 30 MIN: CPT | Performed by: NURSE PRACTITIONER

## 2024-09-05 NOTE — PATIENT INSTRUCTIONS
Increase Trulicity to 3mg weekly  Increase vitamin D3 to 2000 units daily  Keep appt with cardiology as scheduled.   Continue all other meds.   Repeat labs in 3 months.   Heart healthy, carbohydrate controlled diet  Schedule appt with gastroenterology for colonoscopy

## 2024-09-05 NOTE — PROGRESS NOTES
Ambulatory Visit  Name: Kassidy Curran      : 1980      MRN: [id number]  Encounter Provider: SULMA Paredes  Encounter Date: 2024   Encounter department: [facility]    Assessment & Plan   1. Type 2 diabetes mellitus without complication, without long-term current use of insulin (HCC)  Will increase Trulicity to 3mg weekly  Carb controlled diet  Check labs in 3 months.   - dulaglutide (Trulicity) 3 MG/0.5ML injection; Inject 0.5 mL (3 mg total) under the skin every 7 days  Dispense: 6 mL; Refill: 1  - Hemoglobin A1c (w/out EAG) ([facility] ONLY); Future  - Comprehensive metabolic panel; Future    2. Hypothyroidism due to Hashimoto's thyroiditis  Will repeat TSH in 3 months.   Continue levothyroxine 150mcg daily  - TSH, 3rd generation with Free T4 reflex; Future    3. Mixed hyperlipidemia  Heart healthy diet.   Should consider statin.   Weight loss. Regular exercise    4. Morbid obesity (HCC)  Weight loss is recommended to improve overall health.   Dietary changes- limit carbohydrates, decrease overall caloric intake, reduce portion sizes, healthier snack choices, limit saturated fats, increase intake of fruits and vegetables, limit junk food.   exercise to 3-5 times per week. Consider adding strength exercises to exercise routine.     5. Vitamin D deficiency  Increase vitamin D3 2000 units daily    6. Hypertriglyceridemia  Heart healthy diet. Consider statin. Regular exercise. Weight loss.     7. Essential hypertension  Stable. Continue blood pressure medications as ordered.   Monitor blood pressure. Stress management. Regular exercise  Limit salt in diet.     8. Screening for malignant neoplasm of colon  - Ambulatory Referral to Gastroenterology; Future    9. SOB (shortness of breath)  Etiology unclear.   Cardio eval highly recommended- many risk factors- age, weight, DM, generalized deconditioning    10. Other fatigue  Etiology unclear. TSH wnl. Vitamin D level low. Generally  deconditioned.   Will increase otc vitamin D dose to 2000 units daily  Eval by cardio  Repeat TSH in 3 months.        History of Present Illness     Here for follow up DM/chronic and med check  On trulicity 1.5mg weekly. Does not check blood sugars regularly  On vitamin D 1000 units  SOB intermittent for last couple of months. CXR did show mildly enlarged heart. Does have appt with cardio early November.   Has noticed that when is physically active, heart rate elevates to 130 and feels sob.   On meds for thyroid. Taking as prescribed  Has been more tired than usual lately.           Review of Systems   Constitutional:  Positive for fatigue.   Respiratory:  Positive for shortness of breath. Negative for chest tightness.    Cardiovascular:  Negative for chest pain and palpitations.   Gastrointestinal:  Negative for abdominal pain and blood in stool.   Endocrine: Negative for polydipsia and polyuria.   Skin:  Negative for rash and wound.   Neurological:  Negative for dizziness and headaches.   Psychiatric/Behavioral:  Negative for dysphoric mood. The patient is not nervous/anxious.        Objective     /76   Pulse 74   Temp (!) 97.3 °F (36.3 °C)   Resp 18   Wt 116 kg (255 lb)   LMP  (LMP Unknown)   BMI 49.80 kg/m²     Recent Results (from the past 672 hour(s))   Lipid Panel with Direct LDL reflex    Collection Time: 08/31/24  8:33 AM   Result Value Ref Range    Total Cholesterol 158 <200 mg/dL    HDL 27 (L) > OR = 50 mg/dL    Triglycerides 174 (H) <150 mg/dL    LDL Calculated 102 (H) mg/dL (calc)    Chol HDLC Ratio 5.9 (H) <5.0 (calc)    Non-HDL Cholesterol 131 (H) <130 mg/dL (calc)   Comprehensive metabolic panel    Collection Time: 08/31/24  8:33 AM   Result Value Ref Range    Glucose, Random 162 (H) 65 - 99 mg/dL    BUN 12 7 - 25 mg/dL    Creatinine 0.72 0.50 - 0.99 mg/dL    eGFR 105 > OR = 60 mL/min/1.73m2    SL AMB BUN/CREATININE RATIO SEE NOTE: 6 - 22 (calc)    Sodium 137 135 - 146 mmol/L    Potassium  4.3 3.5 - 5.3 mmol/L    Chloride 101 98 - 110 mmol/L    CO2 27 20 - 32 mmol/L    Calcium 9.1 8.6 - 10.2 mg/dL    Protein, Total 6.5 6.1 - 8.1 g/dL    Albumin 3.9 3.6 - 5.1 g/dL    Globulin 2.6 1.9 - 3.7 g/dL (calc)    Albumin/Globulin Ratio 1.5 1.0 - 2.5 (calc)    TOTAL BILIRUBIN 0.5 0.2 - 1.2 mg/dL    Alkaline Phosphatase 97 31 - 125 U/L    AST 15 10 - 35 U/L    ALT 15 6 - 29 U/L   CBC and differential    Collection Time: 08/31/24  8:33 AM   Result Value Ref Range    White Blood Cell Count 9.1 3.8 - 10.8 Thousand/uL    Red Blood Cell Count 4.15 3.80 - 5.10 Million/uL    Hemoglobin 11.5 (L) 11.7 - 15.5 g/dL    HCT 36.1 35.0 - 45.0 %    MCV 87.0 80.0 - 100.0 fL    MCH 27.7 27.0 - 33.0 pg    MCHC 31.9 (L) 32.0 - 36.0 g/dL    RDW 14.3 11.0 - 15.0 %    Platelet Count 273 140 - 400 Thousand/uL    SL AMB MPV 10.0 7.5 - 12.5 fL    Neutrophils (Absolute) 6,461 1,500 - 7,800 cells/uL    Lymphocytes (Absolute) 2,057 850 - 3,900 cells/uL    Monocytes (Absolute) 464 200 - 950 cells/uL    Eosinophils (Absolute) 100 15 - 500 cells/uL    Basophils ABS 18 0 - 200 cells/uL    Neutrophils 71 %    Lymphocytes 22.6 %    Monocytes 5.1 %    Eosinophils 1.1 %    Basophils PCT 0.2 %    Always Message     TSH, 3rd generation with Free T4 reflex    Collection Time: 08/31/24  8:33 AM   Result Value Ref Range    TSH W/RFX TO FREE T4 2.57 mIU/L   Vitamin D 25 hydroxy    Collection Time: 08/31/24  8:33 AM   Result Value Ref Range    Vitamin D, 25-Hydroxy, Serum 22 (L) 30 - 100 ng/mL   Hemoglobin A1c (w/out EAG)    Collection Time: 08/31/24  8:33 AM   Result Value Ref Range    Hemoglobin A1C 7.6 (H) <5.7 % of total Hgb     Reviewed lab/diagnostic results with pt including both normal and abnormal findings.   In depth counseling and instructions given. All questions answered during visit.     Physical Exam  Vitals reviewed.   Constitutional:       General: She is not in acute distress.     Appearance: She is obese. She is not ill-appearing.    Neck:      Vascular: No carotid bruit.   Cardiovascular:      Rate and Rhythm: Normal rate and regular rhythm.   Pulmonary:      Effort: Pulmonary effort is normal. No respiratory distress.      Breath sounds: Normal breath sounds. No wheezing or rales.   Abdominal:      General: There is no distension.      Palpations: Abdomen is soft.   Musculoskeletal:      Cervical back: Normal range of motion.   Skin:     General: Skin is warm and dry.      Coloration: Skin is not jaundiced or pale.   Neurological:      General: No focal deficit present.      Mental Status: She is alert and oriented to person, place, and time.      Cranial Nerves: No cranial nerve deficit.      Sensory: No sensory deficit.   Psychiatric:         Mood and Affect: Mood normal.         Behavior: Behavior normal.         Thought Content: Thought content normal.         Judgment: Judgment normal.       Administrative Statements

## 2024-09-06 ENCOUNTER — TELEPHONE (OUTPATIENT)
Dept: ADMINISTRATIVE | Facility: OTHER | Age: 44
End: 2024-09-06

## 2024-09-06 DIAGNOSIS — F41.9 ANXIETY: ICD-10-CM

## 2024-09-06 RX ORDER — VENLAFAXINE HYDROCHLORIDE 37.5 MG/1
37.5 CAPSULE, EXTENDED RELEASE ORAL DAILY
Qty: 90 CAPSULE | Refills: 1 | Status: SHIPPED | OUTPATIENT
Start: 2024-09-06

## 2024-09-06 NOTE — TELEPHONE ENCOUNTER
----- Message from Ana SHIELDS sent at 9/5/2024  4:24 PM EDT -----  09/05/24 4:25 PM    Hello, our patient Kassidy Curran has had Diabetic Eye Exam completed/performed. Please assist in updating the patient chart by making an External outreach to [facility] facility located in [place], [place]. The date of service is 8/29/2024.    Thank you,  TENNILLE Childs PG

## 2024-09-06 NOTE — LETTER
Diabetic Eye Exam Form    Date Requested: 09/10/24  Patient: Kassidy Curran  Patient : 1980   Referring Provider: SULMA Paredes      DIABETIC Eye Exam Date _______________________________      Type of Exam MUST be documented for Diabetic Eye Exams. Please CHECK ONE.     Retinal Exam       Dilated Retinal Exam       OCT       Optomap-Iris Exam      Fundus Photography       Left Eye - Please check Retinopathy or No Retinopathy        Exam did show retinopathy    Exam did not show retinopathy       Right Eye - Please check Retinopathy or No Retinopathy       Exam did show retinopathy    Exam did not show retinopathy       Comments __________________________________________________________    Practice Providing Exam ______________________________________________    Exam Performed By (print name) _______________________________________      Provider Signature ___________________________________________________      These reports are needed for  compliance.  Please fax this completed form and a copy of the Diabetic Eye Exam report to our office located at [address] [place] [address] as soon as possible via Fax [phone] attention Arielle: Phone [phone]  We thank you for your assistance in treating our mutual patient.

## 2024-09-10 ENCOUNTER — TELEPHONE (OUTPATIENT)
Age: 44
End: 2024-09-10

## 2024-09-10 NOTE — TELEPHONE ENCOUNTER
PA for Trulicity SUBMITTED     via    [][facility]-KEY:    [x][employer]-Case ID # [id number]   []Faxed to plan   []Other website    []Phone call Case ID #      Office notes sent, clinical questions answered. Awaiting determination    Turnaround time for your insurance to make a decision on your Prior Authorization can take 7-21 business days.

## 2024-09-10 NOTE — TELEPHONE ENCOUNTER
Upon review of the In Basket request and the patient's chart, initial outreach has been made via fax to facility. Please see Contacts section for details.     Thank you  Arielle Arreola MA

## 2024-09-10 NOTE — TELEPHONE ENCOUNTER
PA for trulicity  NOT REQUIRED     ReasonNote from payer: Your PA request has been closed. TRULICITY 3/0.5 INJ #6/30 days // Mock Claim Paid //  E11.9-Type 2 diabetes mellitus without complications //Aborted No PA required at this time.           Patient advised by          [] [employer] Message  [] Phone call   [x]LMOM  []L/M to call office as no active Communication consent on file  []Unable to leave detailed message as VM not approved on Communication consent       Pharmacy advised by    [x]Fax  []Phone call

## 2024-09-11 ENCOUNTER — VBI (OUTPATIENT)
Dept: ADMINISTRATIVE | Facility: OTHER | Age: 44
End: 2024-09-11

## 2024-09-30 NOTE — TELEPHONE ENCOUNTER
As a final attempt, a third outreach has been made via telephone call to facility. The outcome of the telephone call was a fax request form to be sent to Office/Facility. Please see Contacts section for details. This encounter will be closed and completed by end of day. Should we receive the requested information because of previous outreach attempts, the requested patient's chart will be updated appropriately.     Thank you  Arielle Arreola MA

## 2024-10-01 LAB
LEFT EYE DIABETIC RETINOPATHY: NORMAL
RIGHT EYE DIABETIC RETINOPATHY: NORMAL
SEVERITY (EYE EXAM): NORMAL

## 2024-10-04 DIAGNOSIS — M72.2 PLANTAR FASCIITIS OF RIGHT FOOT: ICD-10-CM

## 2024-10-04 RX ORDER — MELOXICAM 15 MG/1
15 TABLET ORAL DAILY
Qty: 90 TABLET | Refills: 1 | Status: SHIPPED | OUTPATIENT
Start: 2024-10-04

## 2024-10-09 NOTE — PROGRESS NOTES
Progress Note - Cardiology Office  [facility]    Kassidy Curran 44 y.o. female MRN: [id number]  : 1980  Encounter: [id number]      ASSESSMENT:  Left-sided chest pain/tightness  Was seen at [facility]'s ED on 2023 and was scheduled/advised for outpatient stress test  Cardiac work-up in the ED was negative     Paroxysmal atrial flutter  Failed flecainide  S/p DCCV  S/p ablation on 9/10/2018 at [facility] and metoprolol were discontinued on 10/8/2018  In normal sinus rhythm today    Lexiscan, 2023:  No significant reversible ischemia.  Probable breast attenuation artifact  EF 60%    TTE, 2023:  EF 55%, moderately increased wall thickness, normal DF  Moderate LAE, mild MR     TTE, 2018: At [facility]  EF 50%, moderate LVH mild MR     Morbid obesity, BMI 48.82  Severe ADOLPH, on CPAP, has been having issues with CPAP     Type 2 diabetes mellitus    Mixed hyperlipidemia  2024: , , HDL 27, Normal AST, ALT  Add atorvastatin 20 mg and fish oil 2 g     Hypothyroidism due to Hashimoto's thyroiditis        RECOMMENDATIONS:  Add fish oil 2 g daily  Atorvastatin 20 mg  Lipid and hepatic function panel in 3 to 6 months  Low-fat low-cholesterol diet  Regular cardiovascular exercise  Weight loss strategies  Pulmonary follow-up for CPAP management      Please call [phone] if any questions.    HPI :     Kassidy Curran is a 44 y.o. year old female who came for follow up.  Patient has been having some issues when she wakes up and she thinks it is related to her CPAP.  I advised her to see pulmonologist for adjustment.  Her LDL and triglycerides are elevated for which I am going to start her on medications.  I have also advised her on diet, exercise and weight loss    REVIEW OF SYSTEMS:  Review of Systems   Respiratory:  Positive for shortness of breath (Dyspnea on exertion).    All other systems reviewed and are negative.        Historical Information    Past Medical History:   Diagnosis Date    Abnormal blood chemistry     Allergic rhinitis     Antepartum diabetes mellitus     Bilateral chronic serous otitis media 2020    Cough     Disease of thyroid gland     HYPOTHYROID    Elevated blood pressure reading     Gastritis     Generalized anxiety disorder     History of asthma     History of diabetes mellitus     History of dysfunctional uterine bleeding     History of ear pain     left    History of edema     History of gestational diabetes     History of hyperlipidemia     History of hypothyroidism     History of polycystic ovarian syndrome     History of sore throat     History of upper respiratory infection     Migraines     Morbidly obese (HCC)     Nasal congestion     Nonspecific abnormal finding     Obesity     Polycystic ovary     Postsurgical dumping syndrome     Seasonal allergies     Sleep apnea     on CPAP    Sleep difficulties     Suspected fetal anomaly not found     Tinnitus      Past Surgical History:   Procedure Laterality Date    ADENOIDECTOMY      CARDIAC ELECTROPHYSIOLOGY STUDY AND ABLATION       SECTION      CHOLECYSTECTOMY      FOOT SURGERY      GALLBLADDER SURGERY      MOUTH SURGERY      UT ESOPHAGOGASTRODUODENOSCOPY TRANSORAL DIAGNOSTIC N/A 2016    Procedure: ESOPHAGOGASTRODUODENOSCOPY (EGD);  Surgeon: Becki Patrick MD;  Location: [facility];  Service: General    TONSILLECTOMY      TOOTH EXTRACTION      WISDOM TOOTH EXTRACTION       Social History     Substance and Sexual Activity   Alcohol Use Yes    Comment: social     Social History     Substance and Sexual Activity   Drug Use No     Social History     Tobacco Use   Smoking Status Never    Passive exposure: Past   Smokeless Tobacco Never     Family History:   Family History   Problem Relation Age of Onset    Heart disease Mother     Hypertension Mother     Valvular heart disease Mother     Breast cancer Mother 65    No Known Problems Daughter     Breast cancer Maternal  Grandmother 75        triple neg    Diabetes Maternal Grandmother     Hypertension Maternal Grandmother     Diabetes Maternal Grandfather     No Known Problems Paternal Grandmother     No Known Problems Paternal Grandfather     No Known Problems Son     No Known Problems Maternal Aunt     No Known Problems Maternal Aunt     No Known Problems Paternal Aunt     No Known Problems Paternal Aunt     Arthritis Family     Varicose Veins Family         of lower extremities       Meds/Allergies     Allergies   Allergen Reactions    Jardiance [Empagliflozin] Rash    Albuterol      SYNCOPE    Cat Hair Extract     Dog Epithelium     Metformin Diarrhea    Other      ADHESIVE/WELTS    Pollen Extract        Current Outpatient Medications:     atorvastatin (LIPITOR) 20 mg tablet, Take 1 tablet (20 mg total) by mouth daily, Disp: 90 tablet, Rfl: 2    Blood Glucose Monitoring Suppl (ONE TOUCH ULTRA MINI) w/Device KIT, USE AS DIRECTED DX: E11.8, Disp: , Rfl: 0    dulaglutide (Trulicity) 3 MG/0.5ML injection, Inject 0.5 mL (3 mg total) under the skin every 7 days, Disp: 6 mL, Rfl: 1    fluticasone (FLONASE) 50 mcg/act nasal spray, SPRAY 1 SPRAY INTO EACH NOSTRIL EVERY DAY, Disp: 48 mL, Rfl: 1    glucose blood (Accu-Chek Samaria Plus) test strip, Test 4 times per day, Disp: 100 each, Rfl: 1    Lancets (ONETOUCH ULTRASOFT) lancets, Use as instructed, Disp: 100 each, Rfl: 0    levothyroxine 150 mcg tablet, TAKE 1 TABLET (150 MCG TOTAL) BY MOUTH DAILY IN THE EARLY MORNING, Disp: 90 tablet, Rfl: 2    meloxicam (MOBIC) 15 mg tablet, TAKE 1 TABLET (15 MG TOTAL) BY MOUTH DAILY., Disp: 90 tablet, Rfl: 1    Multiple Vitamins-Minerals (MULTIVITAMIN ADULT) CHEW, Chew, Disp: , Rfl:     Omega-3 Fatty Acids (fish oil) 1,000 mg, Take 1 capsule (1,000 mg total) by mouth 2 (two) times a day, Disp: 180 capsule, Rfl: 4    telmisartan (MICARDIS) 40 mg tablet, TAKE 1 TABLET BY MOUTH EVERY DAY, Disp: 100 tablet, Rfl: 1    venlafaxine (EFFEXOR-XR) 37.5 mg 24 hr  capsule, TAKE 1 CAPSULE BY MOUTH EVERY DAY, Disp: 90 capsule, Rfl: 1    Vitamin D, Cholecalciferol, 1000 UNITS CAPS, Take by mouth., Disp: , Rfl:     MAGNESIUM PO, Take 150 mg by mouth daily, Disp: , Rfl:     Vitals: Blood pressure 122/80, pulse 73, height 5' (1.524 m), weight 113 kg (250 lb), SpO2 97%.    Body mass index is 48.82 kg/m².  Vitals:    10/10/24 0754   Weight: 113 kg (250 lb)     BP Readings from Last 3 Encounters:   10/10/24 122/80   09/05/24 124/76   08/13/24 128/80       Physical Exam:  Physical Exam    Neurologic:  Alert & oriented x 3, no new focal deficits, Not in any acute distress,  Constitutional: Morbid obesity  Eyes:  Pupil equal and reacting to light, conjunctiva normal,   HENT:  Atraumatic, oropharynx moist, Neck- normal range of motion, no tenderness,  Neck supple, No JVP, No LNP   Respiratory:  Bilateral air entry, mostly clear to auscultation  Cardiovascular: S1-S2 regular with a I/VI systolic murmur   GI:  Soft, nondistended, normal bowel sounds, nontender, no hepatosplenomegaly appreciated.  Musculoskeletal:  No tenderness, no deformities.   Skin:  Well hydrated, no rash   Lymphatic:  No lymphadenopathy noted   Extremities:  No edema and distal pulses are present        Diagnostic Studies Review Cardio:      EKG: Normal sinus rhythm, heart rate 73/min, iRBBB    Cardiac testing:     Results for orders placed during the hospital encounter of 07/25/23    Echo complete w/ contrast if indicated    Interpretation Summary    Left Ventricle: Left ventricular cavity size is normal. Wall thickness is moderately increased. The left ventricular ejection fraction is 55% by visual estimation.. Systolic function is normal. Wall motion is normal. Diastolic function is normal for age.  Left atrial filling pressure is normal.    Left Atrium: The atrium is moderately dilated (42-48 mL/m2).    Mitral Valve: There is mild regurgitation.    Tricuspid Valve: Right ventricular systolic pressure could not be  "assessed.    Results for orders placed during the hospital encounter of 07/25/23    NM myocardial perfusion spect (rx stress and/or rest)    Interpretation Summary    Stress Combined Conclusion: The ECG and SPECT imaging portions of the stress study are concordant with no evidence of stress induced myocardial ischemia.    The stress ECG is negative for ischemia after vasodilation and low level exercise, without reproduction of symptoms.    Perfusion: Comparison of post Lexiscan and prone images with the resting revealed no significant reversible ischemia There is a probable artifact caused by breast attenuation.    Stress Function: Left ventricular function post-stress is normal.  Calculated ejection fraction is 60%.    Perfusion Defect Conclusion: There is no evidence of transient ischemic dilation (TID).        Imaging:  Chest X-Ray:   XR chest pa & lateral    Result Date: 8/14/2024  Impression Borderline cardiomegaly. Slight central vascular congestion. Workstation performed: [id number]       CT-scan of the chest:     No CTA results available for this patient.  Lab Review   Lab Results   Component Value Date    WBC 9.1 08/31/2024    HGB 11.5 (L) 08/31/2024    HCT 36.1 08/31/2024    MCV 87.0 08/31/2024    RDW 14.3 08/31/2024     08/31/2024     BMP:  Lab Results   Component Value Date    SODIUM 137 08/31/2024    K 4.3 08/31/2024     08/31/2024    CO2 27 08/31/2024    ANIONGAP 7 11/08/2014    BUN 12 08/31/2024    CREATININE 0.72 08/31/2024    GLUC 162 (H) 08/31/2024    CALCIUM 9.1 08/31/2024    EGFR 105 08/31/2024    MG 2.0 01/15/2016     LFT:  Lab Results   Component Value Date    AST 15 08/31/2024    ALT 15 08/31/2024    ALKPHOS 97 08/31/2024    TP 6.5 08/31/2024    ALB 3.9 08/31/2024      No components found for: \"TSH3\"  Lab Results   Component Value Date    UIA6DVEMAOMW 3.14 01/03/2018     Lab Results   Component Value Date    HGBA1C 7.6 (H) 08/31/2024     Lipid Profile:   Lab Results " "  Component Value Date    CHOLESTEROL 158 08/31/2024    HDL 27 (L) 08/31/2024    LDLCALC 102 (H) 08/31/2024    TRIG 174 (H) 08/31/2024     Lab Results   Component Value Date    CHOLESTEROL 158 08/31/2024    CHOLESTEROL 167 11/21/2023     Lab Results   Component Value Date    TROPONINI <0.02 04/16/2021     No results found for: \"NTBNP\"   No results found for this or any previous visit (from the past 672 hour(s)).          Dr. Paul St MD, [facility]      \"This note has been constructed using a voice recognition system.Therefore there may be syntax, spelling, and/or grammatical errors. Please call if you have any questions. \"  "

## 2024-10-10 ENCOUNTER — TELEPHONE (OUTPATIENT)
Age: 44
End: 2024-10-10

## 2024-10-10 ENCOUNTER — OFFICE VISIT (OUTPATIENT)
Dept: CARDIOLOGY CLINIC | Facility: CLINIC | Age: 44
End: 2024-10-10
Payer: COMMERCIAL

## 2024-10-10 VITALS
SYSTOLIC BLOOD PRESSURE: 122 MMHG | HEART RATE: 73 BPM | OXYGEN SATURATION: 97 % | WEIGHT: 250 LBS | BODY MASS INDEX: 49.08 KG/M2 | HEIGHT: 60 IN | DIASTOLIC BLOOD PRESSURE: 80 MMHG

## 2024-10-10 DIAGNOSIS — E11.9 TYPE 2 DIABETES MELLITUS WITHOUT COMPLICATION, WITHOUT LONG-TERM CURRENT USE OF INSULIN (HCC): ICD-10-CM

## 2024-10-10 DIAGNOSIS — E78.2 MIXED HYPERLIPIDEMIA: ICD-10-CM

## 2024-10-10 DIAGNOSIS — Z86.79 HISTORY OF ATRIAL FLUTTER: ICD-10-CM

## 2024-10-10 DIAGNOSIS — E78.5 HYPERLIPIDEMIA, UNSPECIFIED HYPERLIPIDEMIA TYPE: ICD-10-CM

## 2024-10-10 DIAGNOSIS — E78.1 HYPERTRIGLYCERIDEMIA: ICD-10-CM

## 2024-10-10 DIAGNOSIS — I10 ESSENTIAL HYPERTENSION: Primary | ICD-10-CM

## 2024-10-10 DIAGNOSIS — R06.02 SOB (SHORTNESS OF BREATH): ICD-10-CM

## 2024-10-10 DIAGNOSIS — R94.31 ABNORMAL EKG: ICD-10-CM

## 2024-10-10 PROCEDURE — 93000 ELECTROCARDIOGRAM COMPLETE: CPT | Performed by: INTERNAL MEDICINE

## 2024-10-10 PROCEDURE — 99214 OFFICE O/P EST MOD 30 MIN: CPT | Performed by: INTERNAL MEDICINE

## 2024-10-10 RX ORDER — CHLORAL HYDRATE 500 MG
1000 CAPSULE ORAL 2 TIMES DAILY
Qty: 180 CAPSULE | Refills: 4 | Status: SHIPPED | OUTPATIENT
Start: 2024-10-10

## 2024-10-10 RX ORDER — ATORVASTATIN CALCIUM 20 MG/1
20 TABLET, FILM COATED ORAL DAILY
Qty: 90 TABLET | Refills: 2 | Status: SHIPPED | OUTPATIENT
Start: 2024-10-10

## 2024-10-10 NOTE — TELEPHONE ENCOUNTER
Received call from pt.  Atorvastatin costs $300, and she is asking for an alternative.  She is not sure which ones would be covered by insurance, but will call once she has options from Dr. St.    Please advise.

## 2024-10-10 NOTE — TELEPHONE ENCOUNTER
I spoke with patient, stated that pharmacy gave her a bill for atorvastatin to be $300,   I called pharmacy, they put it through insurance. It is now covered.    I spoke with patient, made aware.   She verbalized understanding, and is thankful for the call.

## 2024-10-22 ENCOUNTER — PREP FOR PROCEDURE (OUTPATIENT)
Age: 44
End: 2024-10-22

## 2024-10-22 ENCOUNTER — TELEPHONE (OUTPATIENT)
Age: 44
End: 2024-10-22

## 2024-10-22 DIAGNOSIS — Z12.11 SCREENING FOR COLON CANCER: Primary | ICD-10-CM

## 2024-10-22 NOTE — TELEPHONE ENCOUNTER
10/22/24  Screened by: Nirmala Mckenna    Referring Provider SULMA Paredes    Pre- Screening:     There is no height or weight on file to calculate BMI.  48.82  Has patient been referred for a routine screening Colonoscopy? yes  Is the patient between 45-75 years old? yes      Previous Colonoscopy no   If yes:    Date:     Facility:     Reason:           Does the patient want to see a Gastroenterologist prior to their procedure OR are they having any GI symptoms? no    Has the patient been hospitalized or had abdominal surgery in the past 6 months? no    Does the patient use supplemental oxygen? no    Does the patient take Coumadin, Lovenox, Plavix, Elliquis, Xarelto, or other blood thinning medication? no    Has the patient had a stroke, cardiac event, or stent placed in the past year? no        If patient is between 45yrs - 49yrs, please advise patient that we will have to confirm benefits & coverage with their insurance company for a routine screening colonoscopy.

## 2024-10-22 NOTE — TELEPHONE ENCOUNTER
Scheduled date of colonoscopy (as of today):  11/26/24    Physician performing colonoscopy:  Dr. Cyr    Location of colonoscopy:  [place]    Bowel prep reviewed with patient:     [email or web address].*

## 2024-11-26 ENCOUNTER — TELEPHONE (OUTPATIENT)
Dept: FAMILY MEDICINE CLINIC | Facility: CLINIC | Age: 44
End: 2024-11-26

## 2024-11-26 ENCOUNTER — HOSPITAL ENCOUNTER (OUTPATIENT)
Dept: GASTROENTEROLOGY | Facility: HOSPITAL | Age: 44
Setting detail: OUTPATIENT SURGERY
Discharge: HOME | End: 2024-11-26
Attending: INTERNAL MEDICINE
Payer: COMMERCIAL

## 2024-11-26 ENCOUNTER — ANESTHESIA EVENT (OUTPATIENT)
Dept: GASTROENTEROLOGY | Facility: HOSPITAL | Age: 44
End: 2024-11-26
Payer: COMMERCIAL

## 2024-11-26 ENCOUNTER — ANESTHESIA (OUTPATIENT)
Dept: GASTROENTEROLOGY | Facility: HOSPITAL | Age: 44
End: 2024-11-26
Payer: COMMERCIAL

## 2024-11-26 VITALS
WEIGHT: 249.12 LBS | HEIGHT: 65 IN | RESPIRATION RATE: 16 BRPM | SYSTOLIC BLOOD PRESSURE: 127 MMHG | BODY MASS INDEX: 41.51 KG/M2 | HEART RATE: 68 BPM | OXYGEN SATURATION: 99 % | DIASTOLIC BLOOD PRESSURE: 70 MMHG | TEMPERATURE: 97.9 F

## 2024-11-26 DIAGNOSIS — Z12.11 SCREENING FOR COLON CANCER: ICD-10-CM

## 2024-11-26 LAB
EXT PREGNANCY TEST URINE: NEGATIVE
EXT. CONTROL: NORMAL
GLUCOSE SERPL-MCNC: 188 MG/DL (ref 65–140)

## 2024-11-26 PROCEDURE — 88305 TISSUE EXAM BY PATHOLOGIST: CPT | Performed by: PATHOLOGY

## 2024-11-26 PROCEDURE — 81025 URINE PREGNANCY TEST: CPT | Performed by: ANESTHESIOLOGY

## 2024-11-26 PROCEDURE — 45385 COLONOSCOPY W/LESION REMOVAL: CPT | Performed by: INTERNAL MEDICINE

## 2024-11-26 PROCEDURE — 82948 REAGENT STRIP/BLOOD GLUCOSE: CPT

## 2024-11-26 RX ORDER — PROPOFOL 10 MG/ML
INJECTION, EMULSION INTRAVENOUS AS NEEDED
Status: DISCONTINUED | OUTPATIENT
Start: 2024-11-26 | End: 2024-11-26

## 2024-11-26 RX ORDER — LIDOCAINE HYDROCHLORIDE 20 MG/ML
INJECTION, SOLUTION EPIDURAL; INFILTRATION; INTRACAUDAL; PERINEURAL AS NEEDED
Status: DISCONTINUED | OUTPATIENT
Start: 2024-11-26 | End: 2024-11-26

## 2024-11-26 RX ORDER — SODIUM CHLORIDE, SODIUM LACTATE, POTASSIUM CHLORIDE, CALCIUM CHLORIDE 600; 310; 30; 20 MG/100ML; MG/100ML; MG/100ML; MG/100ML
INJECTION, SOLUTION INTRAVENOUS CONTINUOUS PRN
Status: DISCONTINUED | OUTPATIENT
Start: 2024-11-26 | End: 2024-11-26

## 2024-11-26 RX ORDER — SODIUM CHLORIDE, SODIUM LACTATE, POTASSIUM CHLORIDE, CALCIUM CHLORIDE 600; 310; 30; 20 MG/100ML; MG/100ML; MG/100ML; MG/100ML
75 INJECTION, SOLUTION INTRAVENOUS CONTINUOUS
Status: DISCONTINUED | OUTPATIENT
Start: 2024-11-26 | End: 2024-11-30 | Stop reason: HOSPADM

## 2024-11-26 RX ADMIN — PROPOFOL 50 MG: 10 INJECTION, EMULSION INTRAVENOUS at 08:21

## 2024-11-26 RX ADMIN — LIDOCAINE HYDROCHLORIDE 50 MG: 20 INJECTION, SOLUTION EPIDURAL; INFILTRATION; INTRACAUDAL; PERINEURAL at 08:13

## 2024-11-26 RX ADMIN — PROPOFOL 50 MG: 10 INJECTION, EMULSION INTRAVENOUS at 08:25

## 2024-11-26 RX ADMIN — SODIUM CHLORIDE, SODIUM LACTATE, POTASSIUM CHLORIDE, AND CALCIUM CHLORIDE: .6; .31; .03; .02 INJECTION, SOLUTION INTRAVENOUS at 08:14

## 2024-11-26 RX ADMIN — PROPOFOL 150 MG: 10 INJECTION, EMULSION INTRAVENOUS at 08:13

## 2024-11-26 RX ADMIN — SODIUM CHLORIDE, SODIUM LACTATE, POTASSIUM CHLORIDE, AND CALCIUM CHLORIDE 75 ML/HR: .6; .31; .03; .02 INJECTION, SOLUTION INTRAVENOUS at 07:28

## 2024-11-26 RX ADMIN — PROPOFOL 50 MG: 10 INJECTION, EMULSION INTRAVENOUS at 08:28

## 2024-11-26 NOTE — ANESTHESIA PREPROCEDURE EVALUATION
Procedure:  COLONOSCOPY    Relevant Problems   CARDIO   (+) Essential hypertension   (+) Hyperlipidemia   (+) Hypertriglyceridemia   (+) Migraine headache      ENDO   (+) Hypothyroidism due to Hashimoto's thyroiditis   (+) Type 2 diabetes mellitus without complication, without long-term current use of insulin (HCC)      GI/HEPATIC   (+) Hepatosplenomegaly      NEURO/PSYCH   (+) Anxiety   (+) Migraine headache      PULMONARY   (+) Severe obstructive sleep apnea      Other   (+) Hepatosplenomegaly   (+) Morbid obesity (HCC)      Disease of thyroid gland HYPOTHYROID   Polycystic ovary    Seasonal allergies    Gastritis    Morbidly obese (HCC)    Migraines    Antepartum diabetes mellitus    History of asthma    History of ear pain left   Generalized anxiety disorder    History of gestational diabetes    History of diabetes mellitus    History of dysfunctional uterine bleeding    History of edema    History of hyperlipidemia    History of hypothyroidism    History of polycystic ovarian syndrome    History of upper respiratory infection    Nonspecific abnormal finding    Postsurgical dumping syndrome    History of sore throat    Suspected fetal anomaly not found    Abnormal blood chemistry    Elevated blood pressure reading    Cough    Allergic rhinitis    Nasal congestion    Sleep difficulties    Tinnitus    Obesity    Sleep apnea on CPAP   Bilateral chronic serous otitis media        Stress Combined Conclusion: The ECG and SPECT imaging portions of the stress study are concordant with no evidence of stress induced myocardial ischemia.    The stress ECG is negative for ischemia after vasodilation and low level exercise, without reproduction of symptoms.    Perfusion: Comparison of post Lexiscan and prone images with the resting revealed no significant reversible ischemia There is a probable artifact caused by breast attenuation.    Stress Function: Left ventricular function post-stress is normal.  Calculated ejection  fraction is 60%.    Perfusion Defect Conclusion: There is no evidence of transient ischemic dilation (TID).       Physical Exam    Airway    Mallampati score: III  TM Distance: >3 FB  Neck ROM: full     Dental       Cardiovascular  Cardiovascular exam normal    Pulmonary  Pulmonary exam normal     Other Findings  post-pubertal.      Anesthesia Plan  ASA Score- 3     Anesthesia Type- IV sedation with anesthesia with ASA Monitors.         Additional Monitors:     Airway Plan:            Plan Factors-Exercise tolerance (METS): >4 METS.    Chart reviewed. EKG reviewed. Imaging results reviewed. Existing labs reviewed. Patient summary reviewed.                  Induction- intravenous.    Postoperative Plan-         Informed Consent- Anesthetic plan and risks discussed with patient.  I personally reviewed this patient with the CRNA. Discussed and agreed on the Anesthesia Plan with the CRNA..

## 2024-11-26 NOTE — H&P
History and Physical -  [facility]  Kassidy Curran 44 y.o. female MRN: [id number]      HPI: Kassidy Curran is a 44 y.o. year old female who presents for screening colonoscopy      REVIEW OF SYSTEMS: Per the HPI, and otherwise unremarkable.    Historical Information   Past Medical History:   Diagnosis Date    Abnormal blood chemistry     Allergic rhinitis     Antepartum diabetes mellitus     Bilateral chronic serous otitis media 2020    Cough     Disease of thyroid gland     HYPOTHYROID    Elevated blood pressure reading     Gastritis     Generalized anxiety disorder     History of asthma     History of diabetes mellitus     History of dysfunctional uterine bleeding     History of ear pain     left    History of edema     History of gestational diabetes     History of hyperlipidemia     History of hypothyroidism     History of polycystic ovarian syndrome     History of sore throat     History of upper respiratory infection     Migraines     Morbidly obese (HCC)     Nasal congestion     Nonspecific abnormal finding     Obesity     Polycystic ovary     Postsurgical dumping syndrome     Seasonal allergies     Sleep apnea     on CPAP    Sleep difficulties     Suspected fetal anomaly not found     Tinnitus      Past Surgical History:   Procedure Laterality Date    ADENOIDECTOMY      CARDIAC ELECTROPHYSIOLOGY STUDY AND ABLATION       SECTION      CHOLECYSTECTOMY      FOOT SURGERY Right     screw rt toe    GALLBLADDER SURGERY      MOUTH SURGERY      OK ESOPHAGOGASTRODUODENOSCOPY TRANSORAL DIAGNOSTIC N/A 2016    Procedure: ESOPHAGOGASTRODUODENOSCOPY (EGD);  Surgeon: Becki Patrick MD;  Location: [facility];  Service: General    TONSILLECTOMY      TOOTH EXTRACTION      WISDOM TOOTH EXTRACTION       Social History   Social History     Substance and Sexual Activity   Alcohol Use Yes    Comment: social     Social History     Substance and Sexual Activity   Drug Use No     Social History  "    Tobacco Use   Smoking Status Never    Passive exposure: Past   Smokeless Tobacco Never     Family History   Problem Relation Age of Onset    Heart disease Mother     Hypertension Mother     Valvular heart disease Mother     Breast cancer Mother 65    No Known Problems Daughter     Breast cancer Maternal Grandmother 75        triple neg    Diabetes Maternal Grandmother     Hypertension Maternal Grandmother     Diabetes Maternal Grandfather     No Known Problems Paternal Grandmother     No Known Problems Paternal Grandfather     No Known Problems Son     No Known Problems Maternal Aunt     No Known Problems Maternal Aunt     No Known Problems Paternal Aunt     No Known Problems Paternal Aunt     Arthritis Family     Varicose Veins Family         of lower extremities       Meds/Allergies     Not in a hospital admission.    Allergies   Allergen Reactions    Jardiance [Empagliflozin] Rash    Albuterol      SYNCOPE    Cat Hair Extract     Dog Epithelium     Metformin Diarrhea    Other      ADHESIVE/WELTS    Pollen Extract        Objective     Blood pressure 123/67, pulse 69, temperature (!) 97.4 °F (36.3 °C), temperature source Temporal, resp. rate 16, height 5' 5\" (1.651 m), weight 113 kg (249 lb 1.9 oz), last menstrual period 11/26/2024, SpO2 97%.      PHYSICAL EXAM    Gen: NAD  CV: RRR  CHEST: Clear  ABD: soft, NT/ND  EXT: no edema      ASSESSMENT/PLAN:  This is a 44 y.o. year old female here for colonoscopy., and she is stable and optimized for her procedure.          "

## 2024-11-26 NOTE — ANESTHESIA POSTPROCEDURE EVALUATION
Post-Op Assessment Note    CV Status:  Stable  Pain Score: 0         Mental Status:  Sleepy   Hydration Status:  Stable   PONV Controlled:  None   Airway Patency:  Patent     Post Op Vitals Reviewed: Yes    No anethesia notable event occurred.    Staff: CRNA           Last Filed PACU Vitals:  Vitals Value Taken Time   Temp 97.3    Pulse 67    /63    Resp 16    SpO2 98        Modified Tiana:  Activity: 2 (11/26/2024  7:12 AM)  Respiration: 2 (11/26/2024  7:12 AM)  Consciousness: 2 (11/26/2024  7:12 AM)  Oxygen Saturation: 2 (11/26/2024  7:12 AM)

## 2024-11-26 NOTE — TELEPHONE ENCOUNTER
----- Message from SULMA Paredes sent at 11/26/2024 10:02 AM EST -----  Regarding: labs  Pt has upcoming appt for DM fu and lab review on 12/5. Needs to have labs done prior to appt. Orders in chart.   Please call pt to advise.

## 2024-12-03 ENCOUNTER — TELEPHONE (OUTPATIENT)
Dept: FAMILY MEDICINE CLINIC | Facility: CLINIC | Age: 44
End: 2024-12-03

## 2024-12-03 ENCOUNTER — RESULTS FOLLOW-UP (OUTPATIENT)
Dept: GASTROENTEROLOGY | Facility: CLINIC | Age: 44
End: 2024-12-03

## 2024-12-03 NOTE — TELEPHONE ENCOUNTER
----- Message from SULMA Paredes sent at 12/3/2024  8:13 AM EST -----  Regarding: labs  Pt has upcoming appt for DM fu and lab review on 12/5.  Needs to have labs done prior to appt. Orders in chart.   Please call pt to advise.

## 2024-12-03 NOTE — TELEPHONE ENCOUNTER
LMOM for patient to call back to let us know if she is able to get labs done today in preparation for her appt this Thursday.  If not we can reschedule her to a later date so she has time to get labs done.

## 2024-12-04 LAB
ALBUMIN SERPL-MCNC: 4.3 G/DL (ref 3.6–5.1)
ALBUMIN/GLOB SERPL: 1.6 (CALC) (ref 1–2.5)
ALP SERPL-CCNC: 98 U/L (ref 31–125)
ALT SERPL-CCNC: 18 U/L (ref 6–29)
AST SERPL-CCNC: 19 U/L (ref 10–35)
BILIRUB SERPL-MCNC: 0.5 MG/DL (ref 0.2–1.2)
BUN SERPL-MCNC: 12 MG/DL (ref 7–25)
BUN/CREAT SERPL: ABNORMAL (CALC) (ref 6–22)
CALCIUM SERPL-MCNC: 9.4 MG/DL (ref 8.6–10.2)
CHLORIDE SERPL-SCNC: 102 MMOL/L (ref 98–110)
CO2 SERPL-SCNC: 31 MMOL/L (ref 20–32)
CREAT SERPL-MCNC: 0.74 MG/DL (ref 0.5–0.99)
GFR/BSA.PRED SERPLBLD CYS-BASED-ARV: 102 ML/MIN/1.73M2
GLOBULIN SER CALC-MCNC: 2.7 G/DL (CALC) (ref 1.9–3.7)
GLUCOSE SERPL-MCNC: 162 MG/DL (ref 65–99)
HBA1C MFR BLD: 7.6 % OF TOTAL HGB
POTASSIUM SERPL-SCNC: 4.2 MMOL/L (ref 3.5–5.3)
PROT SERPL-MCNC: 7 G/DL (ref 6.1–8.1)
SODIUM SERPL-SCNC: 139 MMOL/L (ref 135–146)
TSH SERPL-ACNC: 4.06 MIU/L

## 2024-12-05 ENCOUNTER — OFFICE VISIT (OUTPATIENT)
Dept: FAMILY MEDICINE CLINIC | Facility: CLINIC | Age: 44
End: 2024-12-05
Payer: COMMERCIAL

## 2024-12-05 VITALS
HEART RATE: 83 BPM | TEMPERATURE: 97.3 F | BODY MASS INDEX: 42.15 KG/M2 | DIASTOLIC BLOOD PRESSURE: 80 MMHG | HEIGHT: 65 IN | WEIGHT: 253 LBS | RESPIRATION RATE: 18 BRPM | SYSTOLIC BLOOD PRESSURE: 126 MMHG

## 2024-12-05 DIAGNOSIS — E78.1 HYPERTRIGLYCERIDEMIA: ICD-10-CM

## 2024-12-05 DIAGNOSIS — I10 ESSENTIAL HYPERTENSION: ICD-10-CM

## 2024-12-05 DIAGNOSIS — E11.9 TYPE 2 DIABETES MELLITUS WITHOUT COMPLICATION, WITHOUT LONG-TERM CURRENT USE OF INSULIN (HCC): Primary | ICD-10-CM

## 2024-12-05 DIAGNOSIS — E78.2 MIXED HYPERLIPIDEMIA: ICD-10-CM

## 2024-12-05 DIAGNOSIS — E06.3 HYPOTHYROIDISM DUE TO HASHIMOTO'S THYROIDITIS: ICD-10-CM

## 2024-12-05 DIAGNOSIS — M25.561 ACUTE PAIN OF RIGHT KNEE: ICD-10-CM

## 2024-12-05 PROCEDURE — 99214 OFFICE O/P EST MOD 30 MIN: CPT | Performed by: NURSE PRACTITIONER

## 2024-12-05 RX ORDER — PREDNISONE 20 MG/1
20 TABLET ORAL DAILY
Qty: 5 TABLET | Refills: 0 | Status: SHIPPED | OUTPATIENT
Start: 2024-12-05 | End: 2024-12-10

## 2024-12-05 NOTE — PROGRESS NOTES
Name: Kassidy Curran      : 1980      MRN: [id number]  Encounter Provider: SULMA Paredes  Encounter Date: 2024   Encounter department: [facility]  :  Assessment & Plan  Type 2 diabetes mellitus without complication, without long-term current use of insulin (HCC)  Carb controlled diet  Continue trulicity.   Refer to endocrine as discussed.   Lab Results   Component Value Date    HGBA1C 7.6 (H) 2024   Orders:    Microalbumin, Random Urine (W/Creatinine) ([facility] ONLY)    Hemoglobin A1C; Future    Comprehensive metabolic panel; Future    CBC and Platelet; Future    Lipid Panel with Direct LDL reflex; Future    Ambulatory Referral to Endocrinology; Future    Essential hypertension  Stable. Continue blood pressure medications as ordered.   Monitor blood pressure. Stress management. Regular exercise  Limit salt in diet.   Orders:    Comprehensive metabolic panel; Future    CBC and Platelet; Future    Hypothyroidism due to Hashimoto's thyroiditis  Levothyroxine.   Refer to endocrine.   Monitor.   Orders:    CBC and Platelet; Future    TSH, 3rd generation with Free T4 reflex; Future    Ambulatory Referral to Endocrinology; Future    Hypertriglyceridemia  Fish oil. Statin. Heart healthy diet.   Orders:    Comprehensive metabolic panel; Future    CBC and Platelet; Future    Lipid Panel with Direct LDL reflex; Future    Mixed hyperlipidemia  Fish oil. Statin. Heart healthy diet.   Orders:    Comprehensive metabolic panel; Future    CBC and Platelet; Future    Lipid Panel with Direct LDL reflex; Future    Acute pain of right knee  Orders:    predniSONE 20 mg tablet; Take 1 tablet (20 mg total) by mouth daily for 5 days    XR knee 3 vw right non injury; Future    Ambulatory Referral to Orthopedic Surgery; Future      Patient was counseled regarding instructions for management which included: impression/diagnosis, risk/benefits of treatment options, importance of compliance with  "treatment, risk factor reduction, and prognosis.   I have reviewed the instructions with the patient answering all questions and patient verbalized understanding.       History of Present Illness     Here for DM and chronic fu, med check, lab review  Checking blood sugars, running high, today fasting was 198  Trulicity weekly. Was on metformin for 8 years but stopped due to diarrhea. Had reaction to jardiance , perineal burning.   Taking all meds as prescribed  Diet- trying to improve diet, getting more fruits and vegetable .  Seen by cardio in October.   Twisted right knee on 11/18. Had incident when son was in hospital for tonsillectomy. Feels like locking up. Turning is painful.   Been taking ibuprofen.         Review of Systems   Constitutional:  Negative for fatigue and unexpected weight change.   Respiratory:  Negative for chest tightness and shortness of breath.    Cardiovascular:  Negative for chest pain and palpitations.   Gastrointestinal:  Negative for abdominal pain.   Musculoskeletal:  Positive for arthralgias (right knee).   Skin:  Negative for rash and wound.   Neurological:  Negative for dizziness and headaches.   Psychiatric/Behavioral:  Negative for dysphoric mood. The patient is not nervous/anxious.           Objective   /80   Pulse 83   Temp (!) 97.3 °F (36.3 °C)   Resp 18   Ht 5' 5\" (1.651 m)   Wt 115 kg (253 lb)   LMP 11/26/2024 (Approximate)   BMI 42.10 kg/m²      Recent Results (from the past 4 weeks)   POCT pregnancy, urine    Collection Time: 11/26/24  7:24 AM   Result Value Ref Range    EXT Preg Test, Ur Negative Negative    Control Valid Valid   Fingerstick Glucose (POCT)    Collection Time: 11/26/24  7:25 AM   Result Value Ref Range    POC Glucose 188 (H) 65 - 140 mg/dl   Tissue Exam    Collection Time: 11/26/24  8:34 AM   Result Value Ref Range    Case Report       Surgical Pathology Report                         Case: [id number]                                "   Authorizing Provider:  Sajan Cyr DO        Collected:           11/26/2024 0834              Ordering Location:     [facility] Received:            11/26/2024 1701                                     Endoscopy                                                                    Pathologist:           Masha Steele MD                                                         Specimens:   A) - Polyp, Colorectal, proximal sigmoid                                                            B) - Polyp, Colorectal, sigmoid 30cm                                                       Final Diagnosis       A. Proximal sigmoid colon polyp (biopsy):  - Tubular adenoma  - No high grade dysplasia     B. Sigmoid colon polyp, 30CM (biopsy):  - Fragments of hyperplastic polyp  - Negative for dysplasia       Additional Information       All reported additional testing was performed with appropriately reactive controls.  These tests were developed and their performance characteristics determined by [facility] or appropriate performing facility, though some tests may be performed on tissues which have not been validated for performance characteristics (such as staining performed on alcohol exposed cell blocks and decalcified tissues).  Results should be interpreted with caution and in the context of the patients’ clinical condition. These tests may not be cleared or approved by the U.S. Food and Drug Administration, though the FDA has determined that such clearance or approval is not necessary. These tests are used for clinical purposes and they should not be regarded as investigational or for research. This laboratory has been approved by CLIA 88, designated as a high-complexity laboratory and is qualified to perform these tests.  .      Synoptic Checklist          COLON/RECTUM POLYP FORM - GI - All Specimens          :    Adenoma(s)       :    Other      Gross Description       A. The  "specimen is received in formalin, labeled with the patient's name and hospital number, and is designated \" proximal sigmoid\".  The specimen consists of 3 tan soft tissue fragments measuring in range of 0.2-0.3 cm in greatest dimension.  Entirely submitted. Screened cassette.  B. The specimen is received in formalin, labeled with the patient's name and hospital number, and is designated \" sigmoid polyp 30 cm\".  The specimen consists of a tan-brown polypoid soft tissue fragment measuring 0.7 x 0.5 x 0.4 cm.  The presumed margin of resection inked blue and the specimen is bisected.  Entirely submitted. Screened cassette.    Note: The estimated total formalin fixation time based upon information provided by the submitting clinician and the standard processing schedule is under 72 hours.    -[facility]      Clinical Information Cold snare polypectomy    Hemoglobin A1c (w/out EAG) ([facility] ONLY)    Collection Time: 12/03/24  8:44 AM   Result Value Ref Range    Hemoglobin A1C 7.6 (H) <5.7 % of total Hgb   TSH, 3rd generation with Free T4 reflex    Collection Time: 12/03/24  8:44 AM   Result Value Ref Range    TSH W/RFX TO FREE T4 4.06 mIU/L   Comprehensive metabolic panel    Collection Time: 12/03/24  8:44 AM   Result Value Ref Range    Glucose, Random 162 (H) 65 - 99 mg/dL    BUN 12 7 - 25 mg/dL    Creatinine 0.74 0.50 - 0.99 mg/dL    eGFR 102 > OR = 60 mL/min/1.73m2    SL AMB BUN/CREATININE RATIO SEE NOTE: 6 - 22 (calc)    Sodium 139 135 - 146 mmol/L    Potassium 4.2 3.5 - 5.3 mmol/L    Chloride 102 98 - 110 mmol/L    CO2 31 20 - 32 mmol/L    Calcium 9.4 8.6 - 10.2 mg/dL    Protein, Total 7.0 6.1 - 8.1 g/dL    Albumin 4.3 3.6 - 5.1 g/dL    Globulin 2.7 1.9 - 3.7 g/dL (calc)    Albumin/Globulin Ratio 1.6 1.0 - 2.5 (calc)    TOTAL BILIRUBIN 0.5 0.2 - 1.2 mg/dL    Alkaline Phosphatase 98 31 - 125 U/L    AST 19 10 - 35 U/L    ALT 18 6 - 29 U/L     Reviewed lab/diagnostic results with pt including both normal and abnormal " findings.   In depth counseling and instructions given. All questions answered during visit.     Physical Exam  Vitals reviewed.   Constitutional:       General: She is not in acute distress.     Appearance: She is obese. She is not ill-appearing.   Neck:      Vascular: No carotid bruit.   Cardiovascular:      Rate and Rhythm: Normal rate and regular rhythm.      Pulses: no weak pulses.           Dorsalis pedis pulses are 2+ on the right side and 2+ on the left side.        Posterior tibial pulses are 2+ on the right side and 2+ on the left side.   Pulmonary:      Effort: Pulmonary effort is normal. No respiratory distress.      Breath sounds: Normal breath sounds. No wheezing or rales.   Musculoskeletal:      Cervical back: Normal range of motion.   Feet:      Right foot:      Skin integrity: No ulcer, skin breakdown, erythema, warmth, callus or dry skin.      Left foot:      Skin integrity: No ulcer, skin breakdown, erythema, warmth, callus or dry skin.   Skin:     General: Skin is warm and dry.      Coloration: Skin is not jaundiced or pale.   Neurological:      General: No focal deficit present.      Mental Status: She is alert and oriented to person, place, and time.      Cranial Nerves: No cranial nerve deficit.      Sensory: No sensory deficit.   Psychiatric:         Mood and Affect: Mood normal.         Behavior: Behavior normal.         Thought Content: Thought content normal.         Judgment: Judgment normal.     Patient's shoes and socks removed.    Right Foot/Ankle   Right Foot Inspection  Skin Exam: skin normal and skin intact. No dry skin, no warmth, no callus, no erythema, no maceration, no abnormal color, no pre-ulcer, no ulcer and no callus.     Toe Exam: ROM and strength within normal limits.     Sensory   Monofilament testing: intact    Vascular  Capillary refills: < 3 seconds  The right DP pulse is 2+. The right PT pulse is 2+.     Left Foot/Ankle  Left Foot Inspection  Skin Exam: skin normal  and skin intact. No dry skin, no warmth, no erythema, no maceration, normal color, no pre-ulcer, no ulcer and no callus.     Toe Exam: ROM and strength within normal limits.     Sensory   Monofilament testing: intact    Vascular  Capillary refills: < 3 seconds  The left DP pulse is 2+. The left PT pulse is 2+.     Assign Risk Category  No deformity present  No loss of protective sensation  No weak pulses  Risk: 0

## 2024-12-05 NOTE — PATIENT INSTRUCTIONS
Evaluation by endocrinology as discussed  Xray right knee  Prednisone 20mg daily with food for 5 days.   Schedule appt with orthopedics  Continue all same medications  Carbohydrate controlled, heart healthy diet.

## 2024-12-05 NOTE — ASSESSMENT & PLAN NOTE
Fish oil. Statin. Heart healthy diet.   Orders:    Comprehensive metabolic panel; Future    CBC and Platelet; Future    Lipid Panel with Direct LDL reflex; Future

## 2024-12-05 NOTE — ASSESSMENT & PLAN NOTE
Stable. Continue blood pressure medications as ordered.   Monitor blood pressure. Stress management. Regular exercise  Limit salt in diet.   Orders:    Comprehensive metabolic panel; Future    CBC and Platelet; Future

## 2024-12-05 NOTE — ASSESSMENT & PLAN NOTE
Levothyroxine.   Refer to endocrine.   Monitor.   Orders:    CBC and Platelet; Future    TSH, 3rd generation with Free T4 reflex; Future    Ambulatory Referral to Endocrinology; Future

## 2024-12-05 NOTE — ASSESSMENT & PLAN NOTE
Carb controlled diet  Continue trulicity.   Refer to endocrine as discussed.   Lab Results   Component Value Date    HGBA1C 7.6 (H) 12/03/2024   Orders:    Microalbumin, Random Urine (W/Creatinine) ([facility] ONLY)    Hemoglobin A1C; Future    Comprehensive metabolic panel; Future    CBC and Platelet; Future    Lipid Panel with Direct LDL reflex; Future    Ambulatory Referral to Endocrinology; Future

## 2024-12-06 LAB
ALBUMIN/CREAT UR: 6 MG/G CREAT
CREAT UR-MCNC: 209 MG/DL (ref 20–275)
MICROALBUMIN UR-MCNC: 1.3 MG/DL

## 2024-12-09 ENCOUNTER — APPOINTMENT (OUTPATIENT)
Dept: RADIOLOGY | Facility: CLINIC | Age: 44
End: 2024-12-09
Payer: COMMERCIAL

## 2024-12-09 ENCOUNTER — RESULTS FOLLOW-UP (OUTPATIENT)
Dept: FAMILY MEDICINE CLINIC | Facility: CLINIC | Age: 44
End: 2024-12-09

## 2024-12-09 ENCOUNTER — OFFICE VISIT (OUTPATIENT)
Dept: OBGYN CLINIC | Facility: CLINIC | Age: 44
End: 2024-12-09
Payer: COMMERCIAL

## 2024-12-09 VITALS
WEIGHT: 253.4 LBS | HEART RATE: 79 BPM | SYSTOLIC BLOOD PRESSURE: 158 MMHG | DIASTOLIC BLOOD PRESSURE: 85 MMHG | HEIGHT: 60 IN | BODY MASS INDEX: 49.75 KG/M2

## 2024-12-09 DIAGNOSIS — M25.561 ACUTE PAIN OF RIGHT KNEE: ICD-10-CM

## 2024-12-09 DIAGNOSIS — M23.91 INTERNAL DERANGEMENT OF RIGHT KNEE: Primary | ICD-10-CM

## 2024-12-09 PROCEDURE — 73562 X-RAY EXAM OF KNEE 3: CPT

## 2024-12-09 PROCEDURE — 99204 OFFICE O/P NEW MOD 45 MIN: CPT | Performed by: ORTHOPAEDIC SURGERY

## 2024-12-09 RX ORDER — OMEGA-3-ACID ETHYL ESTERS 1 G/1
1 CAPSULE, LIQUID FILLED ORAL DAILY
COMMUNITY
Start: 2024-10-10

## 2024-12-09 NOTE — PROGRESS NOTES
Ortho Sports Medicine New Patient Visit     Assesment:   44 y.o. female with internal derangement of right knee    Plan:    Kassidy is a pleasant 44 y.o. female who presents for initial evaluation of her right knee. I suspect she may have sustained a meniscal injury of the right knee 3 weeks ago. Given her mechanical symptoms, tender medial joint line, and feelings of instability I have ordered an MRI of the right knee. We discussed potential non operative treatment and operative treatment pending the results of her MRI. Non operative treatment includes physical therapy, oral analgesics, and injection therapy. Operative treatment includes possible right knee arthroscopy with meniscus repair versus menisectomy. She expressed her understanding of the plan. She will follow-up after obtaining an MRI of the right knee.    Follow up:    Return for MRI review of right knee.        Chief Complaint   Patient presents with    Right Knee - Pain     About three weeks ago, November 18 th. knee started hurting. It happened when holding her son. Sometimes, knee clicks.       History of Present Illness:    The patient is a 44 y.o. female who presents for initial evaluation of her right knee. She reports a twisting injury that occurred about 3 weeks ago. She was restraining her child after they woke up from anesthesia in a combative state. She indicates her pain is located anteromedially. She describes the pain as sharp. She saw her PCP initially, who prescribed her oral prednisone, which did not relieve her symptoms. She has been taking Ibuprofen and Tylenol with minimal relief. She states applying ice worsened her pain; however a warm compress helped some. She denies any distal paresthesias of the RLE.      Knee Surgical History:  None    Past Medical, Social and Family History:  Past Medical History:   Diagnosis Date    Abnormal blood chemistry     Allergic rhinitis     Antepartum diabetes mellitus     Bilateral chronic serous  otitis media 2020    Cough     Disease of thyroid gland     HYPOTHYROID    Elevated blood pressure reading     Gastritis     Generalized anxiety disorder     History of asthma     History of diabetes mellitus     History of dysfunctional uterine bleeding     History of ear pain     left    History of edema     History of gestational diabetes     History of hyperlipidemia     History of hypothyroidism     History of polycystic ovarian syndrome     History of sore throat     History of upper respiratory infection     Migraines     Morbidly obese (HCC)     Nasal congestion     Nonspecific abnormal finding     Obesity     Polycystic ovary     Postsurgical dumping syndrome     Seasonal allergies     Sleep apnea     on CPAP    Sleep difficulties     Suspected fetal anomaly not found     Tinnitus      Past Surgical History:   Procedure Laterality Date    ADENOIDECTOMY      CARDIAC ELECTROPHYSIOLOGY STUDY AND ABLATION       SECTION      CHOLECYSTECTOMY      FOOT SURGERY Right     screw rt toe    GALLBLADDER SURGERY      MOUTH SURGERY      UT ESOPHAGOGASTRODUODENOSCOPY TRANSORAL DIAGNOSTIC N/A 2016    Procedure: ESOPHAGOGASTRODUODENOSCOPY (EGD);  Surgeon: Becki Patrick MD;  Location: [facility];  Service: General    TONSILLECTOMY      TOOTH EXTRACTION      WISDOM TOOTH EXTRACTION       Allergies   Allergen Reactions    Jardiance [Empagliflozin] Rash    Albuterol      SYNCOPE    Cat Hair Extract     Dog Epithelium     Metformin Diarrhea    Other      ADHESIVE/WELTS    Pollen Extract      Current Outpatient Medications on File Prior to Visit   Medication Sig Dispense Refill    atorvastatin (LIPITOR) 20 mg tablet Take 1 tablet (20 mg total) by mouth daily 90 tablet 2    Blood Glucose Monitoring Suppl (ONE TOUCH ULTRA MINI) w/Device KIT USE AS DIRECTED DX: E11.8  0    dulaglutide (Trulicity) 3 MG/0.5ML injection Inject 0.5 mL (3 mg total) under the skin every 7 days 6 mL 1    fluticasone (FLONASE) 50 mcg/act  nasal spray SPRAY 1 SPRAY INTO EACH NOSTRIL EVERY DAY 48 mL 1    glucose blood (Accu-Chek Samaria Plus) test strip Test 4 times per day 100 each 1    Lancets (ONETOUCH ULTRASOFT) lancets Use as instructed 100 each 0    levothyroxine 150 mcg tablet TAKE 1 TABLET (150 MCG TOTAL) BY MOUTH DAILY IN THE EARLY MORNING 90 tablet 2    meloxicam (MOBIC) 15 mg tablet TAKE 1 TABLET (15 MG TOTAL) BY MOUTH DAILY. 90 tablet 1    Multiple Vitamins-Minerals (MULTIVITAMIN ADULT) CHEW Chew      Omega-3 Fatty Acids (fish oil) 1,000 mg Take 1 capsule (1,000 mg total) by mouth 2 (two) times a day 180 capsule 4    predniSONE 20 mg tablet Take 1 tablet (20 mg total) by mouth daily for 5 days 5 tablet 0    telmisartan (MICARDIS) 40 mg tablet TAKE 1 TABLET BY MOUTH EVERY  tablet 1    venlafaxine (EFFEXOR-XR) 37.5 mg 24 hr capsule TAKE 1 CAPSULE BY MOUTH EVERY DAY 90 capsule 1    Vitamin D, Cholecalciferol, 1000 UNITS CAPS Take by mouth.      omega-3-acid ethyl esters (LOVAZA) 1 g capsule Take 1 g by mouth daily (Patient not taking: Reported on 12/9/2024)       No current facility-administered medications on file prior to visit.     Social History     Socioeconomic History    Marital status: /Civil Union     Spouse name: Not on file    Number of children: Not on file    Years of education: Not on file    Highest education level: Not on file   Occupational History    Not on file   Tobacco Use    Smoking status: Never     Passive exposure: Past    Smokeless tobacco: Never   Vaping Use    Vaping status: Never Used   Substance and Sexual Activity    Alcohol use: Yes     Comment: social    Drug use: No    Sexual activity: Not on file   Other Topics Concern    Not on file   Social History Narrative    Daily coffee consumption, 1 cup per day    Exercising regularly    Sexually active     Social Drivers of Health     Financial Resource Strain: Not on file   Food Insecurity: Not on file   Transportation Needs: Not on file   Physical  Activity: Not on file   Stress: Not on file   Social Connections: Not on file   Intimate Partner Violence: Not on file   Housing Stability: Not on file         I have reviewed the past medical, surgical, social and family history, medications and allergies as documented in the EMR.    Review of systems: ROS is negative other than that noted in the HPI.  Constitutional: Negative for fatigue and fever.   HENT: Negative for sore throat.    Respiratory: Negative for shortness of breath.    Cardiovascular: Negative for chest pain.   Gastrointestinal: Negative for abdominal pain.   Endocrine: Negative for cold intolerance and heat intolerance.   Genitourinary: Negative for flank pain.   Musculoskeletal: Negative for back pain.   Skin: Negative for rash.   Allergic/Immunologic: Negative for immunocompromised state.   Neurological: Negative for dizziness.   Psychiatric/Behavioral: Negative for agitation.      Physical Exam:    Blood pressure 158/85, pulse 79, height 5' (1.524 m), weight 115 kg (253 lb 6.4 oz), last menstrual period 11/26/2024.    General/Constitutional: NAD, well developed, well nourished  HENT: Normocephalic, atraumatic  CV: Intact distal pulses, regular rate  Resp: No respiratory distress or labored breathing  Abdomen: soft, nondistended   Lymphatic: No lymphadenopathy palpated  Neuro: Alert and Oriented x 3, no focal deficits  Psych: Normal mood, normal affect  Skin: Warm, dry, no rashes, no erythema      Knee Exam:   No significant skin lesions or deformity  Range of motion from 0° compared to slight hyper extension on the left to 120°  Medial joint line tenderness   Medial retinaculum tenderness  Femoral MCL tenderness  Knee is stable to varus stress, valgus stress, Lachman, and posterior drawer.    Neurovascularly intact distally    Knee Imaging    X-rays of the right knee reviewed and interpreted today. These show minimal degenerative changes. No acute fractures or dislocations.    Scribe Attestation       I,:  Delmis Norman am acting as a scribe while in the presence of the attending physician.:       I,:  Scottie Lemus MD personally performed the services described in this documentation    as scribed in my presence.:

## 2024-12-19 ENCOUNTER — HOSPITAL ENCOUNTER (OUTPATIENT)
Dept: RADIOLOGY | Facility: HOSPITAL | Age: 44
Discharge: HOME | End: 2024-12-19
Attending: ORTHOPAEDIC SURGERY
Payer: COMMERCIAL

## 2024-12-19 DIAGNOSIS — M25.561 ACUTE PAIN OF RIGHT KNEE: ICD-10-CM

## 2024-12-19 DIAGNOSIS — M23.91 INTERNAL DERANGEMENT OF RIGHT KNEE: ICD-10-CM

## 2024-12-19 PROCEDURE — 73721 MRI JNT OF LWR EXTRE W/O DYE: CPT

## 2024-12-26 ENCOUNTER — OFFICE VISIT (OUTPATIENT)
Dept: OBGYN CLINIC | Facility: CLINIC | Age: 44
End: 2024-12-26
Payer: COMMERCIAL

## 2024-12-26 VITALS — HEIGHT: 60 IN | WEIGHT: 253 LBS | BODY MASS INDEX: 49.67 KG/M2

## 2024-12-26 DIAGNOSIS — M17.11 PRIMARY OSTEOARTHRITIS OF RIGHT KNEE: Primary | ICD-10-CM

## 2024-12-26 PROCEDURE — 99213 OFFICE O/P EST LOW 20 MIN: CPT | Performed by: ORTHOPAEDIC SURGERY

## 2024-12-26 PROCEDURE — 20610 DRAIN/INJ JOINT/BURSA W/O US: CPT | Performed by: ORTHOPAEDIC SURGERY

## 2024-12-26 RX ORDER — TRIAMCINOLONE ACETONIDE 40 MG/ML
40 INJECTION, SUSPENSION INTRA-ARTICULAR; INTRAMUSCULAR
Status: COMPLETED | OUTPATIENT
Start: 2024-12-26 | End: 2024-12-26

## 2024-12-26 RX ORDER — DULAGLUTIDE 3 MG/.5ML
3 INJECTION, SOLUTION SUBCUTANEOUS
COMMUNITY
Start: 2024-12-02

## 2024-12-26 RX ORDER — BUPIVACAINE HYDROCHLORIDE 5 MG/ML
4 INJECTION, SOLUTION EPIDURAL; INTRACAUDAL
Status: COMPLETED | OUTPATIENT
Start: 2024-12-26 | End: 2024-12-26

## 2024-12-26 RX ADMIN — TRIAMCINOLONE ACETONIDE 40 MG: 40 INJECTION, SUSPENSION INTRA-ARTICULAR; INTRAMUSCULAR at 08:15

## 2024-12-26 RX ADMIN — BUPIVACAINE HYDROCHLORIDE 4 ML: 5 INJECTION, SOLUTION EPIDURAL; INTRACAUDAL at 08:15

## 2024-12-26 NOTE — PROGRESS NOTES
"Ortho Sports Medicine Follow Up Patient Visit     Assesment:   44 y.o. female with right knee osteoarthritis     Plan:    We reviewed her MRI today which showed evidence of osteoarthritis without any focal meniscal tearing.  I was happy to reassure her that there is no indication for surgery at this time.  We discussed treatments for her underlying arthritis that is visualized on the MRI.  We discussed injections options which she was interested in proceeding with today.  She tolerated a corticosteroid injection without any complication.  We did discuss the MRI did show a popliteal cyst as well.  We discussed this is usually just a result of the swelling that comes from the arthritis within the knee.  It is not very symptomatic and I did not recommend any specific treatment for this today.  We discussed additional treatment options for her arthritic pain including NSAIDs, weight management, physical therapy.  She has got think about physical therapy.  She is going to continue to work on weight management as much as possible.  She is going to follow-up with me again in the future if the injection wears off and she would like to discuss additional options.    Large joint arthrocentesis: R knee  [facility] Protocol:  procedure performed by consultantConsent: Verbal consent obtained.  Risks and benefits: risks, benefits and alternatives were discussed  Consent given by: patient  Time out: Immediately prior to procedure a \"time out\" was called to verify the correct patient, procedure, equipment, support staff and site/side marked as required.  Patient identity confirmed: verbally with patient  Supporting Documentation  Indications: pain   Procedure Details  Location: knee - R knee  Needle size: 22 G  Ultrasound guidance: no  Approach: superior  Medications administered: 4 mL bupivacaine (PF) 0.5 %; 40 mg triamcinolone acetonide 40 mg/mL    Patient tolerance: patient tolerated the procedure well with no immediate " complications  Dressing:  Sterile dressing applied            Chief Complaint   Patient presents with    Right Knee - Follow-up     MRI in chart.       History of Present Illness:    The patient is a 44 y.o. female who returns for MRI review.  Pain is only slightly better than during previous visit.  Continues most on the medial side the knee.  She does feel swelling in the popliteal fossa but this is not terribly painful.  Denies any new numbness or tingling.      Knee Surgical History:  None    Past Medical, Social and Family History:  Past Medical History:   Diagnosis Date    Abnormal blood chemistry     Allergic rhinitis     Antepartum diabetes mellitus     Bilateral chronic serous otitis media 2020    Cough     Disease of thyroid gland     HYPOTHYROID    Elevated blood pressure reading     Gastritis     Generalized anxiety disorder     History of asthma     History of diabetes mellitus     History of dysfunctional uterine bleeding     History of ear pain     left    History of edema     History of gestational diabetes     History of hyperlipidemia     History of hypothyroidism     History of polycystic ovarian syndrome     History of sore throat     History of upper respiratory infection     Migraines     Morbidly obese (HCC)     Nasal congestion     Nonspecific abnormal finding     Obesity     Polycystic ovary     Postsurgical dumping syndrome     Seasonal allergies     Sleep apnea     on CPAP    Sleep difficulties     Suspected fetal anomaly not found     Tinnitus      Past Surgical History:   Procedure Laterality Date    ADENOIDECTOMY      CARDIAC ELECTROPHYSIOLOGY STUDY AND ABLATION       SECTION      CHOLECYSTECTOMY      FOOT SURGERY Right     screw rt toe    GALLBLADDER SURGERY      MOUTH SURGERY      HI ESOPHAGOGASTRODUODENOSCOPY TRANSORAL DIAGNOSTIC N/A 2016    Procedure: ESOPHAGOGASTRODUODENOSCOPY (EGD);  Surgeon: Becki Patrick MD;  Location: [facility];  Service: General     TONSILLECTOMY      TOOTH EXTRACTION      WISDOM TOOTH EXTRACTION       Allergies   Allergen Reactions    Jardiance [Empagliflozin] Rash    Albuterol      SYNCOPE    Cat Hair Extract     Dog Epithelium     Metformin Diarrhea    Other      ADHESIVE/WELTS    Pollen Extract      Current Outpatient Medications on File Prior to Visit   Medication Sig Dispense Refill    atorvastatin (LIPITOR) 20 mg tablet Take 1 tablet (20 mg total) by mouth daily 90 tablet 2    Blood Glucose Monitoring Suppl (ONE TOUCH ULTRA MINI) w/Device KIT USE AS DIRECTED DX: E11.8  0    dulaglutide (Trulicity) 3 MG/0.5ML injection Inject 0.5 mL (3 mg total) under the skin every 7 days 6 mL 1    fluticasone (FLONASE) 50 mcg/act nasal spray SPRAY 1 SPRAY INTO EACH NOSTRIL EVERY DAY 48 mL 1    glucose blood (Accu-Chek Samaria Plus) test strip Test 4 times per day 100 each 1    Lancets (ONETOUCH ULTRASOFT) lancets Use as instructed 100 each 0    levothyroxine 150 mcg tablet TAKE 1 TABLET (150 MCG TOTAL) BY MOUTH DAILY IN THE EARLY MORNING 90 tablet 2    meloxicam (MOBIC) 15 mg tablet TAKE 1 TABLET (15 MG TOTAL) BY MOUTH DAILY. 90 tablet 1    Multiple Vitamins-Minerals (MULTIVITAMIN ADULT) CHEW Chew      Omega-3 Fatty Acids (fish oil) 1,000 mg Take 1 capsule (1,000 mg total) by mouth 2 (two) times a day 180 capsule 4    telmisartan (MICARDIS) 40 mg tablet TAKE 1 TABLET BY MOUTH EVERY  tablet 1    Trulicity 3 MG/0.5ML SOAJ 3 mL      venlafaxine (EFFEXOR-XR) 37.5 mg 24 hr capsule TAKE 1 CAPSULE BY MOUTH EVERY DAY 90 capsule 1    Vitamin D, Cholecalciferol, 1000 UNITS CAPS Take by mouth.      omega-3-acid ethyl esters (LOVAZA) 1 g capsule Take 1 g by mouth daily (Patient not taking: Reported on 12/26/2024)       No current facility-administered medications on file prior to visit.     Social History     Socioeconomic History    Marital status: /Civil Union     Spouse name: Not on file    Number of children: Not on file    Years of education: Not  on file    Highest education level: Not on file   Occupational History    Not on file   Tobacco Use    Smoking status: Never     Passive exposure: Past    Smokeless tobacco: Never   Vaping Use    Vaping status: Never Used   Substance and Sexual Activity    Alcohol use: Yes     Comment: social    Drug use: No    Sexual activity: Not on file   Other Topics Concern    Not on file   Social History Narrative    Daily coffee consumption, 1 cup per day    Exercising regularly    Sexually active     Social Drivers of Health     Financial Resource Strain: Not on file   Food Insecurity: Not on file   Transportation Needs: Not on file   Physical Activity: Not on file   Stress: Not on file   Social Connections: Not on file   Intimate Partner Violence: Not on file   Housing Stability: Not on file         I have reviewed the past medical, surgical, social and family history, medications and allergies as documented in the EMR.    Review of systems: ROS is negative other than that noted in the HPI.  Constitutional: Negative for fatigue and fever.   HENT: Negative for sore throat.    Respiratory: Negative for shortness of breath.    Cardiovascular: Negative for chest pain.   Gastrointestinal: Negative for abdominal pain.   Endocrine: Negative for cold intolerance and heat intolerance.   Genitourinary: Negative for flank pain.   Musculoskeletal: Negative for back pain.   Skin: Negative for rash.   Allergic/Immunologic: Negative for immunocompromised state.   Neurological: Negative for dizziness.   Psychiatric/Behavioral: Negative for agitation.      Physical Exam:    Height 5' (1.524 m), weight 115 kg (253 lb), last menstrual period 11/26/2024.    General/Constitutional: NAD, well developed, well nourished  HENT: Normocephalic, atraumatic  CV: Intact distal pulses, regular rate  Resp: No respiratory distress or labored breathing  Abdomen: soft, nondistended   Lymphatic: No lymphadenopathy palpated  Neuro: Alert and Oriented x 3, no  focal deficits  Psych: Normal mood, normal affect  Skin: Warm, dry, no rashes, no erythema      Knee Exam:   No significant skin lesions or deformity  Range of motion from 0° compared to slight hyper extension on the left to 120°  Medial joint line tenderness   Medial retinaculum tenderness  Femoral MCL tenderness  Knee is stable to varus stress, valgus stress, Lachman, and posterior drawer.    Neurovascularly intact distally    Knee Imaging    X-rays of the right knee reviewed and interpreted today. These show minimal degenerative changes. No acute fractures or dislocations.    MRI reviewed interpreted showing osteoarthritis in the medial patellofemoral compartment.  No focal meniscal tearing.

## 2025-01-13 ENCOUNTER — OFFICE VISIT (OUTPATIENT)
Dept: FAMILY MEDICINE CLINIC | Facility: CLINIC | Age: 45
End: 2025-01-13
Payer: COMMERCIAL

## 2025-01-13 VITALS
DIASTOLIC BLOOD PRESSURE: 80 MMHG | WEIGHT: 251.8 LBS | TEMPERATURE: 97.3 F | BODY MASS INDEX: 49.43 KG/M2 | SYSTOLIC BLOOD PRESSURE: 124 MMHG | HEIGHT: 60 IN | HEART RATE: 74 BPM | RESPIRATION RATE: 18 BRPM

## 2025-01-13 DIAGNOSIS — J98.01 BRONCHOSPASM: ICD-10-CM

## 2025-01-13 DIAGNOSIS — E11.9 TYPE 2 DIABETES MELLITUS WITHOUT COMPLICATION, WITHOUT LONG-TERM CURRENT USE OF INSULIN (HCC): ICD-10-CM

## 2025-01-13 DIAGNOSIS — R05.8 POST-VIRAL COUGH SYNDROME: Primary | ICD-10-CM

## 2025-01-13 PROCEDURE — 99214 OFFICE O/P EST MOD 30 MIN: CPT | Performed by: NURSE PRACTITIONER

## 2025-01-13 RX ORDER — BENZONATATE 200 MG/1
200 CAPSULE ORAL 3 TIMES DAILY PRN
Qty: 20 CAPSULE | Refills: 0 | Status: SHIPPED | OUTPATIENT
Start: 2025-01-13

## 2025-01-13 RX ORDER — AZITHROMYCIN 250 MG/1
TABLET, FILM COATED ORAL
Qty: 6 TABLET | Refills: 0 | Status: CANCELLED | OUTPATIENT
Start: 2025-01-13 | End: 2025-01-18

## 2025-01-13 RX ORDER — ALBUTEROL SULFATE 90 UG/1
2 INHALANT RESPIRATORY (INHALATION) EVERY 6 HOURS PRN
Qty: 18 G | Refills: 0 | Status: SHIPPED | OUTPATIENT
Start: 2025-01-13

## 2025-01-13 NOTE — PATIENT INSTRUCTIONS
Rescue inhaler 2 puffs as needed for shortness breath, chest tightness, bronchospasm, coughing fits.   Tessalon perles as needed for cough.   Call or return to office if symptoms worsen or if new symptoms develop.

## 2025-01-13 NOTE — ASSESSMENT & PLAN NOTE
Stable. Continue current meds. Monitor blood sugar. Carb controlled diet.   Lab Results   Component Value Date    HGBA1C 7.6 (H) 12/03/2024

## 2025-01-13 NOTE — PROGRESS NOTES
Name: Kassidy Curran      : 1980      MRN: [id number]  Encounter Provider: SULMA Paredes  Encounter Date: 2025   Encounter department: [facility]  :  Assessment & Plan  Post-viral cough syndrome  No other current acute symptoms or abnormalities on exam  Will try albuteral inhaler as needed for bronchospasm  Tessalon perles as needed.   Call or return to office if symptoms worsen or if new symptoms develop.   Orders:    albuterol (Ventolin HFA) 90 mcg/act inhaler; Inhale 2 puffs every 6 (six) hours as needed for wheezing    benzonatate (TESSALON) 200 MG capsule; Take 1 capsule (200 mg total) by mouth 3 (three) times a day as needed for cough    Type 2 diabetes mellitus without complication, without long-term current use of insulin (HCC)  Stable. Continue current meds. Monitor blood sugar. Carb controlled diet.   Lab Results   Component Value Date    HGBA1C 7.6 (H) 2024            Bronchospasm  Rescue inhaler 2 puffs as needed for shortness breath, chest tightness, bronchospasm, coughing fits.   Orders:    albuterol (Ventolin HFA) 90 mcg/act inhaler; Inhale 2 puffs every 6 (six) hours as needed for wheezing    Patient was counseled regarding instructions for management which included: impression/diagnosis, risk/benefits of treatment options, importance of compliance with treatment, risk factor reduction, and prognosis.   I have reviewed the instructions with the patient answering all questions and patient verbalized understanding.         History of Present Illness     Here for sick visit  Not feeling well since .   Did covid test on  and was positive  Cough seems to be worse when goes out into cold  Initially had nasal congestion, headache, fever.   Most symptoms resolved but cough is persistent. Took otc cough meds.   Actually feels much better was just concerned about lungs.   Had albuterol listed as allergy. Pt states this was documented by prior  "PCP. Had once as  a child and passed out once and they noted allergy but she cannot recall what else was going on at the time. Willing to try albuterol to see helps  Pt is diabetic. Does report blood sugars were running \" little high\" when sick    Cough  Pertinent negatives include no fever, headaches, postnasal drip, sore throat, shortness of breath or wheezing.     Review of Systems   Constitutional:  Negative for fever.   HENT:  Negative for congestion, postnasal drip, sinus pressure, sinus pain and sore throat.    Respiratory:  Positive for cough. Negative for chest tightness, shortness of breath and wheezing.    Gastrointestinal:  Negative for abdominal pain, diarrhea, nausea and vomiting.   Skin:  Negative for color change and pallor.   Neurological:  Negative for dizziness and headaches.       Objective   /80   Pulse 74   Temp (!) 97.3 °F (36.3 °C)   Resp 18   Ht 5' (1.524 m)   Wt 114 kg (251 lb 12.8 oz)   LMP  (LMP Unknown)   BMI 49.18 kg/m²      Physical Exam  Vitals reviewed.   Constitutional:       General: She is not in acute distress.     Appearance: She is not ill-appearing.   HENT:      Nose: No congestion.      Mouth/Throat:      Mouth: Mucous membranes are moist.      Pharynx: Oropharynx is clear.   Neck:      Vascular: No carotid bruit.   Cardiovascular:      Rate and Rhythm: Normal rate and regular rhythm.   Pulmonary:      Effort: Pulmonary effort is normal. No respiratory distress.      Breath sounds: Normal breath sounds. No wheezing, rhonchi or rales.   Musculoskeletal:      Cervical back: Normal range of motion.   Lymphadenopathy:      Cervical: No cervical adenopathy.   Skin:     General: Skin is warm and dry.      Coloration: Skin is not jaundiced or pale.   Neurological:      General: No focal deficit present.      Mental Status: She is alert and oriented to person, place, and time.      Cranial Nerves: No cranial nerve deficit.      Sensory: No sensory deficit.   Psychiatric: "         Mood and Affect: Mood normal.         Behavior: Behavior normal.         Thought Content: Thought content normal.         Judgment: Judgment normal.

## 2025-02-05 DIAGNOSIS — R05.8 POST-VIRAL COUGH SYNDROME: ICD-10-CM

## 2025-02-05 DIAGNOSIS — J98.01 BRONCHOSPASM: ICD-10-CM

## 2025-02-05 RX ORDER — ALBUTEROL SULFATE 90 UG/1
INHALANT RESPIRATORY (INHALATION)
Qty: 6.7 G | Refills: 5 | Status: SHIPPED | OUTPATIENT
Start: 2025-02-05

## 2025-03-01 DIAGNOSIS — F41.9 ANXIETY: ICD-10-CM

## 2025-03-01 RX ORDER — VENLAFAXINE HYDROCHLORIDE 37.5 MG/1
37.5 CAPSULE, EXTENDED RELEASE ORAL DAILY
Qty: 90 CAPSULE | Refills: 1 | Status: SHIPPED | OUTPATIENT
Start: 2025-03-01 | End: 2025-03-03 | Stop reason: DRUGHIGH

## 2025-03-03 ENCOUNTER — OFFICE VISIT (OUTPATIENT)
Dept: FAMILY MEDICINE CLINIC | Facility: CLINIC | Age: 45
End: 2025-03-03
Payer: COMMERCIAL

## 2025-03-03 VITALS
DIASTOLIC BLOOD PRESSURE: 80 MMHG | BODY MASS INDEX: 49.29 KG/M2 | HEART RATE: 82 BPM | OXYGEN SATURATION: 98 % | TEMPERATURE: 97.5 F | WEIGHT: 252.4 LBS | RESPIRATION RATE: 18 BRPM | SYSTOLIC BLOOD PRESSURE: 126 MMHG

## 2025-03-03 DIAGNOSIS — F32.5 MAJOR DEPRESSIVE DISORDER WITH SINGLE EPISODE, IN FULL REMISSION (HCC): ICD-10-CM

## 2025-03-03 DIAGNOSIS — F41.9 ANXIETY: Primary | ICD-10-CM

## 2025-03-03 PROCEDURE — 99213 OFFICE O/P EST LOW 20 MIN: CPT | Performed by: NURSE PRACTITIONER

## 2025-03-03 RX ORDER — VENLAFAXINE HYDROCHLORIDE 75 MG/1
75 CAPSULE, EXTENDED RELEASE ORAL DAILY
Qty: 90 CAPSULE | Refills: 1 | Status: SHIPPED | OUTPATIENT
Start: 2025-03-03

## 2025-03-03 NOTE — ASSESSMENT & PLAN NOTE
Increase effexor to 75mg daily  Stress management. Activities to divert attention when possible.   Conscious breathing techniques as discussed.   Coping mechanisms and strategies vary from person to person so try to utilize strategies that you think may work for you (such as meditation, music, etc. ).  Consider counseling as discussed.   Anti anxiety/depression medications as prescribed.   Orders:    venlafaxine (EFFEXOR-XR) 75 mg 24 hr capsule; Take 1 capsule (75 mg total) by mouth daily

## 2025-03-03 NOTE — PROGRESS NOTES
Name: Kassidy Curran      : 1980      MRN: [id number]  Encounter Provider: SULMA Paredes  Encounter Date: 3/3/2025   Encounter department: [facility]  :  Assessment & Plan  Anxiety  Increase effexor to 75mg daily  Stress management. Activities to divert attention when possible.   Conscious breathing techniques as discussed.   Coping mechanisms and strategies vary from person to person so try to utilize strategies that you think may work for you (such as meditation, music, etc. ).  Consider counseling as discussed.   Anti anxiety/depression medications as prescribed.   Orders:    venlafaxine (EFFEXOR-XR) 75 mg 24 hr capsule; Take 1 capsule (75 mg total) by mouth daily    Major depressive disorder with single episode, in full remission (HCC)  Depression Screening Follow-up Plan: Patient's depression screening was positive with a PHQ-2 score of 6. Their PHQ-9 score was 10. Patient assessed for underlying major depression. They have no active suicidal ideations. Brief counseling provided and recommend additional follow-up/re-evaluation next office visit.  Will increase effexor to 75mg daily.   Consider counseling.   Anticipatory guidance.   Re-eval in 4 weeks.   Orders:    venlafaxine (EFFEXOR-XR) 75 mg 24 hr capsule; Take 1 capsule (75 mg total) by mouth daily           History of Present Illness   Here for follow up on anxiety/depression   Currently on effexor 37.5mg daily. Feels like need higher dose.   No motivation . No interest in anything, tired all the time. No panic attacks. No suicidal ideation.   No changes to home or work environment. No known triggers to mood change.         Review of Systems   Constitutional:  Positive for fatigue.   Respiratory:  Negative for chest tightness.    Cardiovascular:  Negative for chest pain and palpitations.   Neurological:  Negative for dizziness.   Psychiatric/Behavioral:  Positive for dysphoric mood. Negative for self-injury and  suicidal ideas. The patient is nervous/anxious.        Objective   /80   Pulse 82   Temp 97.5 °F (36.4 °C)   Resp 18   Wt 114 kg (252 lb 6.4 oz)   LMP  (LMP Unknown)   SpO2 98%   BMI 49.29 kg/m²      Physical Exam  Vitals reviewed.   Constitutional:       General: She is not in acute distress.  Cardiovascular:      Rate and Rhythm: Normal rate.   Pulmonary:      Effort: Pulmonary effort is normal.   Skin:     General: Skin is warm and dry.   Neurological:      Mental Status: She is alert and oriented to person, place, and time.   Psychiatric:         Mood and Affect: Mood normal.         Behavior: Behavior normal.         Thought Content: Thought content normal.         Judgment: Judgment normal.

## 2025-03-03 NOTE — PATIENT INSTRUCTIONS
Will increase Effexor to 75mg daily  Stress management. Activities to divert attention when possible.   Conscious breathing techniques as discussed.   Coping mechanisms and strategies vary from person to person so try to utilize strategies that you think may work for you (such as meditation, music, etc. ).  Consider counseling as discussed.   Anti anxiety/depression medications as prescribed.

## 2025-03-11 NOTE — TELEPHONE ENCOUNTER
As a follow-up, a second attempt has been made for outreach via fax to facility  Please see Contacts section for details      Thank you  Qian Bender MA No

## 2025-03-21 ENCOUNTER — OFFICE VISIT (OUTPATIENT)
Dept: ENDOCRINOLOGY | Facility: CLINIC | Age: 45
End: 2025-03-21
Payer: COMMERCIAL

## 2025-03-21 VITALS
HEIGHT: 60 IN | OXYGEN SATURATION: 98 % | BODY MASS INDEX: 49.59 KG/M2 | WEIGHT: 252.6 LBS | DIASTOLIC BLOOD PRESSURE: 85 MMHG | HEART RATE: 68 BPM | SYSTOLIC BLOOD PRESSURE: 125 MMHG

## 2025-03-21 DIAGNOSIS — E28.2 POLYCYSTIC OVARIES: ICD-10-CM

## 2025-03-21 DIAGNOSIS — E66.01 CLASS 3 SEVERE OBESITY WITH SERIOUS COMORBIDITY AND BODY MASS INDEX (BMI) OF 45.0 TO 49.9 IN ADULT, UNSPECIFIED OBESITY TYPE (HCC): ICD-10-CM

## 2025-03-21 DIAGNOSIS — I10 ESSENTIAL HYPERTENSION: ICD-10-CM

## 2025-03-21 DIAGNOSIS — E66.813 CLASS 3 SEVERE OBESITY WITH SERIOUS COMORBIDITY AND BODY MASS INDEX (BMI) OF 45.0 TO 49.9 IN ADULT, UNSPECIFIED OBESITY TYPE (HCC): ICD-10-CM

## 2025-03-21 DIAGNOSIS — E11.9 TYPE 2 DIABETES MELLITUS WITHOUT COMPLICATION, WITHOUT LONG-TERM CURRENT USE OF INSULIN (HCC): Primary | ICD-10-CM

## 2025-03-21 DIAGNOSIS — E06.3 HYPOTHYROIDISM DUE TO HASHIMOTO'S THYROIDITIS: ICD-10-CM

## 2025-03-21 DIAGNOSIS — E78.2 MIXED HYPERLIPIDEMIA: ICD-10-CM

## 2025-03-21 LAB — SL AMB POCT HEMOGLOBIN AIC: 6.9 (ref ?–6.5)

## 2025-03-21 PROCEDURE — 99204 OFFICE O/P NEW MOD 45 MIN: CPT | Performed by: INTERNAL MEDICINE

## 2025-03-21 PROCEDURE — 83036 HEMOGLOBIN GLYCOSYLATED A1C: CPT | Performed by: INTERNAL MEDICINE

## 2025-03-21 RX ORDER — METFORMIN HYDROCHLORIDE 500 MG/1
500 TABLET, EXTENDED RELEASE ORAL 2 TIMES DAILY WITH MEALS
Qty: 60 TABLET | Refills: 2 | Status: SHIPPED | OUTPATIENT
Start: 2025-03-21

## 2025-03-21 RX ORDER — LEVOTHYROXINE SODIUM 150 UG/1
150 TABLET ORAL
Qty: 90 TABLET | Refills: 2 | Status: SHIPPED | OUTPATIENT
Start: 2025-03-21

## 2025-03-21 NOTE — PROGRESS NOTES
Kassidy Curran 44 y.o. female MRN: [id number]    Encounter: [id number]  Referring Provider  Cathi Paredes  [address] [place] [address]  [address]  [place] [phone]    Assessment & Plan     Type 2 diabetes mellitus not on long-term therapy  Obesity, BMI 49  ADOLPH, using CPAP  Well-controlled with point-of-care A1c 6.9% which is improved    Recommend the following at this time  Continue Trulicity at current dose  Trial of metformin XR, will start at 500 mg orally daily and titrate up as tolerated  Lifestyle modifications as discussed, try to increase protein intake in the diet  Stagger check BG before meals and at bedtime   Labs as ordered   Follow-up in 3 months    Make consider an alternative GLP-1 agonist/tirzepatide in the future if covered by insurance. States that ozempic was not covered 1 year ago     - Recommended a consistent carbohydrate diet   - weight control and exercise as discussed ( ideal is 30 min, at least 5 times a week)   - home glucose monitoring and goals emphasized, requested patient bring in glucose monitor or log sheets to next visit   - discussed signs and symptoms of hypoglycemia and how to correct them appropriately  - counseled about the long term complications of uncontrolled diabetes, including, Nephropathy, Neuropathy, CVD, Retinopathy and importance  of adherence to diet, treatment plan and life style modifications   - importance of following up with Opthalmology and podiatry   - glycohemoglobin and other lab monitoring discussed, A1c goal value reviewed  - long term diabetic complications discussed    4. Hyperlipidemia  - continue statin therapy, fish oil;  check lipid panel    5. Hypertension  Blood pressure at goal   - continue ACE-I/ ARB    6.  Hypothyroidism-clinically euthyroid.  Continue current dose of levothyroxine, check TSH, free T4      Follow up in 3 months     CC: Diabetes      History of Present Illness  Kassidy Curran  is a 44-year-old female who presents for a new  consult regarding management of type 2 diabetes mellitus and hypothyroidism.  Last medical history of hypertension, hyperlipidemia, PCOS, obesity, ADOLPH, vitamin D deficiency.     Recently, has been following up with her primary care for management of diabetes mellitus  History of gestational diabetes during both pregnancies in 2012 and 2014. The first instance of gestational diabetes resolved postpartum, but the second persisted despite weight loss throughout the pregnancy.     Since then, her A1c levels have fluctuated, occasionally exceeding 7.   Currently on Trulicity 3 mg weekly; has been on this dose for approximately 6 months    Jardiance resulted in urinary tract infections and yeast infections  Used metformin in the past - Discontinued due to persistent diarrhea.   No known complications of heart attack, stroke, or kidney disease.  She is up-to-date with her eye exams, with the most recent one conducted in August 2024, which revealed no retinopathy.     She experiences high blood sugar levels in the early morning, regardless of her previous day's diet. She does not regularly monitor her blood sugar levels, checking them approximately once a week, typically in the morning, with readings around 160.     No recent changes in appetite or weight, although she had a stomach bug last week that temporarily affected her appetite. She reports no numbness or tingling in her hands or feet, vision changes, chest pain, or shortness of breath. She has not experienced any weight loss on Trulicity. She consulted a nutritionist during her pregnancy and prefers to manage her condition independently at present. She is willing to try metformin again at a lower dose.    She was diagnosed with hypothyroidism several years ago and is currently on levothyroxine 150 mcg once daily, which she takes at night between 9 and 10 PM, with her last meal typically around 6 PM. She reports no snacking after dinner. She reports no shakes or  tremors but always feels hot.  Not have any other symptoms of hypo or hyper thyroidism    History of PCOS, diagnosed when she was trying to conceive around  or . She was on birth control pills from  until , so she is unsure if her menstrual cycles were irregular.   Currently periods are more regular.  No acne; Has chin hair which she shaves; no easy bruising.    She has severe sleep apnea and uses a CPAP machine daily since 2018 u    Hyperlipidemia: atorvastatin 20 mg and fish oil.  Hypertension: Telmisratan        All other systems were reviewed and are negative.    Review of Systems      Historical Information   Past Medical History:   Diagnosis Date    Abnormal blood chemistry     Allergic     Allergic rhinitis     Antepartum diabetes mellitus     Anxiety     Bilateral chronic serous otitis media 2020    Cough     Depression     Diabetes mellitus (HCC)     Disease of thyroid gland     HYPOTHYROID    Dizziness     Elevated blood pressure reading     Fatigue     Gastritis     Generalized anxiety disorder     History of asthma     History of diabetes mellitus     History of dysfunctional uterine bleeding     History of ear pain     left    History of edema     History of gestational diabetes     History of hyperlipidemia     History of hypothyroidism     History of polycystic ovarian syndrome     History of sore throat     History of upper respiratory infection     Hypertension     Hypothyroidism     Migraines     Morbidly obese (HCC)     Nasal congestion     Nonspecific abnormal finding     Obesity     Polycystic ovary     Polycystic ovary syndrome     Postsurgical dumping syndrome     Seasonal allergies     Shingles 2012    Right after my     Sleep apnea     on CPAP    Sleep difficulties     Suspected fetal anomaly not found     Tinnitus pulsing only on left     Past Surgical History:   Procedure Laterality Date    ADENOIDECTOMY      CARDIAC ELECTROPHYSIOLOGY STUDY AND ABLATION        SECTION      CHOLECYSTECTOMY      FOOT SURGERY Right     screw rt toe    GALLBLADDER SURGERY      MOUTH SURGERY      MI ESOPHAGOGASTRODUODENOSCOPY TRANSORAL DIAGNOSTIC N/A 2016    Procedure: ESOPHAGOGASTRODUODENOSCOPY (EGD);  Surgeon: Becki Patrick MD;  Location: [facility];  Service: General    TONSILLECTOMY      TOOTH EXTRACTION      WISDOM TOOTH EXTRACTION       Social History   Social History     Substance and Sexual Activity   Alcohol Use Yes    Comment: social     Social History     Substance and Sexual Activity   Drug Use No     Social History     Tobacco Use   Smoking Status Never    Passive exposure: Past   Smokeless Tobacco Never     Family History:   Family History   Problem Relation Age of Onset    Heart disease Mother     Hypertension Mother     Valvular heart disease Mother     Breast cancer Mother 65    Diabetes Mother     Thyroid disease Mother     Scoliosis Mother     No Known Problems Daughter     Breast cancer Maternal Grandmother 75        triple neg    Diabetes Maternal Grandmother     Hypertension Maternal Grandmother     Stroke Maternal Grandmother     Diabetes Maternal Grandfather     No Known Problems Paternal Grandmother     No Known Problems Paternal Grandfather     No Known Problems Son     No Known Problems Maternal Aunt     No Known Problems Maternal Aunt     No Known Problems Paternal Aunt     No Known Problems Paternal Aunt     Arthritis Family     Varicose Veins Family         of lower extremities       Meds/Allergies   Current Outpatient Medications   Medication Sig Dispense Refill    atorvastatin (LIPITOR) 20 mg tablet Take 1 tablet (20 mg total) by mouth daily 90 tablet 2    Blood Glucose Monitoring Suppl (ONE TOUCH ULTRA MINI) w/Device KIT USE AS DIRECTED DX: E11.8  0    dulaglutide (Trulicity) 3 MG/0.5ML injection Inject 0.5 mL (3 mg total) under the skin every 7 days 6 mL 1    fluticasone (FLONASE) 50 mcg/act nasal spray SPRAY 1 SPRAY INTO EACH NOSTRIL EVERY  DAY 48 mL 1    glucose blood (Accu-Chek Samaria Plus) test strip Test 4 times per day 100 each 1    Lancets (ONETOUCH ULTRASOFT) lancets Use as instructed 100 each 0    levothyroxine 150 mcg tablet TAKE 1 TABLET (150 MCG TOTAL) BY MOUTH DAILY IN THE EARLY MORNING 90 tablet 2    meloxicam (MOBIC) 15 mg tablet TAKE 1 TABLET (15 MG TOTAL) BY MOUTH DAILY. 90 tablet 1    Multiple Vitamins-Minerals (MULTIVITAMIN ADULT) CHEW Chew      Omega-3 Fatty Acids (fish oil) 1,000 mg Take 1 capsule (1,000 mg total) by mouth 2 (two) times a day 180 capsule 4    telmisartan (MICARDIS) 40 mg tablet TAKE 1 TABLET BY MOUTH EVERY  tablet 1    Trulicity 3 MG/0.5ML SOAJ 3 mL      venlafaxine (EFFEXOR-XR) 75 mg 24 hr capsule Take 1 capsule (75 mg total) by mouth daily 90 capsule 1    Vitamin D, Cholecalciferol, 1000 UNITS CAPS Take by mouth.      albuterol (PROVENTIL HFA,VENTOLIN HFA) 90 mcg/act inhaler INHALE 2 PUFFS EVERY 6 HOURS AS NEEDED FOR WHEEZING (Patient not taking: Reported on 3/21/2025) 6.7 g 5    benzonatate (TESSALON) 200 MG capsule Take 1 capsule (200 mg total) by mouth 3 (three) times a day as needed for cough (Patient not taking: Reported on 3/21/2025) 20 capsule 0    omega-3-acid ethyl esters (LOVAZA) 1 g capsule Take 1 g by mouth daily (Patient not taking: Reported on 3/3/2025)       No current facility-administered medications for this visit.     Allergies   Allergen Reactions    Jardiance [Empagliflozin] Rash    Cat Dander     Dog Epithelium     Metformin Diarrhea    Other      ADHESIVE/WELTS    Pollen Extract        Objective   Vitals: Blood pressure 125/85, pulse 68, height 5' (1.524 m), weight 115 kg (252 lb 9.6 oz), SpO2 98%.    Physical Exam  Constitutional:       Appearance: She is well-developed.   HENT:      Head: Normocephalic and atraumatic.   Eyes:      Conjunctiva/sclera: Conjunctivae normal.   Neck:      Thyroid: No thyromegaly.   Cardiovascular:      Rate and Rhythm: Normal rate and regular rhythm.       Heart sounds: Normal heart sounds. No murmur heard.  Pulmonary:      Effort: Pulmonary effort is normal.      Breath sounds: Normal breath sounds. No wheezing.   Abdominal:      General: There is no distension.      Palpations: Abdomen is soft.      Tenderness: There is no abdominal tenderness.   Musculoskeletal:         General: Normal range of motion.      Cervical back: Normal range of motion and neck supple.   Skin:     General: Skin is warm and dry.   Neurological:      Mental Status: She is alert and oriented to person, place, and time.   Psychiatric:         Behavior: Behavior normal.       The history was obtained from the review of the chart, patient and family.    Lab Results:   Lab Results   Component Value Date/Time    Hemoglobin A1C 7.6 (H) 12/03/2024 08:44 AM    Hemoglobin A1C 7.6 (H) 08/31/2024 08:33 AM    White Blood Cell Count 9.1 08/31/2024 08:33 AM    Hemoglobin 11.5 (L) 08/31/2024 08:33 AM    HCT 36.1 08/31/2024 08:33 AM    MCV 87.0 08/31/2024 08:33 AM    Platelet Count 273 08/31/2024 08:33 AM    BUN 12 12/03/2024 08:44 AM    BUN 12 08/31/2024 08:33 AM    Potassium 4.2 12/03/2024 08:44 AM    Potassium 4.3 08/31/2024 08:33 AM    Chloride 102 12/03/2024 08:44 AM    Chloride 101 08/31/2024 08:33 AM    CO2 31 12/03/2024 08:44 AM    CO2 27 08/31/2024 08:33 AM    Creatinine 0.74 12/03/2024 08:44 AM    Creatinine 0.72 08/31/2024 08:33 AM    AST 19 12/03/2024 08:44 AM    AST 15 08/31/2024 08:33 AM    ALT 18 12/03/2024 08:44 AM    ALT 15 08/31/2024 08:33 AM    Protein, Total 7.0 12/03/2024 08:44 AM    Protein, Total 6.5 08/31/2024 08:33 AM    Albumin 4.3 12/03/2024 08:44 AM    Albumin 3.9 08/31/2024 08:33 AM    Globulin 2.7 12/03/2024 08:44 AM    Globulin 2.6 08/31/2024 08:33 AM    HDL 27 (L) 08/31/2024 08:33 AM    Triglycerides 174 (H) 08/31/2024 08:33 AM           Imaging Studies: Results Review Statement: No pertinent imaging studies reviewed.    Portions of the record may have been created with voice  "recognition software. Occasional wrong word or \"sound a like\" substitutions may have occurred due to the inherent limitations of voice recognition software. Read the chart carefully and recognize, using context, where substitutions have occurred.    "

## 2025-03-21 NOTE — PATIENT INSTRUCTIONS
Please have labs done as ordered  Continue Trulicity, levothyroxine at current dose  Trial of metformin  mg orally daily, if you do not have any gastric side effects, please increase to 500 mg orally twice a day after 3 to 4 days.  Every few days, it may be titrated up gradually to a maximum dose of the 1000 mg orally twice a day  Lifestyle modifications as discussed, please try to increase protein intake in your diet  Follow-up in 3 months  Stagger check BG before meals and at bedtime

## 2025-03-31 ENCOUNTER — OFFICE VISIT (OUTPATIENT)
Dept: FAMILY MEDICINE CLINIC | Facility: CLINIC | Age: 45
End: 2025-03-31
Payer: COMMERCIAL

## 2025-03-31 VITALS
WEIGHT: 252.2 LBS | HEART RATE: 80 BPM | RESPIRATION RATE: 18 BRPM | DIASTOLIC BLOOD PRESSURE: 80 MMHG | TEMPERATURE: 96 F | OXYGEN SATURATION: 97 % | BODY MASS INDEX: 49.52 KG/M2 | SYSTOLIC BLOOD PRESSURE: 128 MMHG | HEIGHT: 60 IN

## 2025-03-31 DIAGNOSIS — F32.2 SEVERE MAJOR DEPRESSIVE DISORDER (HCC): Primary | ICD-10-CM

## 2025-03-31 DIAGNOSIS — F41.9 ANXIETY: ICD-10-CM

## 2025-03-31 PROCEDURE — 99213 OFFICE O/P EST LOW 20 MIN: CPT | Performed by: NURSE PRACTITIONER

## 2025-03-31 NOTE — ASSESSMENT & PLAN NOTE
Depression score 1. Doing much better on increased medication dose.   Continue effexor 75mg daily  Anticipatory guidance.

## 2025-03-31 NOTE — ASSESSMENT & PLAN NOTE
Continue effexor 75mg daily.   Stress management. Activities to divert attention when possible.   Conscious breathing techniques as discussed.   Coping mechanisms and strategies vary from person to person so try to utilize strategies that you think may work for you (such as meditation, music, etc. ).  Anti anxiety/depression medications as prescribed.

## 2025-04-07 ENCOUNTER — RESULTS FOLLOW-UP (OUTPATIENT)
Dept: ENDOCRINOLOGY | Facility: CLINIC | Age: 45
End: 2025-04-07

## 2025-04-10 ENCOUNTER — TELEPHONE (OUTPATIENT)
Dept: CARDIOLOGY CLINIC | Facility: CLINIC | Age: 45
End: 2025-04-10

## 2025-04-10 ENCOUNTER — TELEPHONE (OUTPATIENT)
Dept: OTHER | Facility: OTHER | Age: 45
End: 2025-04-10

## 2025-04-10 NOTE — TELEPHONE ENCOUNTER
Patient is calling regarding cancelling an appointment.    Date/Time: 4/10/2024, 8 AM    Patient was rescheduled: YES [] NO [x]    Patient requesting call back to reschedule: YES [x] NO []    Offered to reschedule appointment, but patient is requesting a return call from the office to reschedule.

## 2025-04-18 DIAGNOSIS — E06.3 HYPOTHYROIDISM DUE TO HASHIMOTO'S THYROIDITIS: ICD-10-CM

## 2025-04-18 DIAGNOSIS — E66.813 CLASS 3 SEVERE OBESITY WITH SERIOUS COMORBIDITY AND BODY MASS INDEX (BMI) OF 45.0 TO 49.9 IN ADULT, UNSPECIFIED OBESITY TYPE: ICD-10-CM

## 2025-04-18 DIAGNOSIS — E28.2 POLYCYSTIC OVARIES: ICD-10-CM

## 2025-04-18 DIAGNOSIS — E11.9 TYPE 2 DIABETES MELLITUS WITHOUT COMPLICATION, WITHOUT LONG-TERM CURRENT USE OF INSULIN (HCC): ICD-10-CM

## 2025-04-18 DIAGNOSIS — E78.2 MIXED HYPERLIPIDEMIA: ICD-10-CM

## 2025-04-18 DIAGNOSIS — I10 ESSENTIAL HYPERTENSION: ICD-10-CM

## 2025-04-18 RX ORDER — METFORMIN HYDROCHLORIDE 500 MG/1
500 TABLET, EXTENDED RELEASE ORAL 2 TIMES DAILY WITH MEALS
Qty: 180 TABLET | Refills: 1 | Status: SHIPPED | OUTPATIENT
Start: 2025-04-18

## 2025-06-06 ENCOUNTER — TELEPHONE (OUTPATIENT)
Dept: FAMILY MEDICINE CLINIC | Facility: CLINIC | Age: 45
End: 2025-06-06

## 2025-06-06 NOTE — TELEPHONE ENCOUNTER
----- Message from SULMA Paredes sent at 6/6/2025  8:41 AM EDT -----  Regarding: labs  Pt has upcoming appt for physical and DM fu on Monday 6/9. Labs have not been done. Needs to have labs done prior to appt. Orders in chart. Please call pt to advise.

## 2025-06-17 LAB
25(OH)D3 SERPL-MCNC: 35 NG/ML (ref 30–100)
ALBUMIN SERPL-MCNC: 4.2 G/DL (ref 3.6–5.1)
ALBUMIN/GLOB SERPL: 1.6 (CALC) (ref 1–2.5)
ALP SERPL-CCNC: 110 U/L (ref 31–125)
ALT SERPL-CCNC: 14 U/L (ref 6–29)
AST SERPL-CCNC: 13 U/L (ref 10–35)
BILIRUB SERPL-MCNC: 0.6 MG/DL (ref 0.2–1.2)
BUN SERPL-MCNC: 12 MG/DL (ref 7–25)
BUN/CREAT SERPL: ABNORMAL (CALC) (ref 6–22)
CALCIUM SERPL-MCNC: 9.1 MG/DL (ref 8.6–10.2)
CHLORIDE SERPL-SCNC: 101 MMOL/L (ref 98–110)
CHOLEST SERPL-MCNC: 169 MG/DL
CHOLEST/HDLC SERPL: 5.8 (CALC)
CO2 SERPL-SCNC: 30 MMOL/L (ref 20–32)
CREAT SERPL-MCNC: 0.72 MG/DL (ref 0.5–0.99)
ERYTHROCYTE [DISTWIDTH] IN BLOOD BY AUTOMATED COUNT: 13.7 % (ref 11–15)
GFR/BSA.PRED SERPLBLD CYS-BASED-ARV: 104 ML/MIN/1.73M2
GLOBULIN SER CALC-MCNC: 2.7 G/DL (CALC) (ref 1.9–3.7)
GLUCOSE SERPL-MCNC: 148 MG/DL (ref 65–99)
HBA1C MFR BLD: 7 %
HCT VFR BLD AUTO: 38.4 % (ref 35–45)
HDLC SERPL-MCNC: 29 MG/DL
HGB BLD-MCNC: 12.5 G/DL (ref 11.7–15.5)
LDLC SERPL CALC-MCNC: 108 MG/DL (CALC)
MCH RBC QN AUTO: 28.2 PG (ref 27–33)
MCHC RBC AUTO-ENTMCNC: 32.6 G/DL (ref 32–36)
MCV RBC AUTO: 86.7 FL (ref 80–100)
NONHDLC SERPL-MCNC: 140 MG/DL (CALC)
PLATELET # BLD AUTO: 260 THOUSAND/UL (ref 140–400)
PMV BLD REES-ECKER: 10.2 FL (ref 7.5–12.5)
POTASSIUM SERPL-SCNC: 4.2 MMOL/L (ref 3.5–5.3)
PROT SERPL-MCNC: 6.9 G/DL (ref 6.1–8.1)
RBC # BLD AUTO: 4.43 MILLION/UL (ref 3.8–5.1)
SODIUM SERPL-SCNC: 138 MMOL/L (ref 135–146)
TRIGL SERPL-MCNC: 199 MG/DL
TSH SERPL-ACNC: 1.05 MIU/L
WBC # BLD AUTO: 9.7 THOUSAND/UL (ref 3.8–10.8)

## 2025-06-26 ENCOUNTER — OFFICE VISIT (OUTPATIENT)
Dept: FAMILY MEDICINE CLINIC | Facility: CLINIC | Age: 45
End: 2025-06-26
Payer: COMMERCIAL

## 2025-06-26 VITALS
BODY MASS INDEX: 48.73 KG/M2 | SYSTOLIC BLOOD PRESSURE: 122 MMHG | HEART RATE: 78 BPM | RESPIRATION RATE: 16 BRPM | TEMPERATURE: 97.6 F | HEIGHT: 60 IN | OXYGEN SATURATION: 98 % | DIASTOLIC BLOOD PRESSURE: 64 MMHG | WEIGHT: 248.2 LBS

## 2025-06-26 DIAGNOSIS — E66.01 MORBID OBESITY (HCC): ICD-10-CM

## 2025-06-26 DIAGNOSIS — I10 ESSENTIAL HYPERTENSION: ICD-10-CM

## 2025-06-26 DIAGNOSIS — Z12.31 ENCOUNTER FOR SCREENING MAMMOGRAM FOR BREAST CANCER: ICD-10-CM

## 2025-06-26 DIAGNOSIS — E06.3 HYPOTHYROIDISM DUE TO HASHIMOTO'S THYROIDITIS: ICD-10-CM

## 2025-06-26 DIAGNOSIS — Z00.00 ANNUAL PHYSICAL EXAM: ICD-10-CM

## 2025-06-26 DIAGNOSIS — E11.9 TYPE 2 DIABETES MELLITUS WITHOUT COMPLICATION, WITHOUT LONG-TERM CURRENT USE OF INSULIN (HCC): Primary | ICD-10-CM

## 2025-06-26 DIAGNOSIS — F32.2 SEVERE MAJOR DEPRESSIVE DISORDER (HCC): ICD-10-CM

## 2025-06-26 PROCEDURE — 99214 OFFICE O/P EST MOD 30 MIN: CPT | Performed by: NURSE PRACTITIONER

## 2025-06-26 PROCEDURE — 99396 PREV VISIT EST AGE 40-64: CPT | Performed by: NURSE PRACTITIONER

## 2025-06-26 NOTE — ASSESSMENT & PLAN NOTE
Doing well on Effexor 75mg   Anticipatory guidance.   Depression score currently 0  Continue. Monitor.

## 2025-06-26 NOTE — ASSESSMENT & PLAN NOTE
Controlled. Managed by endocrine. Low carb diet. Regular exercise.   Lab Results   Component Value Date    HGBA1C 7.0 (H) 06/16/2025

## 2025-06-26 NOTE — PATIENT INSTRUCTIONS
"Patient Education     Routine physical for adults   The Basics   Written by the doctors and editors at [facility]   What is a physical? -- A physical is a routine visit, or \"check-up,\" with your doctor. You might also hear it called a \"wellness visit\" or \"preventive visit.\"  During each visit, the doctor will:   Ask about your physical and mental health   Ask about your habits, behaviors, and lifestyle   Do an exam   Give you vaccines if needed   Talk to you about any medicines you take   Give advice about your health   Answer your questions  Getting regular check-ups is an important part of taking care of your health. It can help your doctor find and treat any problems you have. But it's also important for preventing health problems.  A routine physical is different from a \"sick visit.\" A sick visit is when you see a doctor because of a health concern or problem. Since physicals are scheduled ahead of time, you can think about what you want to ask the doctor.  How often should I get a physical? -- It depends on your age and health. In general, for people age 21 years and older:   If you are younger than 50 years, you might be able to get a physical every 3 years.   If you are 50 years or older, your doctor might recommend a physical every year.  If you have an ongoing health condition, like diabetes or high blood pressure, your doctor will probably want to see you more often.  What happens during a physical? -- In general, each visit will include:   Physical exam - The doctor or nurse will check your height, weight, heart rate, and blood pressure. They will also look at your eyes and ears. They will ask about how you are feeling and whether you have any symptoms that bother you.   Medicines - It's a good idea to bring a list of all the medicines you take to each doctor visit. Your doctor will talk to you about your medicines and answer any questions. Tell them if you are having any side effects that bother you. You " "should also tell them if you are having trouble paying for any of your medicines.   Habits and behaviors - This includes:   Your diet   Your exercise habits   Whether you smoke, drink alcohol, or use drugs   Whether you are sexually active   Whether you feel safe at home  Your doctor will talk to you about things you can do to improve your health and lower your risk of health problems. They will also offer help and support. For example, if you want to quit smoking, they can give you advice and might prescribe medicines. If you want to improve your diet or get more physical activity, they can help you with this, too.   Lab tests, if needed - The tests you get will depend on your age and situation. For example, your doctor might want to check your:   Cholesterol   Blood sugar   Iron level   Vaccines - The recommended vaccines will depend on your age, health, and what vaccines you already had. Vaccines are very important because they can prevent certain serious or deadly infections.   Discussion of screening - \"Screening\" means checking for diseases or other health problems before they cause symptoms. Your doctor can recommend screening based on your age, risk, and preferences. This might include tests to check for:   Cancer, such as breast, prostate, cervical, ovarian, colorectal, prostate, lung, or skin cancer   Sexually transmitted infections, such as chlamydia and gonorrhea   Mental health conditions like depression and anxiety  Your doctor will talk to you about the different types of screening tests. They can help you decide which screenings to have. They can also explain what the results might mean.   Answering questions - The physical is a good time to ask the doctor or nurse questions about your health. If needed, they can refer you to other doctors or specialists, too.  Adults older than 65 years often need other care, too. As you get older, your doctor will talk to you about:   How to prevent falling at " home   Hearing or vision tests   Memory testing   How to take your medicines safely   Making sure that you have the help and support you need at home  All topics are updated as new evidence becomes available and our peer review process is complete.  This topic retrieved from [employer] on: May 02, 2024.  Topic 392045 Version 1.0  Release: 32.4.3 - C32.122  © 2024 UpToDate, Inc. and/or its affiliates. All rights reserved.  Consumer Information Use and Disclaimer   Disclaimer: This generalized information is a limited summary of diagnosis, treatment, and/or medication information. It is not meant to be comprehensive and should be used as a tool to help the user understand and/or assess potential diagnostic and treatment options. It does NOT include all information about conditions, treatments, medications, side effects, or risks that may apply to a specific patient. It is not intended to be medical advice or a substitute for the medical advice, diagnosis, or treatment of a health care provider based on the health care provider's examination and assessment of a patient's specific and unique circumstances. Patients must speak with a health care provider for complete information about their health, medical questions, and treatment options, including any risks or benefits regarding use of medications. This information does not endorse any treatments or medications as safe, effective, or approved for treating a specific patient. UpToDate, Inc. and its affiliates disclaim any warranty or liability relating to this information or the use thereof.The use of this information is governed by the Terms of Use, available at https://www.woltersDocalyticsuwer.com/en/know/clinical-effectiveness-terms. 2024© UpToDate, Inc. and its affiliates and/or licensors. All rights reserved.  Copyright   © 2024 UpToDate, Inc. and/or its affiliates. All rights reserved.

## 2025-06-26 NOTE — ASSESSMENT & PLAN NOTE
Stable. Continue blood pressure medications as ordered.   Monitor blood pressure. Stress management. Regular exercise  Limit salt in diet.

## 2025-06-26 NOTE — PROGRESS NOTES
Adult Annual Physical  Name: Kassidy Curran      : 1980      MRN: [id number]  Encounter Provider: SULMA Paredes  Encounter Date: 2025   Encounter department: [facility]    :  Assessment & Plan  Type 2 diabetes mellitus without complication, without long-term current use of insulin (HCC)  Controlled. Managed by endocrine. Low carb diet. Regular exercise.   Lab Results   Component Value Date    HGBA1C 7.0 (H) 2025          Hypothyroidism due to Hashimoto's thyroiditis  TSH on current dose of levothyroxine. Monitor.        Severe major depressive disorder (HCC)  Doing well on Effexor 75mg   Anticipatory guidance.   Depression score currently 0  Continue. Monitor.            Morbid obesity (HCC)  Weight loss is recommended to improve overall health.   Dietary changes- limit carbohydrates, decrease overall caloric intake, reduce portion sizes, healthier snack choices, limit saturated fats, increase intake of fruits and vegetables, limit junk food.   exercise to 3-5 times per week. Consider adding strength exercises to exercise routine.            Essential hypertension  Stable. Continue blood pressure medications as ordered.   Monitor blood pressure. Stress management. Regular exercise  Limit salt in diet.          Annual physical exam  Heart healthy, carbohydrate controlled diet- limit red meat, limit saturated fat, moderate salt intake, limit junk food, etc.   Regular exercise  Stress management  Routine labwork and screenings as ordered.          Encounter for screening mammogram for breast cancer  Orders:    Mammo screening bilateral w 3d and cad; Future        Preventive Screenings:  - Diabetes Screening: has diabetes, risks/benefits discussed and screening up-to-date  - Cholesterol Screening: has hyperlipidemia, risks/benefits discussed and screening up-to-date   - Hepatitis C screening: screening up-to-date   - HIV screening: screening up-to-date   - Cervical  "cancer screening: screening up-to-date   - Breast cancer screening: screening up-to-date   - Colon cancer screening: screening up-to-date   - Lung cancer screening: screening not indicated     Immunizations:  - Immunizations due: Prevnar 20 and Tdap      Depression Screening and Follow-up Plan: Patient was screened for depression during today's encounter. They screened negative with a PHQ-9 score of 0.          History of Present Illness     Adult Annual Physical:  Patient presents for annual physical. Here for follow up on multiple chronic condition, med check, lab review, and annual exam  Following with endocrine for DM. Restarted on metformin by endocrine. Only took for about one week and did not tolerate. \" A lot of diarrhea\" so stopped. Will be discussing with endocrine.   Following with pulmonary for sleep apnea, uses cpap nightly. .     Diet and Physical Activity:  - Diet/Nutrition: no special diet.  - Exercise: 5-7 times a week on average. swiming    Depression Screening:    - PHQ-9 Score: 0    General Health:  - Sleep: uses CPAP machine and > 8 hours of sleep on average.  - Hearing: normal hearing bilateral ears and tinnitus.  - Vision: wears glasses and most recent eye exam > 1 year ago.  - Dental: regular dental visits, brushes teeth three times daily and does not floss.    /GYN Health:  - Follows with GYN: no.   - History of STDs: no    Advanced Care Planning:  - Has an advanced directive?: no    - Has a durable medical POA?: no    - ACP document given to patient?: yes      BMI Counseling: Body mass index is 48.07 kg/m². The BMI is above normal. Nutrition recommendations include reducing portion sizes, decreasing overall calorie intake, moderation in carbohydrate intake, reducing intake of saturated fat and trans fat, and reducing intake of cholesterol. Exercise recommendations include exercising 3-5 times per week.    Review of Systems   Constitutional:  Negative for chills, diaphoresis, fatigue and " "fever.   HENT:  Negative for congestion, ear pain, sinus pressure, sinus pain and sore throat.    Eyes:  Negative for visual disturbance.   Respiratory:  Negative for cough, chest tightness, shortness of breath and wheezing.    Cardiovascular:  Negative for chest pain, palpitations and leg swelling.   Gastrointestinal:  Negative for abdominal distention, abdominal pain, diarrhea and nausea.   Endocrine: Negative for polydipsia and polyuria.   Genitourinary:  Negative for dysuria, frequency and urgency.   Musculoskeletal:  Negative for arthralgias, back pain and myalgias.   Skin:  Negative for color change and pallor.   Neurological:  Negative for dizziness, weakness and headaches.   Hematological:  Negative for adenopathy.   Psychiatric/Behavioral:  Negative for dysphoric mood. The patient is not nervous/anxious.    All other systems reviewed and are negative.        Objective   Resp 16   Ht 5' 0.25\" (1.53 m)   Wt 113 kg (248 lb 3.2 oz)   BMI 48.07 kg/m²     Recent Results (from the past 4 weeks)   Lipid Panel with Direct LDL reflex    Collection Time: 06/16/25  8:39 AM   Result Value Ref Range    Total Cholesterol 169 <200 mg/dL    HDL 29 (L) > OR = 50 mg/dL    Triglycerides 199 (H) <150 mg/dL    LDL Calculated 108 (H) mg/dL (calc)    Chol HDLC Ratio 5.8 (H) <5.0 (calc)    Non-HDL Cholesterol 140 (H) <130 mg/dL (calc)   Comprehensive metabolic panel    Collection Time: 06/16/25  8:39 AM   Result Value Ref Range    Glucose, Random 148 (H) 65 - 99 mg/dL    BUN 12 7 - 25 mg/dL    Creatinine 0.72 0.50 - 0.99 mg/dL    eGFR 104 > OR = 60 mL/min/1.73m2    SL AMB BUN/CREATININE RATIO SEE NOTE: 6 - 22 (calc)    Sodium 138 135 - 146 mmol/L    Potassium 4.2 3.5 - 5.3 mmol/L    Chloride 101 98 - 110 mmol/L    CO2 30 20 - 32 mmol/L    Calcium 9.1 8.6 - 10.2 mg/dL    Protein, Total 6.9 6.1 - 8.1 g/dL    Albumin 4.2 3.6 - 5.1 g/dL    Globulin 2.7 1.9 - 3.7 g/dL (calc)    Albumin/Globulin Ratio 1.6 1.0 - 2.5 (calc)    TOTAL " BILIRUBIN 0.6 0.2 - 1.2 mg/dL    Alkaline Phosphatase 110 31 - 125 U/L    AST 13 10 - 35 U/L    ALT 14 6 - 29 U/L   CBC    Collection Time: 06/16/25  8:39 AM   Result Value Ref Range    White Blood Cell Count 9.7 3.8 - 10.8 Thousand/uL    Red Blood Cell Count 4.43 3.80 - 5.10 Million/uL    Hemoglobin 12.5 11.7 - 15.5 g/dL    HCT 38.4 35.0 - 45.0 %    MCV 86.7 80.0 - 100.0 fL    MCH 28.2 27.0 - 33.0 pg    MCHC 32.6 32.0 - 36.0 g/dL    RDW 13.7 11.0 - 15.0 %    Platelet Count 260 140 - 400 Thousand/uL    SL AMB MPV 10.2 7.5 - 12.5 fL   TSH, 3rd generation with Free T4 reflex    Collection Time: 06/16/25  8:39 AM   Result Value Ref Range    TSH W/RFX TO FREE T4 1.05 mIU/L   Vitamin D 25 hydroxy    Collection Time: 06/16/25  8:39 AM   Result Value Ref Range    Vitamin D, 25-Hydroxy, Serum 35 30 - 100 ng/mL   Hemoglobin A1c (w/out EAG)    Collection Time: 06/16/25  8:39 AM   Result Value Ref Range    Hemoglobin A1C 7.0 (H) <5.7 %     Reviewed lab/diagnostic results with pt including both normal and abnormal findings.   In depth counseling and instructions given. All questions answered during visit.     Physical Exam  Vitals reviewed.   Constitutional:       General: She is not in acute distress.     Appearance: Normal appearance. She is well-developed. She is obese. She is not ill-appearing.   HENT:      Head: Normocephalic and atraumatic.      Right Ear: Tympanic membrane and ear canal normal.      Left Ear: Tympanic membrane and ear canal normal.      Nose: Nose normal.      Mouth/Throat:      Mouth: Mucous membranes are moist.      Pharynx: Oropharynx is clear.     Eyes:      General: No scleral icterus.     Extraocular Movements: Extraocular movements intact.      Pupils: Pupils are equal, round, and reactive to light.     Neck:      Thyroid: No thyromegaly.      Vascular: No carotid bruit.     Cardiovascular:      Rate and Rhythm: Normal rate and regular rhythm.      Heart sounds: Normal heart sounds. No murmur  heard.  Pulmonary:      Effort: Pulmonary effort is normal. No respiratory distress.      Breath sounds: Normal breath sounds. No wheezing or rales.   Abdominal:      General: Bowel sounds are normal. There is no distension.      Palpations: Abdomen is soft.      Tenderness: There is no abdominal tenderness.     Musculoskeletal:         General: Normal range of motion.      Cervical back: Normal range of motion and neck supple.      Right lower leg: No edema.      Left lower leg: No edema.   Lymphadenopathy:      Cervical: No cervical adenopathy.     Skin:     General: Skin is warm and dry.      Coloration: Skin is not jaundiced or pale.     Neurological:      General: No focal deficit present.      Mental Status: She is alert and oriented to person, place, and time.      Cranial Nerves: No cranial nerve deficit.      Sensory: No sensory deficit.      Coordination: Coordination normal.      Gait: Gait normal.     Psychiatric:         Mood and Affect: Mood normal.         Behavior: Behavior normal.         Thought Content: Thought content normal.         Judgment: Judgment normal.

## 2025-07-20 DIAGNOSIS — I10 ESSENTIAL HYPERTENSION: ICD-10-CM

## 2025-07-21 RX ORDER — TELMISARTAN 40 MG/1
40 TABLET ORAL DAILY
Qty: 90 TABLET | Refills: 1 | Status: SHIPPED | OUTPATIENT
Start: 2025-07-21

## 2025-08-01 ENCOUNTER — HOSPITAL ENCOUNTER (OUTPATIENT)
Age: 45
Discharge: HOME | End: 2025-08-01
Payer: COMMERCIAL

## 2025-08-01 VITALS — BODY MASS INDEX: 48.69 KG/M2 | WEIGHT: 248 LBS | HEIGHT: 60 IN

## 2025-08-01 DIAGNOSIS — Z12.31 ENCOUNTER FOR SCREENING MAMMOGRAM FOR BREAST CANCER: ICD-10-CM

## 2025-08-01 PROCEDURE — 77063 BREAST TOMOSYNTHESIS BI: CPT

## 2025-08-01 PROCEDURE — 77067 SCR MAMMO BI INCL CAD: CPT
